# Patient Record
Sex: FEMALE | Race: WHITE | NOT HISPANIC OR LATINO | Employment: FULL TIME | URBAN - NONMETROPOLITAN AREA
[De-identification: names, ages, dates, MRNs, and addresses within clinical notes are randomized per-mention and may not be internally consistent; named-entity substitution may affect disease eponyms.]

---

## 2019-01-02 ENCOUNTER — HOSPITAL ENCOUNTER (EMERGENCY)
Facility: HOSPITAL | Age: 49
Discharge: HOME | End: 2019-01-02
Attending: EMERGENCY MEDICINE
Payer: COMMERCIAL

## 2019-01-02 VITALS
HEIGHT: 61 IN | DIASTOLIC BLOOD PRESSURE: 88 MMHG | RESPIRATION RATE: 20 BRPM | WEIGHT: 160.94 LBS | OXYGEN SATURATION: 95 % | TEMPERATURE: 102.2 F | SYSTOLIC BLOOD PRESSURE: 157 MMHG | HEART RATE: 115 BPM | BODY MASS INDEX: 30.39 KG/M2

## 2019-01-02 DIAGNOSIS — B34.9 VIRAL ILLNESS: Primary | ICD-10-CM

## 2019-01-02 LAB
FLUAV AG SPEC QL: DETECTED
FLUBV AG SPEC QL: ABNORMAL
RSV B RNA SPEC QL NAA+PROBE: ABNORMAL

## 2019-01-02 PROCEDURE — 87631 RESP VIRUS 3-5 TARGETS: CPT | Performed by: PHYSICIAN ASSISTANT

## 2019-01-02 PROCEDURE — 99283 EMERGENCY DEPT VISIT LOW MDM: CPT

## 2019-01-02 RX ORDER — OSELTAMIVIR PHOSPHATE 75 MG/1
75 CAPSULE ORAL 2 TIMES DAILY
Qty: 10 CAPSULE | Refills: 0 | Status: SHIPPED | OUTPATIENT
Start: 2019-01-02 | End: 2019-01-07

## 2019-01-02 RX ORDER — ONDANSETRON 4 MG/1
4 TABLET, ORALLY DISINTEGRATING ORAL ONCE
Status: COMPLETED | OUTPATIENT
Start: 2019-01-02 | End: 2019-01-02

## 2019-01-02 RX ORDER — IBUPROFEN 600 MG/1
600 TABLET ORAL ONCE
Status: COMPLETED | OUTPATIENT
Start: 2019-01-02 | End: 2019-01-02

## 2019-01-02 RX ORDER — ONDANSETRON 4 MG/1
4 TABLET, ORALLY DISINTEGRATING ORAL EVERY 6 HOURS PRN
Qty: 15 TABLET | Refills: 0 | Status: SHIPPED | OUTPATIENT
Start: 2019-01-02 | End: 2019-05-10 | Stop reason: ALTCHOICE

## 2019-01-02 RX ADMIN — ONDANSETRON 4 MG: 4 TABLET, ORALLY DISINTEGRATING ORAL at 10:31

## 2019-01-02 RX ADMIN — IBUPROFEN 600 MG: 600 TABLET, FILM COATED ORAL at 10:31

## 2019-01-02 NOTE — DISCHARGE INSTRUCTIONS
Influenza   WHAT YOU NEED TO KNOW:   What is influenza? Influenza (the flu) is an infection caused by the influenza virus  The flu is easily spread when an infected person coughs, sneezes, or has close contact with others  You may be able to spread the flu to others for 1 week or longer after signs or symptoms appear  What increases my risk for the flu? · Living with or caring for someone who has the flu    · Living in a nursing home or long-term care facility    · Living in close quarters with others    · A medical condition such as diabetes, cancer, heart disease, or lung disease    · Pregnancy    · Age older than 50 years    · A weak immune system caused by HIV, AIDS, an organ transplant, or another condition    · Traveling to places where other people have the flu  What are the signs and symptoms of the flu? · Fever and chills    · Headaches, body aches, and muscle or joint pain    · Cough, runny nose, and sore throat    · Loss of appetite, nausea, vomiting, or diarrhea    · Tiredness    · Trouble breathing  How is the flu diagnosed? Your healthcare provider will examine you and ask if you have other health conditions  Tell him if you have been around sick people or traveled recently  Tell your healthcare provider if you are pregnant  A sample of fluid may be collected from your nose or throat to be tested for the flu virus  How is the flu treated? Most people get better within a week  You may need any of the following:  · Acetaminophen  decreases pain and fever  It is available without a doctor's order  Ask how much to take and how often to take it  Follow directions  Acetaminophen can cause liver damage if not taken correctly  · NSAIDs , such as ibuprofen, help decrease swelling, pain, and fever  This medicine is available with or without a doctor's order  NSAIDs can cause stomach bleeding or kidney problems in certain people   If you take blood thinner medicine, always ask your healthcare provider if NSAIDs are safe for you  Always read the medicine label and follow directions  · Antivirals  help fight a viral infection  How can I manage my symptoms? · Rest  as much as you can to help you recover  · Drink liquids as directed  to help prevent dehydration  Ask how much liquid to drink each day and which liquids are best for you  How can I help prevent the spread of the flu? · Wash your hands often  Use soap and water  Wash your hands after you use the bathroom, change a child's diapers, or sneeze  Wash your hands before you prepare or eat food  Use gel hand cleanser when soap and water are not available  Do not touch your eyes, nose, or mouth unless you have washed your hands first            · Cover your mouth when you sneeze or cough  Cough into a tissue or the bend of your arm  · Clean shared items with a germ-killing   Clean table surfaces, doorknobs, and light switches  Do not share towels, silverware, and dishes with people who are sick  Wash bed sheets, towels, silverware, and dishes with soap and water  · Wear a mask  over your mouth and nose if you are sick or are near anyone who is sick  · Stay away from others  if you are sick  · Influenza vaccine  helps prevent influenza (flu)  Everyone older than 6 months should get a yearly influenza vaccine  Get the vaccine as soon as it is available, usually in September or October each year  Call 911 for any of the following:   · You have trouble breathing, and your lips look purple or blue  · You have a seizure  · You have new pain or pressure in your chest   When should I seek immediate care? · You are dizzy, or you are urinating less or not at all  · You have a headache with a stiff neck, and you feel tired or confused  · Your symptoms, such as shortness of breath, vomiting, or diarrhea, get worse  · Your symptoms, such as fever and coughing, seem to get better, but then get worse    When should I contact my healthcare provider? · You have new muscle pain or weakness  · You have questions or concerns about your condition or care  CARE AGREEMENT:   You have the right to help plan your care  Learn about your health condition and how it may be treated  Discuss treatment options with your caregivers to decide what care you want to receive  You always have the right to refuse treatment  The above information is an  only  It is not intended as medical advice for individual conditions or treatments  Talk to your doctor, nurse or pharmacist before following any medical regimen to see if it is safe and effective for you  © 2017 [employer]  Information is for End User's use only and may not be sold, redistributed or otherwise used for commercial purposes  All illustrations and images included in CareNotes® are the copyrighted property of [employer]  or Lupillo Martinez  Influenza   WHAT YOU NEED TO KNOW:   Influenza (the flu) is an infection caused by the influenza virus  The flu is easily spread when an infected person coughs, sneezes, or has close contact with others  You may be able to spread the flu to others for 1 week or longer after signs or symptoms appear  DISCHARGE INSTRUCTIONS:   Call 911 for any of the following:   · You have trouble breathing, and your lips look purple or blue  · You have a seizure  Seek care immediately if:   · You are dizzy, or you are urinating less or not at all  · You have a headache with a stiff neck, and you feel tired or confused  · You have new pain or pressure in your chest     · Your symptoms, such as shortness of breath, vomiting, or diarrhea, get worse  · Your symptoms, such as fever and coughing, seem to get better, but then get worse  Contact your healthcare provider if:   · You have new muscle pain or weakness  · You have questions or concerns about your condition or care  Medicines:   You may need any of the following:  · Acetaminophen  decreases pain and fever  It is available without a doctor's order  Ask how much to take and how often to take it  Follow directions  Acetaminophen can cause liver damage if not taken correctly  · NSAIDs , such as ibuprofen, help decrease swelling, pain, and fever  This medicine is available with or without a doctor's order  NSAIDs can cause stomach bleeding or kidney problems in certain people  If you take blood thinner medicine, always ask your healthcare provider if NSAIDs are safe for you  Always read the medicine label and follow directions  · Antivirals  help fight a viral infection  · Take your medicine as directed  Contact your healthcare provider if you think your medicine is not helping or if you have side effects  Tell him or her if you are allergic to any medicine  Keep a list of the medicines, vitamins, and herbs you take  Include the amounts, and when and why you take them  Bring the list or the pill bottles to follow-up visits  Carry your medicine list with you in case of an emergency  Rest  as much as you can to help you recover  Drink liquids as directed  to help prevent dehydration  Ask how much liquid to drink each day and which liquids are best for you  Prevent the spread of influenza:   · Wash your hands often  Use soap and water  Wash your hands after you use the bathroom, change a child's diapers, or sneeze  Wash your hands before you prepare or eat food  Use gel hand cleanser when soap and water are not available  Do not touch your eyes, nose, or mouth unless you have washed your hands first            · Cover your mouth when you sneeze or cough  Cough into a tissue or the bend of your arm  · Clean shared items with a germ-killing   Clean table surfaces, doorknobs, and light switches  Do not share towels, silverware, and dishes with people who are sick   Wash bed sheets, towels, silverware, and dishes with soap and water      · Wear a mask  over your mouth and nose if you are sick or are near anyone who is sick  · Stay away from others  if you are sick  · Influenza vaccine  helps prevent influenza (flu)  Everyone older than 6 months should get a yearly influenza vaccine  Get the vaccine as soon as it is available, usually in September or October each year  Follow up with your healthcare provider as directed:  Write down your questions so you remember to ask them during your visits  © 2017 [facility] Information is for End User's use only and may not be sold, redistributed or otherwise used for commercial purposes  All illustrations and images included in CareNotes® are the copyrighted property of [employer]  or Lupillo Henriquez  The above information is an  only  It is not intended as medical advice for individual conditions or treatments  Talk to your doctor, nurse or pharmacist before following any medical regimen to see if it is safe and effective for you

## 2019-01-02 NOTE — ED PROVIDER NOTES
History  Chief Complaint   Patient presents with    URI     NASAL AND CHEST CONGESTION STARTED 2 5 WEEKS  FEVER STARTED YESTERDAY   WITH BODY ACHES  Patient presents to the emergency department today for evaluation of upper respiratory symptoms  Of she presents via private vehicle with her  ambulatory  Her primary concern is intense body aches as well as generalize headache and fevers  She also complains of nasal congestion and cough  She actually initially commence with nasal congestion and cough 2 weeks ago however was improving  The fevers body aches just started last night  She called primary care who started azithromycin  She also took 5 mg of prednisone this morning  She did have a family member who had similar symptoms a few days ago  There is no specific chest pain or shortness of breath  She does have a history of asthma however she denies wheezing however has been utilizing her inhalers  None       Past Medical History:   Diagnosis Date    Asthma        Past Surgical History:   Procedure Laterality Date    TONSILLECTOMY         History reviewed  No pertinent family history  I have reviewed and agree with the history as documented  Social History   Substance Use Topics    Smoking status: Never Smoker    Smokeless tobacco: Never Used    Alcohol use No        Review of Systems   Constitutional: Positive for activity change, appetite change, chills, fatigue and fever  Negative for diaphoresis and unexpected weight change  HENT: Positive for congestion, postnasal drip, rhinorrhea and sore throat  Negative for dental problem, drooling, ear discharge, ear pain, facial swelling, hearing loss, mouth sores, nosebleeds, sinus pain, sinus pressure, sneezing, tinnitus, trouble swallowing and voice change  Eyes: Negative  Respiratory: Positive for cough  Negative for apnea, choking, chest tightness, shortness of breath, wheezing and stridor  Cardiovascular: Negative  Gastrointestinal: Negative  Endocrine: Negative  Genitourinary: Negative  Musculoskeletal: Positive for myalgias  Skin: Negative  Allergic/Immunologic: Negative  Neurological: Positive for headaches  Negative for dizziness, tremors, seizures, syncope, facial asymmetry, speech difficulty, weakness, light-headedness and numbness  Hematological: Negative  Psychiatric/Behavioral: Negative  All other systems reviewed and are negative  Physical Exam  Physical Exam   Constitutional: She is oriented to person, place, and time  She appears well-developed and well-nourished  HENT:   Head: Normocephalic  Right Ear: External ear normal    Left Ear: External ear normal    Nose: Nose normal    Mouth/Throat: Oropharynx is clear and moist  No oropharyngeal exudate  Clear nasal discharge   Eyes: Pupils are equal, round, and reactive to light  Conjunctivae and EOM are normal    Neck: Normal range of motion  No JVD present  No tracheal deviation present  No thyromegaly present  Cardiovascular: Regular rhythm, normal heart sounds and intact distal pulses  Exam reveals no gallop and no friction rub  No murmur heard  Regular tachycardic rhythm at 115 beats per minute   Pulmonary/Chest: Effort normal and breath sounds normal  No stridor  No respiratory distress  She has no wheezes  She has no rales  She exhibits no tenderness  Abdominal: Soft  She exhibits no distension and no mass  There is no tenderness  There is no rebound and no guarding  No hernia  Musculoskeletal: Normal range of motion  Lymphadenopathy:     She has no cervical adenopathy  Neurological: She is alert and oriented to person, place, and time  Skin: Skin is warm  Capillary refill takes less than 2 seconds  She is not diaphoretic  Psychiatric: She has a normal mood and affect  Vitals reviewed        Vital Signs  ED Triage Vitals [01/02/19 1001]   Temperature Pulse Respirations Blood Pressure SpO2   (!) 102 2 °F (39 °C) (!) 115 20 157/88 95 %      Temp Source Heart Rate Source Patient Position - Orthostatic VS BP Location FiO2 (%)   Temporal Monitor Sitting Left arm --      Pain Score       Worst Possible Pain           Vitals:    01/02/19 1001   BP: 157/88   Pulse: (!) 115   Patient Position - Orthostatic VS: Sitting       Visual Acuity      ED Medications  Medications   ibuprofen (MOTRIN) tablet 600 mg (not administered)   ondansetron (ZOFRAN-ODT) dispersible tablet 4 mg (not administered)       Diagnostic Studies  Results Reviewed     Procedure Component Value Units Date/Time    Influenza A/B and RSV by PCR [[id number]]     Lab Status:  No result Specimen:  Nasopharyngeal from Nasopharyngeal Swab                  No orders to display              Procedures  Procedures       Phone Contacts  ED Phone Contact    ED Course  ED Course as of Jan 02 1025   Wed Jan 02, 2019   1004 SpO2: 95 %   1004 Respirations: 20   1004 Pulse: (!) 115   1004 Temperature: (!) 102 2 °F (39 °C)   1004 Blood Pressure: 157/88                               MDM  CritCare Time    Disposition  Final diagnoses:   Viral illness     Time reflects when diagnosis was documented in both MDM as applicable and the Disposition within this note     Time User Action Codes Description Comment    1/2/2019 10:20 AM Myesha OHARA Add [B34 9] Viral illness       ED Disposition     ED Disposition Condition Comment    Discharge  Luwanna Najjar Rutch discharge to home/self care      Condition at discharge: Good        Follow-up Information     Follow up With Specialties Details Why Contact Info Additional Information    Estee Saldivar DO Family Medicine Schedule an appointment as soon as possible for a visit  [address] [place] [address]  [address]  [address]  [phone]       [facility] Emergency Department Emergency Medicine Go to If symptoms worsen [address] [place] ED, [address], [address]          Patient's Medications   Discharge Prescriptions    ONDANSETRON (ZOFRAN-ODT) 4 MG DISINTEGRATING TABLET    Take 1 tablet (4 mg total) by mouth every 6 (six) hours as needed for nausea or vomiting       Start Date: 1/2/2019  End Date: --       Order Dose: 4 mg       Quantity: 15 tablet    Refills: 0    OSELTAMIVIR (TAMIFLU) 75 MG CAPSULE    Take 1 capsule (75 mg total) by mouth 2 (two) times a day for 5 days       Start Date: 1/2/2019  End Date: 1/7/2019       Order Dose: 75 mg       Quantity: 10 capsule    Refills: 0     No discharge procedures on file      ED Provider  Electronically Signed by           Jake Bhagat PA-C  01/02/19 0715

## 2019-01-03 ENCOUNTER — TELEPHONE (OUTPATIENT)
Dept: EMERGENCY DEPT | Facility: HOSPITAL | Age: 49
End: 2019-01-03

## 2019-01-03 NOTE — PROGRESS NOTES
01/03/19 9:18 AM LMOM for pt to continue tamiflu, tested positive for influenza A   Call back if any questions

## 2019-02-07 ENCOUNTER — TRANSCRIBE ORDERS (OUTPATIENT)
Dept: ADMINISTRATIVE | Facility: HOSPITAL | Age: 49
End: 2019-02-07

## 2019-02-07 ENCOUNTER — HOSPITAL ENCOUNTER (OUTPATIENT)
Dept: RADIOLOGY | Facility: HOSPITAL | Age: 49
Discharge: HOME | End: 2019-02-07
Payer: COMMERCIAL

## 2019-02-07 DIAGNOSIS — J45.909 ASTHMATIC BRONCHITIS WITHOUT COMPLICATION, UNSPECIFIED ASTHMA SEVERITY, UNSPECIFIED WHETHER PERSISTENT: Primary | ICD-10-CM

## 2019-02-07 DIAGNOSIS — J45.909 ASTHMATIC BRONCHITIS WITHOUT COMPLICATION, UNSPECIFIED ASTHMA SEVERITY, UNSPECIFIED WHETHER PERSISTENT: ICD-10-CM

## 2019-02-07 PROCEDURE — 71046 X-RAY EXAM CHEST 2 VIEWS: CPT

## 2019-05-08 ENCOUNTER — TRANSCRIBE ORDERS (OUTPATIENT)
Dept: ADMINISTRATIVE | Facility: HOSPITAL | Age: 49
End: 2019-05-08

## 2019-05-08 DIAGNOSIS — N83.8 OVARIAN MASS: Primary | ICD-10-CM

## 2019-05-10 ENCOUNTER — APPOINTMENT (OUTPATIENT)
Dept: LAB | Facility: HOSPITAL | Age: 49
End: 2019-05-10
Attending: OBSTETRICS & GYNECOLOGY
Payer: COMMERCIAL

## 2019-05-10 ENCOUNTER — HOSPITAL ENCOUNTER (OUTPATIENT)
Dept: ULTRASOUND IMAGING | Facility: HOSPITAL | Age: 49
Discharge: HOME | End: 2019-05-10
Payer: COMMERCIAL

## 2019-05-10 ENCOUNTER — HOSPITAL ENCOUNTER (OUTPATIENT)
Dept: RADIOLOGY | Facility: HOSPITAL | Age: 49
Discharge: HOME | End: 2019-05-10
Attending: OBSTETRICS & GYNECOLOGY
Payer: COMMERCIAL

## 2019-05-10 ENCOUNTER — OFFICE VISIT (OUTPATIENT)
Dept: GYNECOLOGIC ONCOLOGY | Facility: CLINIC | Age: 49
End: 2019-05-10
Payer: COMMERCIAL

## 2019-05-10 VITALS
RESPIRATION RATE: 16 BRPM | BODY MASS INDEX: 28.32 KG/M2 | HEIGHT: 61 IN | DIASTOLIC BLOOD PRESSURE: 80 MMHG | HEART RATE: 84 BPM | WEIGHT: 150 LBS | SYSTOLIC BLOOD PRESSURE: 152 MMHG

## 2019-05-10 DIAGNOSIS — N83.8 OVARIAN MASS: ICD-10-CM

## 2019-05-10 DIAGNOSIS — N83.8 OVARIAN MASS: Primary | ICD-10-CM

## 2019-05-10 LAB
ALBUMIN SERPL BCP-MCNC: 4.2 G/DL (ref 3.5–5)
ALP SERPL-CCNC: 64 U/L (ref 46–116)
ALT SERPL W P-5'-P-CCNC: 23 U/L (ref 12–78)
ANION GAP SERPL CALCULATED.3IONS-SCNC: 7 MMOL/L (ref 4–13)
AST SERPL W P-5'-P-CCNC: 15 U/L (ref 5–45)
ATRIAL RATE: 88 BPM
BASOPHILS # BLD AUTO: 0.08 THOUSANDS/ΜL (ref 0–0.1)
BASOPHILS NFR BLD AUTO: 1 % (ref 0–1)
BILIRUB SERPL-MCNC: 0.3 MG/DL (ref 0.2–1)
BUN SERPL-MCNC: 16 MG/DL (ref 5–25)
CALCIUM SERPL-MCNC: 9.4 MG/DL (ref 8.3–10.1)
CANCER AG125 SERPL-ACNC: 45.4 U/ML (ref 0–30)
CHLORIDE SERPL-SCNC: 105 MMOL/L (ref 100–108)
CO2 SERPL-SCNC: 26 MMOL/L (ref 21–32)
CREAT SERPL-MCNC: 0.84 MG/DL (ref 0.6–1.3)
EOSINOPHIL # BLD AUTO: 0.12 THOUSAND/ΜL (ref 0–0.61)
EOSINOPHIL NFR BLD AUTO: 1 % (ref 0–6)
ERYTHROCYTE [DISTWIDTH] IN BLOOD BY AUTOMATED COUNT: 11.9 % (ref 11.6–15.1)
EST. AVERAGE GLUCOSE BLD GHB EST-MCNC: 137 MG/DL
GFR SERPL CREATININE-BSD FRML MDRD: 81 ML/MIN/1.73SQ M
GLUCOSE P FAST SERPL-MCNC: 97 MG/DL (ref 65–99)
HBA1C MFR BLD: 6.4 % (ref 4.2–6.3)
HCT VFR BLD AUTO: 41.7 % (ref 34.8–46.1)
HGB BLD-MCNC: 14 G/DL (ref 11.5–15.4)
IMM GRANULOCYTES # BLD AUTO: 0.01 THOUSAND/UL (ref 0–0.2)
IMM GRANULOCYTES NFR BLD AUTO: 0 % (ref 0–2)
INR PPP: 1.02 (ref 0.86–1.17)
LYMPHOCYTES # BLD AUTO: 1.72 THOUSANDS/ΜL (ref 0.6–4.47)
LYMPHOCYTES NFR BLD AUTO: 19 % (ref 14–44)
MCH RBC QN AUTO: 30.1 PG (ref 26.8–34.3)
MCHC RBC AUTO-ENTMCNC: 33.6 G/DL (ref 31.4–37.4)
MCV RBC AUTO: 90 FL (ref 82–98)
MONOCYTES # BLD AUTO: 0.58 THOUSAND/ΜL (ref 0.17–1.22)
MONOCYTES NFR BLD AUTO: 6 % (ref 4–12)
NEUTROPHILS # BLD AUTO: 6.73 THOUSANDS/ΜL (ref 1.85–7.62)
NEUTS SEG NFR BLD AUTO: 73 % (ref 43–75)
NRBC BLD AUTO-RTO: 0 /100 WBCS
P AXIS: 35 DEGREES
PLATELET # BLD AUTO: 334 THOUSANDS/UL (ref 149–390)
PMV BLD AUTO: 10.7 FL (ref 8.9–12.7)
POTASSIUM SERPL-SCNC: 3.8 MMOL/L (ref 3.5–5.3)
PR INTERVAL: 112 MS
PROT SERPL-MCNC: 8 G/DL (ref 6.4–8.2)
PROTHROMBIN TIME: 13.5 SECONDS (ref 11.8–14.2)
QRS AXIS: 47 DEGREES
QRSD INTERVAL: 74 MS
QT INTERVAL: 370 MS
QTC INTERVAL: 447 MS
RBC # BLD AUTO: 4.65 MILLION/UL (ref 3.81–5.12)
SODIUM SERPL-SCNC: 138 MMOL/L (ref 136–145)
T WAVE AXIS: -25 DEGREES
VENTRICULAR RATE: 88 BPM
WBC # BLD AUTO: 9.24 THOUSAND/UL (ref 4.31–10.16)

## 2019-05-10 PROCEDURE — 36415 COLL VENOUS BLD VENIPUNCTURE: CPT

## 2019-05-10 PROCEDURE — 76830 TRANSVAGINAL US NON-OB: CPT

## 2019-05-10 PROCEDURE — 76700 US EXAM ABDOM COMPLETE: CPT

## 2019-05-10 PROCEDURE — 71046 X-RAY EXAM CHEST 2 VIEWS: CPT

## 2019-05-10 PROCEDURE — 99245 OFF/OP CONSLTJ NEW/EST HI 55: CPT | Performed by: OBSTETRICS & GYNECOLOGY

## 2019-05-10 PROCEDURE — 93005 ELECTROCARDIOGRAM TRACING: CPT

## 2019-05-10 PROCEDURE — 80053 COMPREHEN METABOLIC PANEL: CPT

## 2019-05-10 PROCEDURE — 93010 ELECTROCARDIOGRAM REPORT: CPT | Performed by: INTERNAL MEDICINE

## 2019-05-10 PROCEDURE — 85025 COMPLETE CBC W/AUTO DIFF WBC: CPT

## 2019-05-10 PROCEDURE — 76856 US EXAM PELVIC COMPLETE: CPT

## 2019-05-10 PROCEDURE — 86304 IMMUNOASSAY TUMOR CA 125: CPT

## 2019-05-10 PROCEDURE — 85610 PROTHROMBIN TIME: CPT

## 2019-05-10 PROCEDURE — 83036 HEMOGLOBIN GLYCOSYLATED A1C: CPT

## 2019-05-10 RX ORDER — ALBUTEROL SULFATE 2.5 MG/3ML
SOLUTION RESPIRATORY (INHALATION)
Refills: 0 | COMMUNITY
Start: 2019-02-07 | End: 2020-08-17 | Stop reason: ALTCHOICE

## 2019-05-10 RX ORDER — HEPARIN SODIUM 5000 [USP'U]/ML
5000 INJECTION, SOLUTION INTRAVENOUS; SUBCUTANEOUS
Status: CANCELLED | OUTPATIENT
Start: 2019-05-11 | End: 2019-05-12

## 2019-05-10 RX ORDER — CLINDAMYCIN PHOSPHATE 900 MG/50ML
900 INJECTION INTRAVENOUS ONCE
Status: CANCELLED | OUTPATIENT
Start: 2019-05-16 | End: 2019-05-10

## 2019-05-10 RX ORDER — ACETAMINOPHEN 325 MG/1
975 TABLET ORAL ONCE
Status: CANCELLED | OUTPATIENT
Start: 2019-05-10 | End: 2019-05-10

## 2019-05-10 RX ORDER — SODIUM CHLORIDE, SODIUM LACTATE, POTASSIUM CHLORIDE, CALCIUM CHLORIDE 600; 310; 30; 20 MG/100ML; MG/100ML; MG/100ML; MG/100ML
125 INJECTION, SOLUTION INTRAVENOUS CONTINUOUS
Status: CANCELLED | OUTPATIENT
Start: 2019-05-10

## 2019-05-14 ENCOUNTER — HOSPITAL ENCOUNTER (EMERGENCY)
Facility: HOSPITAL | Age: 49
Discharge: HOME | End: 2019-05-15
Attending: EMERGENCY MEDICINE | Admitting: EMERGENCY MEDICINE
Payer: COMMERCIAL

## 2019-05-14 ENCOUNTER — HOSPITAL ENCOUNTER (OUTPATIENT)
Dept: CT IMAGING | Facility: HOSPITAL | Age: 49
Discharge: HOME | End: 2019-05-14
Payer: COMMERCIAL

## 2019-05-14 DIAGNOSIS — N83.8 OVARIAN MASS: ICD-10-CM

## 2019-05-14 DIAGNOSIS — R10.9 ABDOMINAL PAIN: Primary | ICD-10-CM

## 2019-05-14 PROCEDURE — 99285 EMERGENCY DEPT VISIT HI MDM: CPT | Performed by: EMERGENCY MEDICINE

## 2019-05-14 PROCEDURE — 74177 CT ABD & PELVIS W/CONTRAST: CPT

## 2019-05-14 PROCEDURE — 99285 EMERGENCY DEPT VISIT HI MDM: CPT

## 2019-05-14 PROCEDURE — 81025 URINE PREGNANCY TEST: CPT | Performed by: EMERGENCY MEDICINE

## 2019-05-14 RX ORDER — MORPHINE SULFATE 4 MG/ML
4 INJECTION, SOLUTION INTRAMUSCULAR; INTRAVENOUS ONCE
Status: COMPLETED | OUTPATIENT
Start: 2019-05-15 | End: 2019-05-15

## 2019-05-14 RX ADMIN — IOHEXOL 100 ML: 350 INJECTION, SOLUTION INTRAVENOUS at 07:51

## 2019-05-15 ENCOUNTER — APPOINTMENT (EMERGENCY)
Dept: ULTRASOUND IMAGING | Facility: HOSPITAL | Age: 49
End: 2019-05-15
Payer: COMMERCIAL

## 2019-05-15 VITALS
SYSTOLIC BLOOD PRESSURE: 169 MMHG | HEIGHT: 61 IN | WEIGHT: 150.57 LBS | BODY MASS INDEX: 28.43 KG/M2 | TEMPERATURE: 98.6 F | HEART RATE: 78 BPM | DIASTOLIC BLOOD PRESSURE: 107 MMHG | RESPIRATION RATE: 16 BRPM | OXYGEN SATURATION: 94 %

## 2019-05-15 LAB
ALBUMIN SERPL BCP-MCNC: 3.6 G/DL (ref 3.5–5)
ALP SERPL-CCNC: 58 U/L (ref 46–116)
ALT SERPL W P-5'-P-CCNC: 26 U/L (ref 12–78)
ANION GAP SERPL CALCULATED.3IONS-SCNC: 6 MMOL/L (ref 4–13)
AST SERPL W P-5'-P-CCNC: 14 U/L (ref 5–45)
ATRIAL RATE: 73 BPM
BASOPHILS # BLD AUTO: 0.06 THOUSANDS/ΜL (ref 0–0.1)
BASOPHILS NFR BLD AUTO: 1 % (ref 0–1)
BILIRUB SERPL-MCNC: 0.2 MG/DL (ref 0.2–1)
BILIRUB UR QL STRIP: NEGATIVE
BUN SERPL-MCNC: 13 MG/DL (ref 5–25)
CALCIUM SERPL-MCNC: 9.3 MG/DL (ref 8.3–10.1)
CHLORIDE SERPL-SCNC: 105 MMOL/L (ref 100–108)
CLARITY UR: CLEAR
CO2 SERPL-SCNC: 28 MMOL/L (ref 21–32)
COLOR UR: NORMAL
CREAT SERPL-MCNC: 0.83 MG/DL (ref 0.6–1.3)
EOSINOPHIL # BLD AUTO: 0.66 THOUSAND/ΜL (ref 0–0.61)
EOSINOPHIL NFR BLD AUTO: 8 % (ref 0–6)
ERYTHROCYTE [DISTWIDTH] IN BLOOD BY AUTOMATED COUNT: 11.8 % (ref 11.6–15.1)
EXT PREG TEST URINE: NORMAL
GFR SERPL CREATININE-BSD FRML MDRD: 82 ML/MIN/1.73SQ M
GLUCOSE SERPL-MCNC: 111 MG/DL (ref 65–140)
GLUCOSE UR STRIP-MCNC: NEGATIVE MG/DL
HCT VFR BLD AUTO: 40.2 % (ref 34.8–46.1)
HGB BLD-MCNC: 13.8 G/DL (ref 11.5–15.4)
HGB UR QL STRIP.AUTO: NEGATIVE
IMM GRANULOCYTES # BLD AUTO: 0.02 THOUSAND/UL (ref 0–0.2)
IMM GRANULOCYTES NFR BLD AUTO: 0 % (ref 0–2)
KETONES UR STRIP-MCNC: NEGATIVE MG/DL
LEUKOCYTE ESTERASE UR QL STRIP: NEGATIVE
LIPASE SERPL-CCNC: 251 U/L (ref 73–393)
LYMPHOCYTES # BLD AUTO: 2.5 THOUSANDS/ΜL (ref 0.6–4.47)
LYMPHOCYTES NFR BLD AUTO: 30 % (ref 14–44)
MCH RBC QN AUTO: 31.2 PG (ref 26.8–34.3)
MCHC RBC AUTO-ENTMCNC: 34.3 G/DL (ref 31.4–37.4)
MCV RBC AUTO: 91 FL (ref 82–98)
MONOCYTES # BLD AUTO: 0.72 THOUSAND/ΜL (ref 0.17–1.22)
MONOCYTES NFR BLD AUTO: 9 % (ref 4–12)
NEUTROPHILS # BLD AUTO: 4.3 THOUSANDS/ΜL (ref 1.85–7.62)
NEUTS SEG NFR BLD AUTO: 52 % (ref 43–75)
NITRITE UR QL STRIP: NEGATIVE
NRBC BLD AUTO-RTO: 0 /100 WBCS
P AXIS: 32 DEGREES
PH UR STRIP.AUTO: 6 [PH]
PLATELET # BLD AUTO: 277 THOUSANDS/UL (ref 149–390)
PMV BLD AUTO: 10.2 FL (ref 8.9–12.7)
POTASSIUM SERPL-SCNC: 3.8 MMOL/L (ref 3.5–5.3)
PR INTERVAL: 128 MS
PROT SERPL-MCNC: 7.1 G/DL (ref 6.4–8.2)
PROT UR STRIP-MCNC: NEGATIVE MG/DL
QRS AXIS: 63 DEGREES
QRSD INTERVAL: 84 MS
QT INTERVAL: 396 MS
QTC INTERVAL: 436 MS
RBC # BLD AUTO: 4.43 MILLION/UL (ref 3.81–5.12)
SODIUM SERPL-SCNC: 139 MMOL/L (ref 136–145)
SP GR UR STRIP.AUTO: <=1.005 (ref 1–1.03)
T WAVE AXIS: 29 DEGREES
TROPONIN I SERPL-MCNC: <0.02 NG/ML
UROBILINOGEN UR QL STRIP.AUTO: 0.2 E.U./DL
VENTRICULAR RATE: 73 BPM
WBC # BLD AUTO: 8.26 THOUSAND/UL (ref 4.31–10.16)

## 2019-05-15 PROCEDURE — 80053 COMPREHEN METABOLIC PANEL: CPT | Performed by: EMERGENCY MEDICINE

## 2019-05-15 PROCEDURE — 83690 ASSAY OF LIPASE: CPT | Performed by: EMERGENCY MEDICINE

## 2019-05-15 PROCEDURE — 76856 US EXAM PELVIC COMPLETE: CPT

## 2019-05-15 PROCEDURE — 93010 ELECTROCARDIOGRAM REPORT: CPT | Performed by: INTERNAL MEDICINE

## 2019-05-15 PROCEDURE — 76830 TRANSVAGINAL US NON-OB: CPT

## 2019-05-15 PROCEDURE — 81003 URINALYSIS AUTO W/O SCOPE: CPT | Performed by: EMERGENCY MEDICINE

## 2019-05-15 PROCEDURE — 93005 ELECTROCARDIOGRAM TRACING: CPT

## 2019-05-15 PROCEDURE — 96374 THER/PROPH/DIAG INJ IV PUSH: CPT

## 2019-05-15 PROCEDURE — 36415 COLL VENOUS BLD VENIPUNCTURE: CPT | Performed by: EMERGENCY MEDICINE

## 2019-05-15 PROCEDURE — 84484 ASSAY OF TROPONIN QUANT: CPT | Performed by: EMERGENCY MEDICINE

## 2019-05-15 PROCEDURE — 96375 TX/PRO/DX INJ NEW DRUG ADDON: CPT

## 2019-05-15 PROCEDURE — 85025 COMPLETE CBC W/AUTO DIFF WBC: CPT | Performed by: EMERGENCY MEDICINE

## 2019-05-15 RX ORDER — MELATONIN
1000 DAILY
COMMUNITY
End: 2019-07-29 | Stop reason: ALTCHOICE

## 2019-05-15 RX ORDER — HYDROMORPHONE HCL/PF 1 MG/ML
1 SYRINGE (ML) INJECTION ONCE
Status: COMPLETED | OUTPATIENT
Start: 2019-05-15 | End: 2019-05-15

## 2019-05-15 RX ORDER — CHLORAL HYDRATE 500 MG
1000 CAPSULE ORAL DAILY
COMMUNITY
End: 2019-07-29 | Stop reason: ALTCHOICE

## 2019-05-15 RX ORDER — NICOTINE POLACRILEX 2 MG
1 GUM BUCCAL DAILY
COMMUNITY
End: 2019-07-29 | Stop reason: ALTCHOICE

## 2019-05-15 RX ORDER — OXYCODONE HYDROCHLORIDE AND ACETAMINOPHEN 5; 325 MG/1; MG/1
1-2 TABLET ORAL EVERY 6 HOURS PRN
Qty: 20 TABLET | Refills: 0 | Status: SHIPPED | OUTPATIENT
Start: 2019-05-15 | End: 2019-06-28

## 2019-05-15 RX ORDER — ONDANSETRON 2 MG/ML
INJECTION INTRAMUSCULAR; INTRAVENOUS
Status: COMPLETED
Start: 2019-05-15 | End: 2019-05-15

## 2019-05-15 RX ORDER — ONDANSETRON 2 MG/ML
4 INJECTION INTRAMUSCULAR; INTRAVENOUS ONCE
Status: COMPLETED | OUTPATIENT
Start: 2019-05-15 | End: 2019-05-15

## 2019-05-15 RX ADMIN — ONDANSETRON 4 MG: 2 INJECTION INTRAMUSCULAR; INTRAVENOUS at 02:42

## 2019-05-15 RX ADMIN — MORPHINE SULFATE 4 MG: 4 INJECTION INTRAVENOUS at 00:26

## 2019-05-15 RX ADMIN — BISACODYL 10 MG: 5 TABLET, COATED ORAL at 02:10

## 2019-05-15 RX ADMIN — HYDROMORPHONE HYDROCHLORIDE 1 MG: 1 INJECTION, SOLUTION INTRAMUSCULAR; INTRAVENOUS; SUBCUTANEOUS at 02:08

## 2019-05-16 ENCOUNTER — ANESTHESIA EVENT (OUTPATIENT)
Dept: PERIOP | Facility: HOSPITAL | Age: 49
DRG: 737 | End: 2019-05-16
Payer: COMMERCIAL

## 2019-05-16 ENCOUNTER — HOSPITAL ENCOUNTER (INPATIENT)
Facility: HOSPITAL | Age: 49
LOS: 4 days | Discharge: HOME | DRG: 737 | End: 2019-05-20
Attending: OBSTETRICS & GYNECOLOGY | Admitting: OBSTETRICS & GYNECOLOGY
Payer: COMMERCIAL

## 2019-05-16 ENCOUNTER — ANESTHESIA (OUTPATIENT)
Dept: PERIOP | Facility: HOSPITAL | Age: 49
DRG: 737 | End: 2019-05-16
Payer: COMMERCIAL

## 2019-05-16 DIAGNOSIS — N83.8 OVARIAN MASS: ICD-10-CM

## 2019-05-16 DIAGNOSIS — D68.69 HYPERCOAGULABLE STATE, SECONDARY (HCC): ICD-10-CM

## 2019-05-16 DIAGNOSIS — G89.18 POST-OPERATIVE PAIN: Primary | ICD-10-CM

## 2019-05-16 LAB
ABO GROUP BLD: NORMAL
BLD GP AB SCN SERPL QL: NEGATIVE
EXT PREGNANCY TEST URINE: NEGATIVE
GLUCOSE SERPL-MCNC: 145 MG/DL (ref 65–140)
RH BLD: POSITIVE
SPECIMEN EXPIRATION DATE: NORMAL

## 2019-05-16 PROCEDURE — 88307 TISSUE EXAM BY PATHOLOGIST: CPT | Performed by: PATHOLOGY

## 2019-05-16 PROCEDURE — 86900 BLOOD TYPING SEROLOGIC ABO: CPT | Performed by: OBSTETRICS & GYNECOLOGY

## 2019-05-16 PROCEDURE — 07BC0ZX EXCISION OF PELVIS LYMPHATIC, OPEN APPROACH, DIAGNOSTIC: ICD-10-PCS | Performed by: OBSTETRICS & GYNECOLOGY

## 2019-05-16 PROCEDURE — 86850 RBC ANTIBODY SCREEN: CPT | Performed by: OBSTETRICS & GYNECOLOGY

## 2019-05-16 PROCEDURE — 88305 TISSUE EXAM BY PATHOLOGIST: CPT | Performed by: PATHOLOGY

## 2019-05-16 PROCEDURE — 88341 IMHCHEM/IMCYTCHM EA ADD ANTB: CPT | Performed by: PATHOLOGY

## 2019-05-16 PROCEDURE — 58954 TAH RAD DEBULK/LYMPH REMOVE: CPT | Performed by: OBSTETRICS & GYNECOLOGY

## 2019-05-16 PROCEDURE — 0UT70ZZ RESECTION OF BILATERAL FALLOPIAN TUBES, OPEN APPROACH: ICD-10-PCS | Performed by: OBSTETRICS & GYNECOLOGY

## 2019-05-16 PROCEDURE — 82948 REAGENT STRIP/BLOOD GLUCOSE: CPT

## 2019-05-16 PROCEDURE — 88160 CYTOPATH SMEAR OTHER SOURCE: CPT | Performed by: PATHOLOGY

## 2019-05-16 PROCEDURE — 81025 URINE PREGNANCY TEST: CPT | Performed by: OBSTETRICS & GYNECOLOGY

## 2019-05-16 PROCEDURE — 86901 BLOOD TYPING SEROLOGIC RH(D): CPT | Performed by: OBSTETRICS & GYNECOLOGY

## 2019-05-16 PROCEDURE — 0UT20ZZ RESECTION OF BILATERAL OVARIES, OPEN APPROACH: ICD-10-PCS | Performed by: OBSTETRICS & GYNECOLOGY

## 2019-05-16 PROCEDURE — 88342 IMHCHEM/IMCYTCHM 1ST ANTB: CPT | Performed by: PATHOLOGY

## 2019-05-16 PROCEDURE — 88331 PATH CONSLTJ SURG 1 BLK 1SPC: CPT | Performed by: PATHOLOGY

## 2019-05-16 PROCEDURE — 0DBU0ZZ EXCISION OF OMENTUM, OPEN APPROACH: ICD-10-PCS | Performed by: OBSTETRICS & GYNECOLOGY

## 2019-05-16 PROCEDURE — 0UT90ZZ RESECTION OF UTERUS, OPEN APPROACH: ICD-10-PCS | Performed by: OBSTETRICS & GYNECOLOGY

## 2019-05-16 RX ORDER — OXYCODONE HYDROCHLORIDE 10 MG/1
10 TABLET ORAL EVERY 4 HOURS PRN
Status: CANCELLED | OUTPATIENT
Start: 2019-05-16

## 2019-05-16 RX ORDER — ROPIVACAINE HYDROCHLORIDE 2 MG/ML
INJECTION, SOLUTION EPIDURAL; INFILTRATION; PERINEURAL AS NEEDED
Status: DISCONTINUED | OUTPATIENT
Start: 2019-05-16 | End: 2019-05-16 | Stop reason: SURG

## 2019-05-16 RX ORDER — ROCURONIUM BROMIDE 10 MG/ML
INJECTION, SOLUTION INTRAVENOUS AS NEEDED
Status: DISCONTINUED | OUTPATIENT
Start: 2019-05-16 | End: 2019-05-16 | Stop reason: SURG

## 2019-05-16 RX ORDER — CLINDAMYCIN PHOSPHATE 900 MG/50ML
INJECTION INTRAVENOUS AS NEEDED
Status: DISCONTINUED | OUTPATIENT
Start: 2019-05-16 | End: 2019-05-16 | Stop reason: SURG

## 2019-05-16 RX ORDER — DEXTROSE, SODIUM CHLORIDE, AND POTASSIUM CHLORIDE 5; .45; .15 G/100ML; G/100ML; G/100ML
75 INJECTION INTRAVENOUS CONTINUOUS
Status: DISCONTINUED | OUTPATIENT
Start: 2019-05-16 | End: 2019-05-18

## 2019-05-16 RX ORDER — NEOSTIGMINE METHYLSULFATE 1 MG/ML
INJECTION INTRAVENOUS AS NEEDED
Status: DISCONTINUED | OUTPATIENT
Start: 2019-05-16 | End: 2019-05-16 | Stop reason: SURG

## 2019-05-16 RX ORDER — ALBUTEROL SULFATE 90 UG/1
2 AEROSOL, METERED RESPIRATORY (INHALATION) EVERY 4 HOURS PRN
Status: DISCONTINUED | OUTPATIENT
Start: 2019-05-16 | End: 2019-05-20 | Stop reason: HOSPADM

## 2019-05-16 RX ORDER — SODIUM CHLORIDE, SODIUM LACTATE, POTASSIUM CHLORIDE, CALCIUM CHLORIDE 600; 310; 30; 20 MG/100ML; MG/100ML; MG/100ML; MG/100ML
75 INJECTION, SOLUTION INTRAVENOUS CONTINUOUS
Status: DISCONTINUED | OUTPATIENT
Start: 2019-05-16 | End: 2019-05-18

## 2019-05-16 RX ORDER — ALBUTEROL SULFATE 2.5 MG/3ML
2.5 SOLUTION RESPIRATORY (INHALATION) EVERY 6 HOURS PRN
Status: DISCONTINUED | OUTPATIENT
Start: 2019-05-16 | End: 2019-05-16

## 2019-05-16 RX ORDER — ALBUMIN, HUMAN INJ 5% 5 %
12.5 SOLUTION INTRAVENOUS ONCE
Status: COMPLETED | OUTPATIENT
Start: 2019-05-16 | End: 2019-05-16

## 2019-05-16 RX ORDER — CLINDAMYCIN PHOSPHATE 900 MG/50ML
900 INJECTION INTRAVENOUS ONCE
Status: DISCONTINUED | OUTPATIENT
Start: 2019-05-16 | End: 2019-05-16 | Stop reason: HOSPADM

## 2019-05-16 RX ORDER — LIDOCAINE HYDROCHLORIDE 10 MG/ML
0.5 INJECTION, SOLUTION EPIDURAL; INFILTRATION; INTRACAUDAL; PERINEURAL ONCE AS NEEDED
Status: DISCONTINUED | OUTPATIENT
Start: 2019-05-16 | End: 2019-05-16 | Stop reason: HOSPADM

## 2019-05-16 RX ORDER — LIDOCAINE HYDROCHLORIDE AND EPINEPHRINE 15; 5 MG/ML; UG/ML
INJECTION, SOLUTION EPIDURAL
Status: COMPLETED | OUTPATIENT
Start: 2019-05-16 | End: 2019-05-16

## 2019-05-16 RX ORDER — LIDOCAINE HYDROCHLORIDE 10 MG/ML
INJECTION, SOLUTION INFILTRATION; PERINEURAL AS NEEDED
Status: DISCONTINUED | OUTPATIENT
Start: 2019-05-16 | End: 2019-05-16 | Stop reason: SURG

## 2019-05-16 RX ORDER — FENTANYL CITRATE 50 UG/ML
INJECTION, SOLUTION INTRAMUSCULAR; INTRAVENOUS AS NEEDED
Status: DISCONTINUED | OUTPATIENT
Start: 2019-05-16 | End: 2019-05-16 | Stop reason: SURG

## 2019-05-16 RX ORDER — IBUPROFEN 400 MG/1
600 TABLET ORAL EVERY 6 HOURS PRN
Status: CANCELLED | OUTPATIENT
Start: 2019-05-16

## 2019-05-16 RX ORDER — ONDANSETRON 4 MG/1
8 TABLET, ORALLY DISINTEGRATING ORAL EVERY 6 HOURS PRN
COMMUNITY
End: 2019-05-31 | Stop reason: SDUPTHER

## 2019-05-16 RX ORDER — ACETAMINOPHEN 325 MG/1
975 TABLET ORAL ONCE
Status: DISCONTINUED | OUTPATIENT
Start: 2019-05-16 | End: 2019-05-16 | Stop reason: HOSPADM

## 2019-05-16 RX ORDER — ONDANSETRON 2 MG/ML
4 INJECTION INTRAMUSCULAR; INTRAVENOUS EVERY 6 HOURS PRN
Status: DISCONTINUED | OUTPATIENT
Start: 2019-05-16 | End: 2019-05-20 | Stop reason: HOSPADM

## 2019-05-16 RX ORDER — PROPOFOL 10 MG/ML
INJECTION, EMULSION INTRAVENOUS AS NEEDED
Status: DISCONTINUED | OUTPATIENT
Start: 2019-05-16 | End: 2019-05-16 | Stop reason: SURG

## 2019-05-16 RX ORDER — EPHEDRINE SULFATE 50 MG/ML
INJECTION INTRAVENOUS AS NEEDED
Status: DISCONTINUED | OUTPATIENT
Start: 2019-05-16 | End: 2019-05-16 | Stop reason: SURG

## 2019-05-16 RX ORDER — METOCLOPRAMIDE HYDROCHLORIDE 5 MG/ML
INJECTION INTRAMUSCULAR; INTRAVENOUS AS NEEDED
Status: DISCONTINUED | OUTPATIENT
Start: 2019-05-16 | End: 2019-05-16 | Stop reason: SURG

## 2019-05-16 RX ORDER — MIDAZOLAM HYDROCHLORIDE 1 MG/ML
INJECTION INTRAMUSCULAR; INTRAVENOUS AS NEEDED
Status: DISCONTINUED | OUTPATIENT
Start: 2019-05-16 | End: 2019-05-16 | Stop reason: SURG

## 2019-05-16 RX ORDER — SODIUM CHLORIDE, SODIUM LACTATE, POTASSIUM CHLORIDE, CALCIUM CHLORIDE 600; 310; 30; 20 MG/100ML; MG/100ML; MG/100ML; MG/100ML
125 INJECTION, SOLUTION INTRAVENOUS CONTINUOUS
Status: DISCONTINUED | OUTPATIENT
Start: 2019-05-16 | End: 2019-05-16

## 2019-05-16 RX ORDER — MAGNESIUM HYDROXIDE 1200 MG/15ML
LIQUID ORAL AS NEEDED
Status: DISCONTINUED | OUTPATIENT
Start: 2019-05-16 | End: 2019-05-16 | Stop reason: HOSPADM

## 2019-05-16 RX ORDER — SODIUM CHLORIDE, SODIUM LACTATE, POTASSIUM CHLORIDE, CALCIUM CHLORIDE 600; 310; 30; 20 MG/100ML; MG/100ML; MG/100ML; MG/100ML
INJECTION, SOLUTION INTRAVENOUS CONTINUOUS PRN
Status: DISCONTINUED | OUTPATIENT
Start: 2019-05-16 | End: 2019-05-16

## 2019-05-16 RX ORDER — DEXAMETHASONE SODIUM PHOSPHATE 10 MG/ML
INJECTION, SOLUTION INTRAMUSCULAR; INTRAVENOUS AS NEEDED
Status: DISCONTINUED | OUTPATIENT
Start: 2019-05-16 | End: 2019-05-16 | Stop reason: SURG

## 2019-05-16 RX ORDER — ONDANSETRON 2 MG/ML
INJECTION INTRAMUSCULAR; INTRAVENOUS AS NEEDED
Status: DISCONTINUED | OUTPATIENT
Start: 2019-05-16 | End: 2019-05-16 | Stop reason: SURG

## 2019-05-16 RX ORDER — ACETAMINOPHEN 325 MG/1
650 TABLET ORAL EVERY 6 HOURS SCHEDULED
Status: CANCELLED | OUTPATIENT
Start: 2019-05-16

## 2019-05-16 RX ORDER — ONDANSETRON 2 MG/ML
4 INJECTION INTRAMUSCULAR; INTRAVENOUS ONCE AS NEEDED
Status: DISCONTINUED | OUTPATIENT
Start: 2019-05-16 | End: 2019-05-16 | Stop reason: HOSPADM

## 2019-05-16 RX ORDER — FENTANYL CITRATE/PF 50 MCG/ML
25 SYRINGE (ML) INJECTION
Status: DISCONTINUED | OUTPATIENT
Start: 2019-05-16 | End: 2019-05-16 | Stop reason: HOSPADM

## 2019-05-16 RX ORDER — SODIUM CHLORIDE 9 MG/ML
INJECTION, SOLUTION INTRAVENOUS CONTINUOUS PRN
Status: DISCONTINUED | OUTPATIENT
Start: 2019-05-16 | End: 2019-05-16 | Stop reason: SURG

## 2019-05-16 RX ORDER — HEPARIN SODIUM 5000 [USP'U]/ML
5000 INJECTION, SOLUTION INTRAVENOUS; SUBCUTANEOUS
Status: DISCONTINUED | OUTPATIENT
Start: 2019-05-16 | End: 2019-05-16 | Stop reason: HOSPADM

## 2019-05-16 RX ORDER — GLYCOPYRROLATE 0.2 MG/ML
INJECTION INTRAMUSCULAR; INTRAVENOUS AS NEEDED
Status: DISCONTINUED | OUTPATIENT
Start: 2019-05-16 | End: 2019-05-16 | Stop reason: SURG

## 2019-05-16 RX ORDER — OXYCODONE HYDROCHLORIDE 5 MG/1
5 TABLET ORAL EVERY 4 HOURS PRN
Status: CANCELLED | OUTPATIENT
Start: 2019-05-16

## 2019-05-16 RX ORDER — HEPARIN SODIUM 5000 [USP'U]/ML
5000 INJECTION, SOLUTION INTRAVENOUS; SUBCUTANEOUS EVERY 12 HOURS SCHEDULED
Status: COMPLETED | OUTPATIENT
Start: 2019-05-17 | End: 2019-05-17

## 2019-05-16 RX ADMIN — SODIUM CHLORIDE, SODIUM LACTATE, POTASSIUM CHLORIDE, AND CALCIUM CHLORIDE: .6; .31; .03; .02 INJECTION, SOLUTION INTRAVENOUS at 14:39

## 2019-05-16 RX ADMIN — PHENYLEPHRINE HYDROCHLORIDE 100 MCG: 10 INJECTION INTRAVENOUS at 14:06

## 2019-05-16 RX ADMIN — EPHEDRINE SULFATE 5 MG: 50 INJECTION, SOLUTION INTRAVENOUS at 13:31

## 2019-05-16 RX ADMIN — PHENYLEPHRINE HYDROCHLORIDE 100 MCG: 10 INJECTION INTRAVENOUS at 13:28

## 2019-05-16 RX ADMIN — PHENYLEPHRINE HYDROCHLORIDE 100 MCG: 10 INJECTION INTRAVENOUS at 17:08

## 2019-05-16 RX ADMIN — PHENYLEPHRINE HYDROCHLORIDE 100 MCG: 10 INJECTION INTRAVENOUS at 14:46

## 2019-05-16 RX ADMIN — GENTAMICIN SULFATE 70 MG: 40 INJECTION, SOLUTION INTRAMUSCULAR; INTRAVENOUS at 13:34

## 2019-05-16 RX ADMIN — PHENYLEPHRINE HYDROCHLORIDE 100 MCG: 10 INJECTION INTRAVENOUS at 14:55

## 2019-05-16 RX ADMIN — ROPIVACAINE HYDROCHLORIDE: 5 INJECTION, SOLUTION EPIDURAL; INFILTRATION; PERINEURAL at 17:30

## 2019-05-16 RX ADMIN — PHENYLEPHRINE HYDROCHLORIDE 100 MCG: 10 INJECTION INTRAVENOUS at 16:29

## 2019-05-16 RX ADMIN — NEOSTIGMINE METHYLSULFATE 3 MG: 1 INJECTION, SOLUTION INTRAVENOUS at 17:02

## 2019-05-16 RX ADMIN — ROCURONIUM BROMIDE 10 MG: 10 INJECTION, SOLUTION INTRAVENOUS at 15:10

## 2019-05-16 RX ADMIN — EPHEDRINE SULFATE 5 MG: 50 INJECTION, SOLUTION INTRAVENOUS at 17:30

## 2019-05-16 RX ADMIN — DEXAMETHASONE SODIUM PHOSPHATE 10 MG: 10 INJECTION, SOLUTION INTRAMUSCULAR; INTRAVENOUS at 13:31

## 2019-05-16 RX ADMIN — LIDOCAINE HYDROCHLORIDE AND EPINEPHRINE 5 ML: 15; 5 INJECTION, SOLUTION EPIDURAL at 12:56

## 2019-05-16 RX ADMIN — PROPOFOL 150 MG: 10 INJECTION, EMULSION INTRAVENOUS at 13:15

## 2019-05-16 RX ADMIN — PHENYLEPHRINE HYDROCHLORIDE 100 MCG: 10 INJECTION INTRAVENOUS at 13:17

## 2019-05-16 RX ADMIN — PHENYLEPHRINE HYDROCHLORIDE 100 MCG: 10 INJECTION INTRAVENOUS at 15:16

## 2019-05-16 RX ADMIN — MIDAZOLAM 2 MG: 1 INJECTION INTRAMUSCULAR; INTRAVENOUS at 13:07

## 2019-05-16 RX ADMIN — EPHEDRINE SULFATE 5 MG: 50 INJECTION, SOLUTION INTRAVENOUS at 16:43

## 2019-05-16 RX ADMIN — PHENYLEPHRINE HYDROCHLORIDE 100 MCG: 10 INJECTION INTRAVENOUS at 16:57

## 2019-05-16 RX ADMIN — PHENYLEPHRINE HYDROCHLORIDE 100 MCG: 10 INJECTION INTRAVENOUS at 15:04

## 2019-05-16 RX ADMIN — DEXTROSE, SODIUM CHLORIDE, AND POTASSIUM CHLORIDE 125 ML/HR: 5; .45; .15 INJECTION INTRAVENOUS at 18:25

## 2019-05-16 RX ADMIN — ONDANSETRON 4 MG: 2 INJECTION INTRAMUSCULAR; INTRAVENOUS at 13:37

## 2019-05-16 RX ADMIN — GLYCOPYRROLATE 0.6 MG: 0.2 INJECTION, SOLUTION INTRAMUSCULAR; INTRAVENOUS at 17:02

## 2019-05-16 RX ADMIN — SODIUM CHLORIDE, SODIUM LACTATE, POTASSIUM CHLORIDE, AND CALCIUM CHLORIDE: .6; .31; .03; .02 INJECTION, SOLUTION INTRAVENOUS at 16:52

## 2019-05-16 RX ADMIN — ROCURONIUM BROMIDE 10 MG: 10 INJECTION, SOLUTION INTRAVENOUS at 15:47

## 2019-05-16 RX ADMIN — PHENYLEPHRINE HYDROCHLORIDE 15 MCG/MIN: 10 INJECTION INTRAVENOUS at 15:27

## 2019-05-16 RX ADMIN — ROCURONIUM BROMIDE 40 MG: 10 INJECTION, SOLUTION INTRAVENOUS at 13:16

## 2019-05-16 RX ADMIN — METOCLOPRAMIDE 10 MG: 5 INJECTION, SOLUTION INTRAMUSCULAR; INTRAVENOUS at 13:44

## 2019-05-16 RX ADMIN — PHENYLEPHRINE HYDROCHLORIDE 100 MCG: 10 INJECTION INTRAVENOUS at 13:49

## 2019-05-16 RX ADMIN — LIDOCAINE HYDROCHLORIDE 50 MG: 10 INJECTION, SOLUTION INFILTRATION; PERINEURAL at 13:15

## 2019-05-16 RX ADMIN — FENTANYL CITRATE 200 MCG: 50 INJECTION, SOLUTION INTRAMUSCULAR; INTRAVENOUS at 14:22

## 2019-05-16 RX ADMIN — PHENYLEPHRINE HYDROCHLORIDE 100 MCG: 10 INJECTION INTRAVENOUS at 13:53

## 2019-05-16 RX ADMIN — ROCURONIUM BROMIDE 10 MG: 10 INJECTION, SOLUTION INTRAVENOUS at 13:56

## 2019-05-16 RX ADMIN — CLINDAMYCIN PHOSPHATE 900 MG: 900 INJECTION, SOLUTION INTRAVENOUS at 13:24

## 2019-05-16 RX ADMIN — FENTANYL CITRATE 50 MCG: 50 INJECTION, SOLUTION INTRAMUSCULAR; INTRAVENOUS at 13:15

## 2019-05-16 RX ADMIN — ALBUMIN (HUMAN) 12.5 G: 12.5 SOLUTION INTRAVENOUS at 17:45

## 2019-05-16 RX ADMIN — SODIUM CHLORIDE: 9 INJECTION, SOLUTION INTRAVENOUS at 13:18

## 2019-05-16 RX ADMIN — SODIUM CHLORIDE, SODIUM LACTATE, POTASSIUM CHLORIDE, AND CALCIUM CHLORIDE 125 ML/HR: .6; .31; .03; .02 INJECTION, SOLUTION INTRAVENOUS at 12:00

## 2019-05-16 RX ADMIN — ROCURONIUM BROMIDE 10 MG: 10 INJECTION, SOLUTION INTRAVENOUS at 14:54

## 2019-05-16 RX ADMIN — PHENYLEPHRINE HYDROCHLORIDE 100 MCG: 10 INJECTION INTRAVENOUS at 15:25

## 2019-05-16 RX ADMIN — ROPIVACAINE HYDROCHLORIDE 12 ML: 2 INJECTION, SOLUTION EPIDURAL; INFILTRATION at 16:22

## 2019-05-16 RX ADMIN — SODIUM CHLORIDE, SODIUM LACTATE, POTASSIUM CHLORIDE, AND CALCIUM CHLORIDE: .6; .31; .03; .02 INJECTION, SOLUTION INTRAVENOUS at 13:08

## 2019-05-16 RX ADMIN — ROCURONIUM BROMIDE 10 MG: 10 INJECTION, SOLUTION INTRAVENOUS at 14:28

## 2019-05-16 RX ADMIN — PHENYLEPHRINE HYDROCHLORIDE 100 MCG: 10 INJECTION INTRAVENOUS at 16:05

## 2019-05-16 RX ADMIN — PHENYLEPHRINE HYDROCHLORIDE 100 MCG: 10 INJECTION INTRAVENOUS at 16:26

## 2019-05-17 LAB
ANION GAP SERPL CALCULATED.3IONS-SCNC: 3 MMOL/L (ref 4–13)
BASOPHILS # BLD AUTO: 0.01 THOUSANDS/ΜL (ref 0–0.1)
BASOPHILS NFR BLD AUTO: 0 % (ref 0–1)
BUN SERPL-MCNC: 8 MG/DL (ref 5–25)
CALCIUM SERPL-MCNC: 7.9 MG/DL (ref 8.3–10.1)
CHLORIDE SERPL-SCNC: 111 MMOL/L (ref 100–108)
CO2 SERPL-SCNC: 27 MMOL/L (ref 21–32)
CREAT SERPL-MCNC: 0.75 MG/DL (ref 0.6–1.3)
EOSINOPHIL # BLD AUTO: 0 THOUSAND/ΜL (ref 0–0.61)
EOSINOPHIL NFR BLD AUTO: 0 % (ref 0–6)
ERYTHROCYTE [DISTWIDTH] IN BLOOD BY AUTOMATED COUNT: 11.9 % (ref 11.6–15.1)
GFR SERPL CREATININE-BSD FRML MDRD: 93 ML/MIN/1.73SQ M
GLUCOSE SERPL-MCNC: 207 MG/DL (ref 65–140)
HCT VFR BLD AUTO: 28.2 % (ref 34.8–46.1)
HGB BLD-MCNC: 9.5 G/DL (ref 11.5–15.4)
IMM GRANULOCYTES # BLD AUTO: 0.02 THOUSAND/UL (ref 0–0.2)
IMM GRANULOCYTES NFR BLD AUTO: 0 % (ref 0–2)
LYMPHOCYTES # BLD AUTO: 1.09 THOUSANDS/ΜL (ref 0.6–4.47)
LYMPHOCYTES NFR BLD AUTO: 12 % (ref 14–44)
MCH RBC QN AUTO: 30.7 PG (ref 26.8–34.3)
MCHC RBC AUTO-ENTMCNC: 33.7 G/DL (ref 31.4–37.4)
MCV RBC AUTO: 91 FL (ref 82–98)
MONOCYTES # BLD AUTO: 1.08 THOUSAND/ΜL (ref 0.17–1.22)
MONOCYTES NFR BLD AUTO: 11 % (ref 4–12)
NEUTROPHILS # BLD AUTO: 7.26 THOUSANDS/ΜL (ref 1.85–7.62)
NEUTS SEG NFR BLD AUTO: 77 % (ref 43–75)
NRBC BLD AUTO-RTO: 0 /100 WBCS
PLATELET # BLD AUTO: 206 THOUSANDS/UL (ref 149–390)
PMV BLD AUTO: 10.7 FL (ref 8.9–12.7)
POTASSIUM SERPL-SCNC: 4.1 MMOL/L (ref 3.5–5.3)
RBC # BLD AUTO: 3.09 MILLION/UL (ref 3.81–5.12)
SODIUM SERPL-SCNC: 141 MMOL/L (ref 136–145)
WBC # BLD AUTO: 9.46 THOUSAND/UL (ref 4.31–10.16)

## 2019-05-17 PROCEDURE — 80048 BASIC METABOLIC PNL TOTAL CA: CPT | Performed by: OBSTETRICS & GYNECOLOGY

## 2019-05-17 PROCEDURE — 99024 POSTOP FOLLOW-UP VISIT: CPT | Performed by: OBSTETRICS & GYNECOLOGY

## 2019-05-17 PROCEDURE — 85025 COMPLETE CBC W/AUTO DIFF WBC: CPT | Performed by: OBSTETRICS & GYNECOLOGY

## 2019-05-17 PROCEDURE — 94760 N-INVAS EAR/PLS OXIMETRY 1: CPT

## 2019-05-17 RX ORDER — KETOROLAC TROMETHAMINE 30 MG/ML
30 INJECTION, SOLUTION INTRAMUSCULAR; INTRAVENOUS EVERY 6 HOURS SCHEDULED
Status: COMPLETED | OUTPATIENT
Start: 2019-05-17 | End: 2019-05-19

## 2019-05-17 RX ORDER — ACETAMINOPHEN 325 MG/1
650 TABLET ORAL EVERY 8 HOURS PRN
Status: DISPENSED | OUTPATIENT
Start: 2019-05-17 | End: 2019-05-18

## 2019-05-17 RX ADMIN — ROPIVACAINE HYDROCHLORIDE: 5 INJECTION, SOLUTION EPIDURAL; INFILTRATION; PERINEURAL at 20:00

## 2019-05-17 RX ADMIN — DEXTROSE, SODIUM CHLORIDE, AND POTASSIUM CHLORIDE 75 ML/HR: 5; .45; .15 INJECTION INTRAVENOUS at 11:41

## 2019-05-17 RX ADMIN — KETOROLAC TROMETHAMINE 30 MG: 30 INJECTION, SOLUTION INTRAMUSCULAR at 23:10

## 2019-05-17 RX ADMIN — DEXTROSE, SODIUM CHLORIDE, AND POTASSIUM CHLORIDE 125 ML/HR: 5; .45; .15 INJECTION INTRAVENOUS at 02:23

## 2019-05-17 RX ADMIN — ROPIVACAINE HYDROCHLORIDE: 5 INJECTION, SOLUTION EPIDURAL; INFILTRATION; PERINEURAL at 06:01

## 2019-05-17 RX ADMIN — KETOROLAC TROMETHAMINE 30 MG: 30 INJECTION, SOLUTION INTRAMUSCULAR at 12:24

## 2019-05-17 RX ADMIN — KETOROLAC TROMETHAMINE 30 MG: 30 INJECTION, SOLUTION INTRAMUSCULAR at 17:01

## 2019-05-17 RX ADMIN — DEXTROSE, SODIUM CHLORIDE, AND POTASSIUM CHLORIDE 75 ML/HR: 5; .45; .15 INJECTION INTRAVENOUS at 23:15

## 2019-05-17 RX ADMIN — HEPARIN SODIUM 5000 UNITS: 5000 INJECTION, SOLUTION INTRAVENOUS; SUBCUTANEOUS at 08:20

## 2019-05-17 RX ADMIN — ACETAMINOPHEN 650 MG: 325 TABLET ORAL at 10:59

## 2019-05-17 RX ADMIN — HEPARIN SODIUM 5000 UNITS: 5000 INJECTION, SOLUTION INTRAVENOUS; SUBCUTANEOUS at 21:07

## 2019-05-18 LAB
ANION GAP SERPL CALCULATED.3IONS-SCNC: 5 MMOL/L (ref 4–13)
BASOPHILS # BLD AUTO: 0.05 THOUSANDS/ΜL (ref 0–0.1)
BASOPHILS NFR BLD AUTO: 1 % (ref 0–1)
BUN SERPL-MCNC: 9 MG/DL (ref 5–25)
CALCIUM SERPL-MCNC: 7.4 MG/DL (ref 8.3–10.1)
CHLORIDE SERPL-SCNC: 110 MMOL/L (ref 100–108)
CO2 SERPL-SCNC: 26 MMOL/L (ref 21–32)
CREAT SERPL-MCNC: 0.83 MG/DL (ref 0.6–1.3)
EOSINOPHIL # BLD AUTO: 0.47 THOUSAND/ΜL (ref 0–0.61)
EOSINOPHIL NFR BLD AUTO: 5 % (ref 0–6)
ERYTHROCYTE [DISTWIDTH] IN BLOOD BY AUTOMATED COUNT: 12.1 % (ref 11.6–15.1)
GFR SERPL CREATININE-BSD FRML MDRD: 82 ML/MIN/1.73SQ M
GLUCOSE SERPL-MCNC: 160 MG/DL (ref 65–140)
HCT VFR BLD AUTO: 27.5 % (ref 34.8–46.1)
HGB BLD-MCNC: 8.8 G/DL (ref 11.5–15.4)
IMM GRANULOCYTES # BLD AUTO: 0.03 THOUSAND/UL (ref 0–0.2)
IMM GRANULOCYTES NFR BLD AUTO: 0 % (ref 0–2)
LYMPHOCYTES # BLD AUTO: 2.26 THOUSANDS/ΜL (ref 0.6–4.47)
LYMPHOCYTES NFR BLD AUTO: 26 % (ref 14–44)
MCH RBC QN AUTO: 30.2 PG (ref 26.8–34.3)
MCHC RBC AUTO-ENTMCNC: 32 G/DL (ref 31.4–37.4)
MCV RBC AUTO: 95 FL (ref 82–98)
MONOCYTES # BLD AUTO: 0.78 THOUSAND/ΜL (ref 0.17–1.22)
MONOCYTES NFR BLD AUTO: 9 % (ref 4–12)
NEUTROPHILS # BLD AUTO: 5.14 THOUSANDS/ΜL (ref 1.85–7.62)
NEUTS SEG NFR BLD AUTO: 59 % (ref 43–75)
NRBC BLD AUTO-RTO: 0 /100 WBCS
PLATELET # BLD AUTO: 174 THOUSANDS/UL (ref 149–390)
PMV BLD AUTO: 11 FL (ref 8.9–12.7)
POTASSIUM SERPL-SCNC: 3.9 MMOL/L (ref 3.5–5.3)
RBC # BLD AUTO: 2.91 MILLION/UL (ref 3.81–5.12)
SODIUM SERPL-SCNC: 141 MMOL/L (ref 136–145)
WBC # BLD AUTO: 8.73 THOUSAND/UL (ref 4.31–10.16)

## 2019-05-18 PROCEDURE — 80048 BASIC METABOLIC PNL TOTAL CA: CPT | Performed by: OBSTETRICS & GYNECOLOGY

## 2019-05-18 PROCEDURE — 99024 POSTOP FOLLOW-UP VISIT: CPT | Performed by: OBSTETRICS & GYNECOLOGY

## 2019-05-18 PROCEDURE — 85025 COMPLETE CBC W/AUTO DIFF WBC: CPT | Performed by: OBSTETRICS & GYNECOLOGY

## 2019-05-18 RX ORDER — OXYCODONE HYDROCHLORIDE 10 MG/1
10 TABLET ORAL EVERY 4 HOURS PRN
Status: DISCONTINUED | OUTPATIENT
Start: 2019-05-18 | End: 2019-05-19

## 2019-05-18 RX ORDER — DOCUSATE SODIUM 100 MG/1
100 CAPSULE, LIQUID FILLED ORAL 2 TIMES DAILY
Status: DISCONTINUED | OUTPATIENT
Start: 2019-05-18 | End: 2019-05-20 | Stop reason: HOSPADM

## 2019-05-18 RX ORDER — KETOROLAC TROMETHAMINE 30 MG/ML
30 INJECTION, SOLUTION INTRAMUSCULAR; INTRAVENOUS EVERY 6 HOURS SCHEDULED
Status: DISCONTINUED | OUTPATIENT
Start: 2019-05-19 | End: 2019-05-20

## 2019-05-18 RX ORDER — CYCLOBENZAPRINE HCL 5 MG
5 TABLET ORAL 3 TIMES DAILY PRN
Status: DISCONTINUED | OUTPATIENT
Start: 2019-05-18 | End: 2019-05-20 | Stop reason: HOSPADM

## 2019-05-18 RX ORDER — OXYCODONE HYDROCHLORIDE 5 MG/1
5 TABLET ORAL EVERY 4 HOURS PRN
Status: DISCONTINUED | OUTPATIENT
Start: 2019-05-18 | End: 2019-05-19

## 2019-05-18 RX ORDER — HEPARIN SODIUM 5000 [USP'U]/ML
5000 INJECTION, SOLUTION INTRAVENOUS; SUBCUTANEOUS ONCE
Status: COMPLETED | OUTPATIENT
Start: 2019-05-18 | End: 2019-05-18

## 2019-05-18 RX ORDER — POLYETHYLENE GLYCOL 3350 17 G/17G
17 POWDER, FOR SOLUTION ORAL DAILY
Status: DISCONTINUED | OUTPATIENT
Start: 2019-05-18 | End: 2019-05-20 | Stop reason: HOSPADM

## 2019-05-18 RX ORDER — ACETAMINOPHEN 325 MG/1
650 TABLET ORAL EVERY 6 HOURS SCHEDULED
Status: DISCONTINUED | OUTPATIENT
Start: 2019-05-18 | End: 2019-05-19

## 2019-05-18 RX ORDER — OXYCODONE HYDROCHLORIDE 5 MG/1
5 TABLET ORAL EVERY 4 HOURS PRN
Qty: 15 TABLET | Refills: 0 | Status: SHIPPED | OUTPATIENT
Start: 2019-05-18 | End: 2019-05-28

## 2019-05-18 RX ADMIN — ACETAMINOPHEN 650 MG: 325 TABLET, FILM COATED ORAL at 23:37

## 2019-05-18 RX ADMIN — DOCUSATE SODIUM 100 MG: 100 CAPSULE, LIQUID FILLED ORAL at 11:01

## 2019-05-18 RX ADMIN — HEPARIN SODIUM 5000 UNITS: 5000 INJECTION, SOLUTION INTRAVENOUS; SUBCUTANEOUS at 12:20

## 2019-05-18 RX ADMIN — OXYCODONE HYDROCHLORIDE 10 MG: 10 TABLET ORAL at 21:06

## 2019-05-18 RX ADMIN — ENOXAPARIN SODIUM 40 MG: 40 INJECTION SUBCUTANEOUS at 23:37

## 2019-05-18 RX ADMIN — KETOROLAC TROMETHAMINE 30 MG: 30 INJECTION, SOLUTION INTRAMUSCULAR at 05:10

## 2019-05-18 RX ADMIN — DOCUSATE SODIUM 100 MG: 100 CAPSULE, LIQUID FILLED ORAL at 17:00

## 2019-05-18 RX ADMIN — ROPIVACAINE HYDROCHLORIDE: 5 INJECTION, SOLUTION EPIDURAL; INFILTRATION; PERINEURAL at 11:02

## 2019-05-18 RX ADMIN — KETOROLAC TROMETHAMINE 30 MG: 30 INJECTION, SOLUTION INTRAMUSCULAR at 11:02

## 2019-05-18 RX ADMIN — ACETAMINOPHEN 650 MG: 325 TABLET, FILM COATED ORAL at 10:58

## 2019-05-18 RX ADMIN — POLYETHYLENE GLYCOL 3350 17 G: 17 POWDER, FOR SOLUTION ORAL at 10:59

## 2019-05-18 RX ADMIN — ACETAMINOPHEN 650 MG: 325 TABLET, FILM COATED ORAL at 17:00

## 2019-05-18 RX ADMIN — KETOROLAC TROMETHAMINE 30 MG: 30 INJECTION, SOLUTION INTRAMUSCULAR at 17:00

## 2019-05-18 RX ADMIN — KETOROLAC TROMETHAMINE 30 MG: 30 INJECTION, SOLUTION INTRAMUSCULAR at 23:38

## 2019-05-19 LAB
BASOPHILS # BLD AUTO: 0.04 THOUSANDS/ΜL (ref 0–0.1)
BASOPHILS NFR BLD AUTO: 1 % (ref 0–1)
EOSINOPHIL # BLD AUTO: 0.66 THOUSAND/ΜL (ref 0–0.61)
EOSINOPHIL NFR BLD AUTO: 10 % (ref 0–6)
ERYTHROCYTE [DISTWIDTH] IN BLOOD BY AUTOMATED COUNT: 12 % (ref 11.6–15.1)
HCT VFR BLD AUTO: 31.2 % (ref 34.8–46.1)
HGB BLD-MCNC: 10.4 G/DL (ref 11.5–15.4)
IMM GRANULOCYTES # BLD AUTO: 0.02 THOUSAND/UL (ref 0–0.2)
IMM GRANULOCYTES NFR BLD AUTO: 0 % (ref 0–2)
LYMPHOCYTES # BLD AUTO: 1.65 THOUSANDS/ΜL (ref 0.6–4.47)
LYMPHOCYTES NFR BLD AUTO: 25 % (ref 14–44)
MCH RBC QN AUTO: 30.2 PG (ref 26.8–34.3)
MCHC RBC AUTO-ENTMCNC: 33.3 G/DL (ref 31.4–37.4)
MCV RBC AUTO: 91 FL (ref 82–98)
MONOCYTES # BLD AUTO: 0.52 THOUSAND/ΜL (ref 0.17–1.22)
MONOCYTES NFR BLD AUTO: 8 % (ref 4–12)
NEUTROPHILS # BLD AUTO: 3.79 THOUSANDS/ΜL (ref 1.85–7.62)
NEUTS SEG NFR BLD AUTO: 56 % (ref 43–75)
NRBC BLD AUTO-RTO: 0 /100 WBCS
PLATELET # BLD AUTO: 219 THOUSANDS/UL (ref 149–390)
PMV BLD AUTO: 10.7 FL (ref 8.9–12.7)
RBC # BLD AUTO: 3.44 MILLION/UL (ref 3.81–5.12)
WBC # BLD AUTO: 6.68 THOUSAND/UL (ref 4.31–10.16)

## 2019-05-19 PROCEDURE — 94762 N-INVAS EAR/PLS OXIMTRY CONT: CPT

## 2019-05-19 PROCEDURE — 99024 POSTOP FOLLOW-UP VISIT: CPT | Performed by: OBSTETRICS & GYNECOLOGY

## 2019-05-19 PROCEDURE — 85025 COMPLETE CBC W/AUTO DIFF WBC: CPT | Performed by: OBSTETRICS & GYNECOLOGY

## 2019-05-19 RX ORDER — METHOCARBAMOL 500 MG/1
500 TABLET, FILM COATED ORAL EVERY 6 HOURS SCHEDULED
Status: DISCONTINUED | OUTPATIENT
Start: 2019-05-19 | End: 2019-05-20 | Stop reason: HOSPADM

## 2019-05-19 RX ORDER — ONDANSETRON 2 MG/ML
4 INJECTION INTRAMUSCULAR; INTRAVENOUS EVERY 6 HOURS PRN
Status: DISCONTINUED | OUTPATIENT
Start: 2019-05-19 | End: 2019-05-19

## 2019-05-19 RX ORDER — METOCLOPRAMIDE HYDROCHLORIDE 5 MG/ML
5 INJECTION INTRAMUSCULAR; INTRAVENOUS EVERY 6 HOURS PRN
Status: DISCONTINUED | OUTPATIENT
Start: 2019-05-19 | End: 2019-05-20 | Stop reason: HOSPADM

## 2019-05-19 RX ORDER — ACETAMINOPHEN 325 MG/1
975 TABLET ORAL EVERY 8 HOURS SCHEDULED
Status: DISCONTINUED | OUTPATIENT
Start: 2019-05-19 | End: 2019-05-20 | Stop reason: HOSPADM

## 2019-05-19 RX ORDER — METOCLOPRAMIDE HYDROCHLORIDE 5 MG/ML
5 INJECTION INTRAMUSCULAR; INTRAVENOUS EVERY 6 HOURS PRN
Status: DISCONTINUED | OUTPATIENT
Start: 2019-05-19 | End: 2019-05-19

## 2019-05-19 RX ORDER — DIAZEPAM 5 MG/1
10 TABLET ORAL ONCE
Status: COMPLETED | OUTPATIENT
Start: 2019-05-19 | End: 2019-05-19

## 2019-05-19 RX ADMIN — DOCUSATE SODIUM 100 MG: 100 CAPSULE, LIQUID FILLED ORAL at 08:38

## 2019-05-19 RX ADMIN — OXYCODONE HYDROCHLORIDE 10 MG: 10 TABLET ORAL at 00:56

## 2019-05-19 RX ADMIN — POLYETHYLENE GLYCOL 3350 17 G: 17 POWDER, FOR SOLUTION ORAL at 08:38

## 2019-05-19 RX ADMIN — METHOCARBAMOL 500 MG: 500 TABLET, FILM COATED ORAL at 17:42

## 2019-05-19 RX ADMIN — KETOROLAC TROMETHAMINE 30 MG: 30 INJECTION, SOLUTION INTRAMUSCULAR at 05:21

## 2019-05-19 RX ADMIN — METHOCARBAMOL 500 MG: 500 TABLET, FILM COATED ORAL at 11:40

## 2019-05-19 RX ADMIN — MORPHINE SULFATE 2 MG: 2 INJECTION, SOLUTION INTRAMUSCULAR; INTRAVENOUS at 03:08

## 2019-05-19 RX ADMIN — OXYCODONE HYDROCHLORIDE 5 MG: 5 TABLET ORAL at 07:12

## 2019-05-19 RX ADMIN — KETOROLAC TROMETHAMINE 30 MG: 30 INJECTION, SOLUTION INTRAMUSCULAR at 17:42

## 2019-05-19 RX ADMIN — OXYCODONE HYDROCHLORIDE 10 MG: 10 TABLET ORAL at 05:28

## 2019-05-19 RX ADMIN — MORPHINE SULFATE 2 MG: 2 INJECTION, SOLUTION INTRAMUSCULAR; INTRAVENOUS at 07:12

## 2019-05-19 RX ADMIN — DIAZEPAM 10 MG: 5 TABLET ORAL at 11:40

## 2019-05-19 RX ADMIN — Medication: at 08:38

## 2019-05-19 RX ADMIN — ACETAMINOPHEN 650 MG: 325 TABLET, FILM COATED ORAL at 05:16

## 2019-05-19 RX ADMIN — ACETAMINOPHEN 975 MG: 325 TABLET, FILM COATED ORAL at 21:05

## 2019-05-19 RX ADMIN — CYCLOBENZAPRINE HYDROCHLORIDE 5 MG: 5 TABLET, FILM COATED ORAL at 15:29

## 2019-05-19 RX ADMIN — ACETAMINOPHEN 975 MG: 325 TABLET, FILM COATED ORAL at 13:28

## 2019-05-19 RX ADMIN — CYCLOBENZAPRINE HYDROCHLORIDE 5 MG: 5 TABLET, FILM COATED ORAL at 21:05

## 2019-05-19 RX ADMIN — DOCUSATE SODIUM 100 MG: 100 CAPSULE, LIQUID FILLED ORAL at 17:42

## 2019-05-20 VITALS
DIASTOLIC BLOOD PRESSURE: 77 MMHG | TEMPERATURE: 98.6 F | SYSTOLIC BLOOD PRESSURE: 111 MMHG | HEIGHT: 61 IN | RESPIRATION RATE: 18 BRPM | WEIGHT: 150 LBS | OXYGEN SATURATION: 97 % | HEART RATE: 70 BPM | BODY MASS INDEX: 28.32 KG/M2

## 2019-05-20 PROCEDURE — 99024 POSTOP FOLLOW-UP VISIT: CPT | Performed by: OBSTETRICS & GYNECOLOGY

## 2019-05-20 RX ORDER — IBUPROFEN 600 MG/1
600 TABLET ORAL EVERY 6 HOURS SCHEDULED
Status: DISCONTINUED | OUTPATIENT
Start: 2019-05-20 | End: 2019-05-20 | Stop reason: HOSPADM

## 2019-05-20 RX ORDER — OXYCODONE HYDROCHLORIDE 10 MG/1
10 TABLET ORAL EVERY 4 HOURS PRN
Status: DISCONTINUED | OUTPATIENT
Start: 2019-05-20 | End: 2019-05-20 | Stop reason: HOSPADM

## 2019-05-20 RX ORDER — IBUPROFEN 600 MG/1
600 TABLET ORAL EVERY 6 HOURS SCHEDULED
Qty: 30 TABLET | Refills: 0 | Status: SHIPPED | OUTPATIENT
Start: 2019-05-20 | End: 2019-07-29 | Stop reason: ALTCHOICE

## 2019-05-20 RX ORDER — CYCLOBENZAPRINE HCL 5 MG
5 TABLET ORAL 3 TIMES DAILY PRN
Qty: 20 TABLET | Refills: 0 | Status: SHIPPED | OUTPATIENT
Start: 2019-05-20 | End: 2019-05-20

## 2019-05-20 RX ORDER — FERROUS SULFATE 325(65) MG
325 TABLET ORAL EVERY OTHER DAY
Status: DISCONTINUED | OUTPATIENT
Start: 2019-05-20 | End: 2019-05-20 | Stop reason: HOSPADM

## 2019-05-20 RX ORDER — DOCUSATE SODIUM 100 MG/1
100 CAPSULE, LIQUID FILLED ORAL 2 TIMES DAILY
Qty: 10 CAPSULE | Refills: 0 | Status: SHIPPED | OUTPATIENT
Start: 2019-05-20 | End: 2019-05-20

## 2019-05-20 RX ORDER — DOCUSATE SODIUM 100 MG/1
100 CAPSULE, LIQUID FILLED ORAL 2 TIMES DAILY
Qty: 20 CAPSULE | Refills: 0 | Status: SHIPPED | OUTPATIENT
Start: 2019-05-20 | End: 2019-12-24 | Stop reason: ALTCHOICE

## 2019-05-20 RX ORDER — CYCLOBENZAPRINE HCL 5 MG
5 TABLET ORAL 3 TIMES DAILY PRN
Qty: 20 TABLET | Refills: 0 | Status: SHIPPED | OUTPATIENT
Start: 2019-05-20 | End: 2019-07-29 | Stop reason: ALTCHOICE

## 2019-05-20 RX ORDER — METHOCARBAMOL 500 MG/1
500 TABLET, FILM COATED ORAL EVERY 6 HOURS SCHEDULED
Qty: 20 TABLET | Refills: 0 | Status: SHIPPED | OUTPATIENT
Start: 2019-05-20 | End: 2019-07-29 | Stop reason: ALTCHOICE

## 2019-05-20 RX ORDER — OXYCODONE HYDROCHLORIDE 5 MG/1
5 TABLET ORAL EVERY 4 HOURS PRN
Status: DISCONTINUED | OUTPATIENT
Start: 2019-05-20 | End: 2019-05-20 | Stop reason: HOSPADM

## 2019-05-20 RX ORDER — METHOCARBAMOL 500 MG/1
500 TABLET, FILM COATED ORAL EVERY 6 HOURS SCHEDULED
Qty: 20 TABLET | Refills: 0 | Status: SHIPPED | OUTPATIENT
Start: 2019-05-20 | End: 2019-05-20

## 2019-05-20 RX ORDER — IBUPROFEN 600 MG/1
600 TABLET ORAL EVERY 6 HOURS SCHEDULED
Qty: 30 TABLET | Refills: 0 | Status: SHIPPED | OUTPATIENT
Start: 2019-05-20 | End: 2019-05-20

## 2019-05-20 RX ADMIN — METHOCARBAMOL 500 MG: 500 TABLET, FILM COATED ORAL at 06:18

## 2019-05-20 RX ADMIN — IBUPROFEN 600 MG: 600 TABLET ORAL at 08:11

## 2019-05-20 RX ADMIN — ACETAMINOPHEN 975 MG: 325 TABLET, FILM COATED ORAL at 13:54

## 2019-05-20 RX ADMIN — METHOCARBAMOL 500 MG: 500 TABLET, FILM COATED ORAL at 11:20

## 2019-05-20 RX ADMIN — ENOXAPARIN SODIUM 40 MG: 40 INJECTION SUBCUTANEOUS at 00:54

## 2019-05-20 RX ADMIN — CYCLOBENZAPRINE HYDROCHLORIDE 5 MG: 5 TABLET, FILM COATED ORAL at 08:58

## 2019-05-20 RX ADMIN — FERROUS SULFATE TAB 325 MG (65 MG ELEMENTAL FE) 325 MG: 325 (65 FE) TAB at 13:54

## 2019-05-20 RX ADMIN — CYCLOBENZAPRINE HYDROCHLORIDE 5 MG: 5 TABLET, FILM COATED ORAL at 13:54

## 2019-05-20 RX ADMIN — METHOCARBAMOL 500 MG: 500 TABLET, FILM COATED ORAL at 00:54

## 2019-05-20 RX ADMIN — IBUPROFEN 600 MG: 600 TABLET ORAL at 12:42

## 2019-05-20 RX ADMIN — OXYCODONE HYDROCHLORIDE 10 MG: 10 TABLET ORAL at 08:58

## 2019-05-20 RX ADMIN — KETOROLAC TROMETHAMINE 30 MG: 30 INJECTION, SOLUTION INTRAMUSCULAR at 00:54

## 2019-05-20 RX ADMIN — DOCUSATE SODIUM 100 MG: 100 CAPSULE, LIQUID FILLED ORAL at 08:12

## 2019-05-20 RX ADMIN — POLYETHYLENE GLYCOL 3350 17 G: 17 POWDER, FOR SOLUTION ORAL at 08:12

## 2019-05-20 RX ADMIN — ACETAMINOPHEN 975 MG: 325 TABLET, FILM COATED ORAL at 06:18

## 2019-05-20 RX ADMIN — KETOROLAC TROMETHAMINE 30 MG: 30 INJECTION, SOLUTION INTRAMUSCULAR at 06:19

## 2019-05-21 ENCOUNTER — TELEPHONE (OUTPATIENT)
Dept: GYNECOLOGIC ONCOLOGY | Facility: CLINIC | Age: 49
End: 2019-05-21

## 2019-05-28 PROBLEM — C56.2 MALIGNANT NEOPLASM OF LEFT OVARY (HCC): Status: ACTIVE | Noted: 2019-05-28

## 2019-05-29 ENCOUNTER — OFFICE VISIT (OUTPATIENT)
Dept: GYNECOLOGIC ONCOLOGY | Facility: CLINIC | Age: 49
End: 2019-05-29
Payer: COMMERCIAL

## 2019-05-29 VITALS
TEMPERATURE: 98.6 F | SYSTOLIC BLOOD PRESSURE: 150 MMHG | BODY MASS INDEX: 26.15 KG/M2 | DIASTOLIC BLOOD PRESSURE: 92 MMHG | RESPIRATION RATE: 18 BRPM | HEIGHT: 61 IN | HEART RATE: 80 BPM | WEIGHT: 138.5 LBS

## 2019-05-29 DIAGNOSIS — C56.2 MALIGNANT NEOPLASM OF LEFT OVARY (HCC): Primary | ICD-10-CM

## 2019-05-29 PROCEDURE — 99215 OFFICE O/P EST HI 40 MIN: CPT | Performed by: OBSTETRICS & GYNECOLOGY

## 2019-05-29 RX ORDER — CIPROFLOXACIN 500 MG/1
500 TABLET, FILM COATED ORAL EVERY 12 HOURS SCHEDULED
Qty: 14 TABLET | Refills: 0 | Status: SHIPPED | OUTPATIENT
Start: 2019-05-29 | End: 2019-06-05

## 2019-05-31 DIAGNOSIS — C56.2 MALIGNANT NEOPLASM OF LEFT OVARY (HCC): Primary | ICD-10-CM

## 2019-05-31 PROBLEM — Z45.2 ENCOUNTER FOR CARE RELATED TO VASCULAR ACCESS PORT: Status: ACTIVE | Noted: 2019-05-31

## 2019-05-31 RX ORDER — LORAZEPAM 1 MG/1
1 TABLET ORAL EVERY 6 HOURS PRN
Qty: 20 TABLET | Refills: 1 | Status: SHIPPED | OUTPATIENT
Start: 2019-05-31 | End: 2019-07-12

## 2019-05-31 RX ORDER — ONDANSETRON HYDROCHLORIDE 8 MG/1
8 TABLET, FILM COATED ORAL EVERY 8 HOURS PRN
Qty: 20 TABLET | Refills: 1 | Status: SHIPPED | OUTPATIENT
Start: 2019-05-31 | End: 2019-07-12

## 2019-06-04 ENCOUNTER — TELEPHONE (OUTPATIENT)
Dept: INTERVENTIONAL RADIOLOGY/VASCULAR | Facility: HOSPITAL | Age: 49
End: 2019-06-04

## 2019-06-10 ENCOUNTER — HOSPITAL ENCOUNTER (OUTPATIENT)
Dept: RADIOLOGY | Facility: HOSPITAL | Age: 49
Discharge: HOME | End: 2019-06-10

## 2019-06-28 ENCOUNTER — OFFICE VISIT (OUTPATIENT)
Dept: GYNECOLOGIC ONCOLOGY | Facility: CLINIC | Age: 49
End: 2019-06-28
Payer: COMMERCIAL

## 2019-06-28 VITALS
BODY MASS INDEX: 25.49 KG/M2 | TEMPERATURE: 98.1 F | WEIGHT: 135 LBS | RESPIRATION RATE: 16 BRPM | HEIGHT: 61 IN | DIASTOLIC BLOOD PRESSURE: 84 MMHG | SYSTOLIC BLOOD PRESSURE: 124 MMHG | HEART RATE: 67 BPM

## 2019-06-28 DIAGNOSIS — C56.2 MALIGNANT NEOPLASM OF LEFT OVARY (HCC): Primary | ICD-10-CM

## 2019-06-28 DIAGNOSIS — C56.9 MALIGNANT NEOPLASM OF OVARY, UNSPECIFIED LATERALITY (HCC): Primary | ICD-10-CM

## 2019-06-28 DIAGNOSIS — C56.2 MALIGNANT NEOPLASM OF LEFT OVARY (HCC): ICD-10-CM

## 2019-06-28 PROCEDURE — 99215 OFFICE O/P EST HI 40 MIN: CPT | Performed by: OBSTETRICS & GYNECOLOGY

## 2019-07-12 ENCOUNTER — HOSPITAL ENCOUNTER (OUTPATIENT)
Dept: RADIOLOGY | Facility: HOSPITAL | Age: 49
Discharge: HOME | End: 2019-07-12

## 2019-07-12 DIAGNOSIS — C56.2 MALIGNANT NEOPLASM OF LEFT OVARY (HCC): Primary | ICD-10-CM

## 2019-07-12 NOTE — PROGRESS NOTES
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

## 2019-07-15 ENCOUNTER — HOSPITAL ENCOUNTER (OUTPATIENT)
Dept: RADIOLOGY | Facility: HOSPITAL | Age: 49
Discharge: HOME | End: 2019-07-15

## 2019-07-16 ENCOUNTER — HOSPITAL ENCOUNTER (OUTPATIENT)
Dept: INFUSION CENTER | Facility: HOSPITAL | Age: 49
Discharge: HOME | End: 2019-07-16

## 2019-07-18 ENCOUNTER — HOSPITAL ENCOUNTER (OUTPATIENT)
Dept: INFUSION CENTER | Facility: HOSPITAL | Age: 49
Discharge: HOME | End: 2019-07-18

## 2019-07-23 LAB — HBA1C MFR BLD HPLC: 5.9 %

## 2019-07-29 ENCOUNTER — OFFICE VISIT (OUTPATIENT)
Dept: FAMILY MEDICINE CLINIC | Facility: CLINIC | Age: 49
End: 2019-07-29
Payer: COMMERCIAL

## 2019-07-29 VITALS
DIASTOLIC BLOOD PRESSURE: 80 MMHG | TEMPERATURE: 98.3 F | HEIGHT: 61 IN | SYSTOLIC BLOOD PRESSURE: 120 MMHG | WEIGHT: 133.9 LBS | HEART RATE: 66 BPM | BODY MASS INDEX: 25.28 KG/M2 | OXYGEN SATURATION: 98 %

## 2019-07-29 DIAGNOSIS — E55.9 VITAMIN D DEFICIENCY: ICD-10-CM

## 2019-07-29 DIAGNOSIS — C56.2 MALIGNANT NEOPLASM OF LEFT OVARY (HCC): Primary | ICD-10-CM

## 2019-07-29 DIAGNOSIS — E78.5 HYPERLIPIDEMIA, UNSPECIFIED HYPERLIPIDEMIA TYPE: ICD-10-CM

## 2019-07-29 PROCEDURE — 1036F TOBACCO NON-USER: CPT | Performed by: FAMILY MEDICINE

## 2019-07-29 PROCEDURE — 3008F BODY MASS INDEX DOCD: CPT | Performed by: FAMILY MEDICINE

## 2019-07-29 PROCEDURE — 99213 OFFICE O/P EST LOW 20 MIN: CPT | Performed by: FAMILY MEDICINE

## 2019-07-29 NOTE — ASSESSMENT & PLAN NOTE
Patient actually brought me a copy of the book for me to read myself  I told her although the evidence looks good to her, she cannot read a medical study and there are no medical studies documenting that this is the weight cancer should be treated  Nevertheless, the drugs and she is asking for not out of the question, given her comorbidities  Her labs reveal that she does have impaired fasting glucose  Last labs that I got from 2 years ago showed she has hyperlipidemia as well  I ordered a repeat lipid panel  If elevated I would be willing to start the patient on a statin  She asked me to order a Candida albicans blood test because of something she read  She thinks it is going to be covered because I ordered it  I suspected still not going to be covered as there is no clear-cut indication  I encouraged the patient to continue routine follow-up for tumor surveillance with Dr Chapis Loya  I will make further recommendations to the patient when I see this blood work

## 2019-07-29 NOTE — PROGRESS NOTES
Assessment/Plan:    Malignant neoplasm of left ovary [facility])  Patient actually brought me a copy of the book for me to read myself  I told her although the evidence looks good to her, she cannot read a medical study and there are no medical studies documenting that this is the weight cancer should be treated  Nevertheless, the drugs and she is asking for not out of the question, given her comorbidities  Her labs reveal that she does have impaired fasting glucose  Last labs that I got from 2 years ago showed she has hyperlipidemia as well  I ordered a repeat lipid panel  If elevated I would be willing to start the patient on a statin  She asked me to order a Candida albicans blood test because of something she read  She thinks it is going to be covered because I ordered it  I suspected still not going to be covered as there is no clear-cut indication  I encouraged the patient to continue routine follow-up for tumor surveillance with Dr Cody Dobbs  I will make further recommendations to the patient when I see this blood work  Diagnoses and all orders for this visit:    Malignant neoplasm of left ovary (HCC)  -     Candida albicans IgE; Future    Vitamin D deficiency  -     Vitamin D 1,25 dihydroxy; Future    Hyperlipidemia, unspecified hyperlipidemia type  -     Lipid panel; Future          Subjective:      Patient ID: Giselle Heard is a 44 y o  female  This patient is a 41-year-old white female who presents to the office today for her follow-up visit  The patient is status post total abdominal hysterectomy with bilateral salpingo oophorectomy and extensive tumor debulking  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  The patient is currently seeing Dr Cody Dobbs, who also did her surgery  He advised the patient that clear cell really does not respond well to chemotherapy but it is currently recommended anyway  Patient went to [facility] for a 2nd opinion    She did not like the doctor there  However, they basically told her the same thing  Patient began reading books and is convince she can treat her cancer with changes in diet  She also believes that some medications may help her, based upon the books that she read  However, there is no FDA indication for treatment with these medications  She believes a statin will help to starve the cancer stem cells and that metformin will also start of them of glucose, causing them to die  She when out had some blood work done on her own  She was actually due for her yearly blood work around the time that this tumor was detected  She never had it done  She brought with her the blood work that she paid to have done on her own  The following portions of the patient's history were reviewed and updated as appropriate: allergies, current medications, past family history, past medical history, past social history, past surgical history and problem list      Review of Systems   Cardiovascular: Negative for chest pain, palpitations and leg swelling  Gastrointestinal: Negative for abdominal pain, constipation, diarrhea, nausea and vomiting  Psychiatric/Behavioral: Negative for dysphoric mood  Objective:      /80   Pulse 66   Temp 98 3 °F (36 8 °C) (Tympanic)   Ht 5' 1" (1 549 m)   Wt 60 7 kg (133 lb 14 4 oz)   LMP 01/02/2019 (LMP Unknown)   SpO2 98%   BMI 25 30 kg/m²          Physical Exam   Constitutional:   Patient is a 26-year-old white female who appears her stated age  She is pleasant and cooperative  She seems to be in good spirits  She is in no distress  HENT:   Head: Normocephalic and atraumatic  Right Ear: External ear normal    Left Ear: External ear normal    Mouth/Throat: Oropharynx is clear and moist  No oropharyngeal exudate  Eyes: Pupils are equal, round, and reactive to light  Conjunctivae are normal  No scleral icterus  Neck: Neck supple  No tracheal deviation present  No thyromegaly present  Cardiovascular: Normal rate, regular rhythm and normal heart sounds  Exam reveals no gallop and no friction rub  No murmur heard  Pulmonary/Chest: Effort normal and breath sounds normal  No stridor  No respiratory distress  She has no wheezes  She has no rales  Abdominal: Soft  Bowel sounds are normal  She exhibits no distension and no mass  There is no tenderness  There is no guarding  There is a large midline scar present  There were no masses present  There was no organomegaly noted  Vitals reviewed      Extremities:  Without cyanosis, clubbing, or edema

## 2019-08-01 ENCOUNTER — APPOINTMENT (OUTPATIENT)
Dept: LAB | Facility: HOSPITAL | Age: 49
End: 2019-08-01
Attending: FAMILY MEDICINE
Payer: COMMERCIAL

## 2019-08-01 DIAGNOSIS — E55.9 VITAMIN D DEFICIENCY: ICD-10-CM

## 2019-08-01 DIAGNOSIS — E78.5 HYPERLIPIDEMIA, UNSPECIFIED HYPERLIPIDEMIA TYPE: ICD-10-CM

## 2019-08-01 DIAGNOSIS — C56.2 MALIGNANT NEOPLASM OF LEFT OVARY (HCC): ICD-10-CM

## 2019-08-01 LAB
CHOLEST SERPL-MCNC: 216 MG/DL (ref 50–200)
HDLC SERPL-MCNC: 63 MG/DL (ref 40–60)
LDLC SERPL CALC-MCNC: 137 MG/DL (ref 0–100)
NONHDLC SERPL-MCNC: 153 MG/DL
TRIGL SERPL-MCNC: 78 MG/DL

## 2019-08-01 PROCEDURE — 36415 COLL VENOUS BLD VENIPUNCTURE: CPT

## 2019-08-01 PROCEDURE — 86003 ALLG SPEC IGE CRUDE XTRC EA: CPT

## 2019-08-01 PROCEDURE — 82652 VIT D 1 25-DIHYDROXY: CPT

## 2019-08-01 PROCEDURE — 80061 LIPID PANEL: CPT

## 2019-08-05 ENCOUNTER — TELEPHONE (OUTPATIENT)
Dept: FAMILY MEDICINE CLINIC | Facility: CLINIC | Age: 49
End: 2019-08-05

## 2019-08-05 LAB
1,25(OH)2D3 SERPL-MCNC: 68.2 PG/ML (ref 19.9–79.3)
C ALBICANS IGE QN: <0.1 KU/L

## 2019-08-05 NOTE — TELEPHONE ENCOUNTER
I spoke to the patient and gave her the blood test results  Unfortunately, the vitamin-D level is still pending  She is currently watching her diet and exercise in an attempt to get her cholesterol and blood sugars down  She will consider medication as well

## 2019-09-16 ENCOUNTER — APPOINTMENT (OUTPATIENT)
Dept: LAB | Facility: HOSPITAL | Age: 49
End: 2019-09-16
Payer: COMMERCIAL

## 2019-09-16 DIAGNOSIS — C56.2 MALIGNANT NEOPLASM OF LEFT OVARY (HCC): ICD-10-CM

## 2019-09-16 LAB
ALBUMIN SERPL BCP-MCNC: 4.1 G/DL (ref 3.5–5)
ALP SERPL-CCNC: 67 U/L (ref 46–116)
ALT SERPL W P-5'-P-CCNC: 43 U/L (ref 12–78)
ANION GAP SERPL CALCULATED.3IONS-SCNC: 10 MMOL/L (ref 4–13)
AST SERPL W P-5'-P-CCNC: 20 U/L (ref 5–45)
BASOPHILS # BLD AUTO: 0.06 THOUSANDS/ΜL (ref 0–0.1)
BASOPHILS NFR BLD AUTO: 1 % (ref 0–1)
BILIRUB SERPL-MCNC: 0.4 MG/DL (ref 0.2–1)
BUN SERPL-MCNC: 13 MG/DL (ref 5–25)
CALCIUM SERPL-MCNC: 9.5 MG/DL (ref 8.3–10.1)
CANCER AG125 SERPL-ACNC: 4.9 U/ML (ref 0–30)
CHLORIDE SERPL-SCNC: 104 MMOL/L (ref 100–108)
CO2 SERPL-SCNC: 28 MMOL/L (ref 21–32)
CREAT SERPL-MCNC: 0.82 MG/DL (ref 0.6–1.3)
EOSINOPHIL # BLD AUTO: 0.43 THOUSAND/ΜL (ref 0–0.61)
EOSINOPHIL NFR BLD AUTO: 8 % (ref 0–6)
ERYTHROCYTE [DISTWIDTH] IN BLOOD BY AUTOMATED COUNT: 12.6 % (ref 11.6–15.1)
GFR SERPL CREATININE-BSD FRML MDRD: 83 ML/MIN/1.73SQ M
GLUCOSE P FAST SERPL-MCNC: 105 MG/DL (ref 65–99)
HCT VFR BLD AUTO: 43 % (ref 34.8–46.1)
HGB BLD-MCNC: 13.9 G/DL (ref 11.5–15.4)
IMM GRANULOCYTES # BLD AUTO: 0.01 THOUSAND/UL (ref 0–0.2)
IMM GRANULOCYTES NFR BLD AUTO: 0 % (ref 0–2)
LYMPHOCYTES # BLD AUTO: 1.67 THOUSANDS/ΜL (ref 0.6–4.47)
LYMPHOCYTES NFR BLD AUTO: 30 % (ref 14–44)
MCH RBC QN AUTO: 29.9 PG (ref 26.8–34.3)
MCHC RBC AUTO-ENTMCNC: 32.3 G/DL (ref 31.4–37.4)
MCV RBC AUTO: 93 FL (ref 82–98)
MONOCYTES # BLD AUTO: 0.43 THOUSAND/ΜL (ref 0.17–1.22)
MONOCYTES NFR BLD AUTO: 8 % (ref 4–12)
NEUTROPHILS # BLD AUTO: 2.91 THOUSANDS/ΜL (ref 1.85–7.62)
NEUTS SEG NFR BLD AUTO: 53 % (ref 43–75)
NRBC BLD AUTO-RTO: 0 /100 WBCS
PLATELET # BLD AUTO: 258 THOUSANDS/UL (ref 149–390)
PMV BLD AUTO: 10.9 FL (ref 8.9–12.7)
POTASSIUM SERPL-SCNC: 3.9 MMOL/L (ref 3.5–5.3)
PROT SERPL-MCNC: 7.7 G/DL (ref 6.4–8.2)
RBC # BLD AUTO: 4.65 MILLION/UL (ref 3.81–5.12)
SODIUM SERPL-SCNC: 142 MMOL/L (ref 136–145)
WBC # BLD AUTO: 5.51 THOUSAND/UL (ref 4.31–10.16)

## 2019-09-16 PROCEDURE — 36415 COLL VENOUS BLD VENIPUNCTURE: CPT | Performed by: PHYSICIAN ASSISTANT

## 2019-09-16 PROCEDURE — 85025 COMPLETE CBC W/AUTO DIFF WBC: CPT | Performed by: PHYSICIAN ASSISTANT

## 2019-09-16 PROCEDURE — 80053 COMPREHEN METABOLIC PANEL: CPT | Performed by: PHYSICIAN ASSISTANT

## 2019-09-16 PROCEDURE — 86304 IMMUNOASSAY TUMOR CA 125: CPT

## 2019-09-18 DIAGNOSIS — C56.2 MALIGNANT NEOPLASM OF LEFT OVARY (HCC): Primary | ICD-10-CM

## 2019-09-25 ENCOUNTER — OFFICE VISIT (OUTPATIENT)
Dept: GYNECOLOGIC ONCOLOGY | Facility: CLINIC | Age: 49
End: 2019-09-25
Payer: COMMERCIAL

## 2019-09-25 VITALS
HEIGHT: 61 IN | WEIGHT: 128.5 LBS | BODY MASS INDEX: 24.26 KG/M2 | DIASTOLIC BLOOD PRESSURE: 84 MMHG | TEMPERATURE: 97.7 F | HEART RATE: 69 BPM | SYSTOLIC BLOOD PRESSURE: 130 MMHG | RESPIRATION RATE: 18 BRPM

## 2019-09-25 DIAGNOSIS — C56.9 MALIGNANT NEOPLASM OF OVARY, UNSPECIFIED LATERALITY (HCC): Primary | ICD-10-CM

## 2019-09-25 DIAGNOSIS — C56.2 MALIGNANT NEOPLASM OF LEFT OVARY (HCC): ICD-10-CM

## 2019-09-25 PROCEDURE — 99214 OFFICE O/P EST MOD 30 MIN: CPT | Performed by: OBSTETRICS & GYNECOLOGY

## 2019-09-25 NOTE — ASSESSMENT & PLAN NOTE
The patient is stable without evidence of recurrence of disease  She remains in a clinical remission  She will follow up in 3 months for re-evaluation  A  will be drawn at that time and was ordered  The patient develops any signs of pelvic fullness, vaginal bleeding, bowel or bladder complaint that her persistent for 2-4 weeks she will call us earlier than this

## 2019-09-25 NOTE — LETTER
September 25, 2019     Carmela Pompa [address]   [address]  [place] [address]    Patient: Emmie Del Rosario   YOB: 1970   Date of Visit: 9/25/2019       Dear Dr Herrera [facility]:    Thank you for referring Mukund Mcfarland to me for evaluation  Below are my notes for this consultation  If you have questions, please do not hesitate to call me  I look forward to following your patient along with you  Sincerely,        Charity Zhou MD        CC: No Recipients  Charity Zhou MD  9/25/2019  3:55 PM  Sign at close encounter  Assessment/Plan:    Problem List Items Addressed This Visit        Endocrine    Malignant neoplasm of left ovary [facility])     The patient is stable without evidence of recurrence of disease  She remains in a clinical remission  She will follow up in 3 months for re-evaluation  A  will be drawn at that time and was ordered  The patient develops any signs of pelvic fullness, vaginal bleeding, bowel or bladder complaint that her persistent for 2-4 weeks she will call us earlier than this             Other Visit Diagnoses     Malignant neoplasm of ovary, unspecified laterality ([facility] [address] )    -  Primary    Relevant Orders                CHIEF COMPLAINT:  Ovarian cancer surveillance      Problem:  Cancer Staging  Malignant neoplasm of left ovary (HCC)  Staging form: Ovary, Fallopian Tube, Primary Peritoneal, AJCC 8th Edition  - Clinical stage from 5/28/2019: FIGO Stage I (cT1, cN0, cM0) - Signed by Charity Zhou MD on 5/28/2019  - Pathologic stage from 5/28/2019: FIGO Stage IC1, calculated as Stage IC (pT1c1, pN0, cM0) - Signed by Charity Zhou MD on 5/28/2019        Previous therapy:     Malignant neoplasm of left ovary ([facility] [address] )    5/17/2019 Initial Diagnosis     Malignant neoplasm of left ovary ([facility] [address] )      5/17/2019 Surgery     Patient underwent total abdominal hysterectomy bilateral salpingo oophorectomy radical omentectomy bilateral pelvic lymphadenectomy and staging procedure for 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  5/28/2019 -  Cancer Staged     Staging form: Ovary, Fallopian Tube, Primary Peritoneal, AJCC 8th Edition  - Pathologic stage from 5/28/2019: FIGO Stage IC1, calculated as Stage IC (pT1c1, pN0, cM0) - Signed by Teresa Alvarado MD on 5/28/2019  Histologic grade (G): G3  Histologic grading system: 4 grade system  Residual tumor volume after primary cytoreductive surgery: No gross tumor  Stage used in treatment planning: Yes  National guidelines used in treatment planning: Yes        5/28/2019 -  Cancer Staged     Staging form: Ovary, Fallopian Tube, Primary Peritoneal, AJCC 8th Edition  - Clinical stage from 5/28/2019: FIGO Stage I (cT1, cN0, cM0) - Signed by Teresa Alvarado MD on 5/28/2019  Stage used in treatment planning: Yes  National guidelines used in treatment planning: Yes         Chemotherapy     Carbo platinum area under the curve of 6 paclitaxel 175 milligrams/meter squared for 3-6 cycles  Patient refused treatment           Patient ID: Christina Montgomery is a 44 y o  female  Patient is a very pleasant 64-year-old white female with a history of ovarian cyst   She underwent robotically assisted total laparoscopic hysterectomy bilateral salpingo-oophorectomy and staging procedure for stage IC1 clear cell carcinoma of the ovary  She was recommended carboplatin paclitaxel for 3-6 cycles but refused this  She presents today in surveillance  Overall the patient is doing well she has no new complaint  Today, the patient is doing well  She denies significant abdominal pain, pelvic pain, nausea, vomiting, constipation, diarrhea, fevers, chills, or vaginal bleeding  The following portions of the patient's history were reviewed and updated as appropriate: allergies, current medications, past medical history, past social history, past surgical history and problem list     Review of Systems   Constitutional: Negative      HENT: Negative  Eyes: Negative  Respiratory: Negative  Cardiovascular: Negative  Gastrointestinal: Negative  Endocrine: Negative  Genitourinary: Negative  Musculoskeletal: Negative  Skin: Negative  Neurological: Negative  Hematological: Negative  Psychiatric/Behavioral: Negative  Current Outpatient Medications   Medication Sig Dispense Refill    albuterol (2 5 mg/3 mL) 0 083 % nebulizer solution INHALE CONTENTS OF 1 VIAL IN NEBULIZER EVERY 4 HOURS AS NEEDED  0    docusate sodium (COLACE) 100 mg capsule Take 1 capsule (100 mg total) by mouth 2 (two) times a day 20 capsule 0    patient supplied medication        No current facility-administered medications for this visit  Objective:    Blood pressure 130/84, pulse 69, temperature 97 7 °F (36 5 °C), resp  rate 18, height 5' 1" (1 549 m), weight 58 3 kg (128 lb 8 oz), last menstrual period 01/02/2019  Body mass index is 24 28 kg/m²  Body surface area is 1 57 meters squared  Physical Exam   Constitutional: She is oriented to person, place, and time  She appears well-developed and well-nourished  HENT:   Head: Normocephalic and atraumatic  Eyes: EOM are normal    Neck: Normal range of motion  Neck supple  No thyromegaly present  Cardiovascular: Normal rate, regular rhythm and normal heart sounds  Pulmonary/Chest: Effort normal and breath sounds normal    Abdominal: Soft  Bowel sounds are normal    Well healed laparoscopic incisions  Genitourinary:   Genitourinary Comments: -Normal external female genitalia, normal Bartholin's and [place]'s glands                  -Normal midline urethral meatus   No lesions notes                  -Bladder without fullness mass or tenderness                  -Vagina without lesion or discharge No significant cystocele or rectocele noted                  -Cervix surgically absent                  -Uterus surgically absent                  -Adnexae surgically absent                  - Anus without fissure of lesion     Musculoskeletal: Normal range of motion  Lymphadenopathy:     She has no cervical adenopathy  Neurological: She is alert and oriented to person, place, and time  Skin: Skin is warm and dry  Psychiatric: She has a normal mood and affect   Her behavior is normal        Lab Results   Component Value Date     4 9 09/16/2019     Lab Results   Component Value Date     10/23/2014    K 3 9 09/16/2019     09/16/2019    CO2 28 09/16/2019    ANIONGAP 9 10/23/2014    BUN 13 09/16/2019    CREATININE 0 82 09/16/2019    GLUCOSE 132 10/23/2014    GLUF 105 (H) 09/16/2019    CALCIUM 9 5 09/16/2019    AST 20 09/16/2019    ALT 43 09/16/2019    ALKPHOS 67 09/16/2019    PROT 8 3 (H) 10/23/2014    BILITOT 0 2 10/23/2014    EGFR 83 09/16/2019     Lab Results   Component Value Date    WBC 5 51 09/16/2019    HGB 13 9 09/16/2019    HCT 43 0 09/16/2019    MCV 93 09/16/2019     09/16/2019     Lab Results   Component Value Date    NEUTROABS 2 91 09/16/2019

## 2019-09-25 NOTE — PROGRESS NOTES
Assessment/Plan:    Problem List Items Addressed This Visit        Endocrine    Malignant neoplasm of left ovary ([facility] [address] )     The patient is stable without evidence of recurrence of disease  She remains in a clinical remission  She will follow up in 3 months for re-evaluation  A  will be drawn at that time and was ordered  The patient develops any signs of pelvic fullness, vaginal bleeding, bowel or bladder complaint that her persistent for 2-4 weeks she will call us earlier than this  Other Visit Diagnoses     Malignant neoplasm of ovary, unspecified laterality ([address] )    -  Primary    Relevant Orders                CHIEF COMPLAINT:  Ovarian cancer surveillance      Problem:  Cancer Staging  Malignant neoplasm of left ovary (HCC)  Staging form: Ovary, Fallopian Tube, Primary Peritoneal, AJCC 8th Edition  - Clinical stage from 5/28/2019: FIGO Stage I (cT1, cN0, cM0) - Signed by Gris Hartley MD on 5/28/2019  - Pathologic stage from 5/28/2019: FIGO Stage IC1, calculated as Stage IC (pT1c1, pN0, cM0) - Signed by Gris Hartley MD on 5/28/2019        Previous therapy:     Malignant neoplasm of left ovary ([facility] [address] )    5/17/2019 Initial Diagnosis     Malignant neoplasm of left ovary ([facility] [address] )      5/17/2019 Surgery     Patient underwent total abdominal hysterectomy bilateral salpingo oophorectomy radical omentectomy bilateral pelvic lymphadenectomy and staging procedure for 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        5/28/2019 -  Cancer Staged     Staging form: Ovary, Fallopian Tube, Primary Peritoneal, AJCC 8th Edition  - Pathologic stage from 5/28/2019: FIGO Stage IC1, calculated as Stage IC (pT1c1, pN0, cM0) - Signed by Gris Hartley MD on 5/28/2019  Histologic grade (G): G3  Histologic grading system: 4 grade system  Residual tumor volume after primary cytoreductive surgery: No gross tumor  Stage used in treatment planning: Yes  National guidelines used in treatment planning: Yes        5/28/2019 -  Cancer Staged     Staging form: Ovary, Fallopian Tube, Primary Peritoneal, AJCC 8th Edition  - Clinical stage from 5/28/2019: FIGO Stage I (cT1, cN0, cM0) - Signed by Lucho Casanova MD on 5/28/2019  Stage used in treatment planning: Yes  National guidelines used in treatment planning: Yes         Chemotherapy     Carbo platinum area under the curve of 6 paclitaxel 175 milligrams/meter squared for 3-6 cycles  Patient refused treatment           Patient ID: Leila De La Garza is a 44 y o  female  Patient is a very pleasant 57-year-old white female with a history of ovarian cyst   She underwent robotically assisted total laparoscopic hysterectomy bilateral salpingo-oophorectomy and staging procedure for stage IC1 clear cell carcinoma of the ovary  She was recommended carboplatin paclitaxel for 3-6 cycles but refused this  She presents today in surveillance  Overall the patient is doing well she has no new complaint  Today, the patient is doing well  She denies significant abdominal pain, pelvic pain, nausea, vomiting, constipation, diarrhea, fevers, chills, or vaginal bleeding  The following portions of the patient's history were reviewed and updated as appropriate: allergies, current medications, past medical history, past social history, past surgical history and problem list     Review of Systems   Constitutional: Negative  HENT: Negative  Eyes: Negative  Respiratory: Negative  Cardiovascular: Negative  Gastrointestinal: Negative  Endocrine: Negative  Genitourinary: Negative  Musculoskeletal: Negative  Skin: Negative  Neurological: Negative  Hematological: Negative  Psychiatric/Behavioral: Negative          Current Outpatient Medications   Medication Sig Dispense Refill    albuterol (2 5 mg/3 mL) 0 083 % nebulizer solution INHALE CONTENTS OF 1 VIAL IN NEBULIZER EVERY 4 HOURS AS NEEDED  0    docusate sodium (COLACE) 100 mg capsule Take 1 capsule (100 mg total) by mouth 2 (two) times a day 20 capsule 0    patient supplied medication        No current facility-administered medications for this visit  Objective:    Blood pressure 130/84, pulse 69, temperature 97 7 °F (36 5 °C), resp  rate 18, height 5' 1" (1 549 m), weight 58 3 kg (128 lb 8 oz), last menstrual period 01/02/2019  Body mass index is 24 28 kg/m²  Body surface area is 1 57 meters squared  Physical Exam   Constitutional: She is oriented to person, place, and time  She appears well-developed and well-nourished  HENT:   Head: Normocephalic and atraumatic  Eyes: EOM are normal    Neck: Normal range of motion  Neck supple  No thyromegaly present  Cardiovascular: Normal rate, regular rhythm and normal heart sounds  Pulmonary/Chest: Effort normal and breath sounds normal    Abdominal: Soft  Bowel sounds are normal    Well healed laparoscopic incisions  Genitourinary:   Genitourinary Comments: -Normal external female genitalia, normal Bartholin's and [place]'s glands                  -Normal midline urethral meatus  No lesions notes                  -Bladder without fullness mass or tenderness                  -Vagina without lesion or discharge No significant cystocele or rectocele noted                  -Cervix surgically absent                  -Uterus surgically absent                  -Adnexae surgically absent                  - Anus without fissure of lesion     Musculoskeletal: Normal range of motion  Lymphadenopathy:     She has no cervical adenopathy  Neurological: She is alert and oriented to person, place, and time  Skin: Skin is warm and dry  Psychiatric: She has a normal mood and affect   Her behavior is normal        Lab Results   Component Value Date     4 9 09/16/2019     Lab Results   Component Value Date     10/23/2014    K 3 9 09/16/2019     09/16/2019    CO2 28 09/16/2019    ANIONGAP 9 10/23/2014    BUN 13 09/16/2019 CREATININE 0 82 09/16/2019    GLUCOSE 132 10/23/2014    GLUF 105 (H) 09/16/2019    CALCIUM 9 5 09/16/2019    AST 20 09/16/2019    ALT 43 09/16/2019    ALKPHOS 67 09/16/2019    PROT 8 3 (H) 10/23/2014    BILITOT 0 2 10/23/2014    EGFR 83 09/16/2019     Lab Results   Component Value Date    WBC 5 51 09/16/2019    HGB 13 9 09/16/2019    HCT 43 0 09/16/2019    MCV 93 09/16/2019     09/16/2019     Lab Results   Component Value Date    NEUTROABS 2 91 09/16/2019

## 2019-10-29 ENCOUNTER — OFFICE VISIT (OUTPATIENT)
Dept: FAMILY MEDICINE CLINIC | Facility: CLINIC | Age: 49
End: 2019-10-29
Payer: COMMERCIAL

## 2019-10-29 VITALS
SYSTOLIC BLOOD PRESSURE: 122 MMHG | OXYGEN SATURATION: 99 % | DIASTOLIC BLOOD PRESSURE: 78 MMHG | HEART RATE: 66 BPM | WEIGHT: 126.9 LBS | BODY MASS INDEX: 23.35 KG/M2 | HEIGHT: 62 IN

## 2019-10-29 DIAGNOSIS — R73.01 IMPAIRED FASTING GLUCOSE: Primary | ICD-10-CM

## 2019-10-29 DIAGNOSIS — Z12.39 SCREENING FOR BREAST CANCER: ICD-10-CM

## 2019-10-29 DIAGNOSIS — C56.2 MALIGNANT NEOPLASM OF LEFT OVARY (HCC): ICD-10-CM

## 2019-10-29 DIAGNOSIS — E78.5 DYSLIPIDEMIA: ICD-10-CM

## 2019-10-29 PROCEDURE — 3008F BODY MASS INDEX DOCD: CPT | Performed by: FAMILY MEDICINE

## 2019-10-29 PROCEDURE — 99213 OFFICE O/P EST LOW 20 MIN: CPT | Performed by: FAMILY MEDICINE

## 2019-10-29 NOTE — ASSESSMENT & PLAN NOTE
Patient has impaired fasting glucose  She is doing well with diet and exercise  I am going to have her get a repeat BMP and hemoglobin A1c 1 week prior to her next visit  This visit was scheduled for 3 months  I a reviewed a CMP she had done recently for her gynecologic oncologist   Her fasting blood sugar was slightly elevated at 105

## 2019-10-29 NOTE — PATIENT INSTRUCTIONS
Heart Healthy Diet   WHAT YOU NEED TO KNOW:   A heart healthy diet is an eating plan low in total fat, unhealthy fats, and sodium (salt)  A heart healthy diet helps decrease your risk for heart disease and stroke  Limit the amount of fat you eat to 25% to 35% of your total daily calories  Limit sodium to less than 2,300 mg each day  DISCHARGE INSTRUCTIONS:   Healthy fats:  Healthy fats can help improve cholesterol levels  The risk for heart disease is decreased when cholesterol levels are normal  Choose healthy fats, such as the following:  · Unsaturated fat  is found in foods such as soybean, canola, olive, corn, and safflower oils  It is also found in soft tub margarine that is made with liquid vegetable oil  · Omega-3 fat  is found in certain fish, such as salmon, tuna, and trout, and in walnuts and flaxseed  Unhealthy fats:  Unhealthy fats can cause unhealthy cholesterol levels in your blood and increase your risk of heart disease  Limit unhealthy fats, such as the following:  · Cholesterol  is found in animal foods, such as eggs and lobster, and in dairy products made from whole milk  Limit cholesterol to less than 300 milligrams (mg) each day  You may need to limit cholesterol to 200 mg each day if you have heart disease  · Saturated fat  is found in meats, such as mancini and hamburger  It is also found in chicken or turkey skin, whole milk, and butter  Limit saturated fat to less than 7% of your total daily calories  Limit saturated fat to less than 6% if you have heart disease or are at increased risk for it  · Trans fat  is found in packaged foods, such as potato chips and cookies  It is also in hard margarine, some fried foods, and shortening  Avoid trans fats as much as possible    Heart healthy foods and drinks to include:  Ask your dietitian or healthcare provider how many servings to have from each of the following food groups:  · Grains:      ¨ Whole-wheat breads, cereals, and pastas, and brown rice    ¨ Low-fat, low-sodium crackers and chips    · Vegetables:      ¨ Broccoli, green beans, green peas, and spinach    ¨ Collards, kale, and lima beans    ¨ Carrots, sweet potatoes, tomatoes, and peppers    ¨ Canned vegetables with no salt added    · Fruits:      ¨ Bananas, peaches, pears, and pineapple    ¨ Grapes, raisins, and dates    ¨ Oranges, tangerines, grapefruit, orange juice, and grapefruit juice    ¨ Apricots, mangoes, melons, and papaya    ¨ Raspberries and strawberries    ¨ Canned fruit with no added sugar    · Low-fat dairy products:      ¨ Nonfat (skim) milk, 1% milk, and low-fat almond, cashew, or soy milks fortified with calcium    ¨ Low-fat cheese, regular or frozen yogurt, and cottage cheese    · Meats and proteins , such as lean cuts of beef and pork (loin, leg, round), skinless chicken and turkey, legumes, soy products, egg whites, and nuts  Foods and drinks to limit or avoid:  Ask your dietitian or healthcare provider about these and other foods that are high in unhealthy fat, sodium, and sugar:  · Snack or packaged foods , such as frozen dinners, cookies, macaroni and cheese, and cereals with more than 300 mg of sodium per serving    · Canned or dry mixes  for cakes, soups, sauces, or gravies    · Vegetables with added sodium , such as instant potatoes, vegetables with added sauces, or regular canned vegetables    · Other foods high in sodium , such as ketchup, barbecue sauce, salad dressing, pickles, olives, soy sauce, and miso    · High-fat dairy foods  such as whole or 2% milk, cream cheese, or sour cream, and cheeses     · High-fat protein foods  such as high-fat cuts of beef (T-bone steaks, ribs), chicken or turkey with skin, and organ meats, such as liver    · Cured or smoked meats , such as hot dogs, mancini, and sausage    · Unhealthy fats and oils , such as butter, stick margarine, shortening, and cooking oils such as coconut or palm oil    · Food and drinks high in sugar , such as soft drinks (soda), sports drinks, sweetened tea, candy, cake, cookies, pies, and doughnuts  Other diet guidelines to follow:   · Eat more foods containing omega-3 fats  Eat fish high in omega-3 fats at least 2 times a week  · Limit alcohol  Too much alcohol can damage your heart and raise your blood pressure  Women should limit alcohol to 1 drink a day  Men should limit alcohol to 2 drinks a day  A drink of alcohol is 12 ounces of beer, 5 ounces of wine, or 1½ ounces of liquor  · Choose low-sodium foods  High-sodium foods can lead to high blood pressure  Add little or no salt to food you prepare  Use herbs and spices in place of salt  · Eat more fiber  to help lower cholesterol levels  Eat at least 5 servings of fruits and vegetables each day  Eat 3 ounces of whole-grain foods each day  Legumes (beans) are also a good source of fiber  Lifestyle guidelines:   · Do not smoke  Nicotine and other chemicals in cigarettes and cigars can cause lung and heart damage  Ask your healthcare provider for information if you currently smoke and need help to quit  E-cigarettes or smokeless tobacco still contain nicotine  Talk to your healthcare provider before you use these products  · Exercise regularly  to help you maintain a healthy weight and improve your blood pressure and cholesterol levels  Ask your healthcare provider about the best exercise plan for you  Do not start an exercise program without asking your healthcare provider  Follow up with your healthcare provider as directed:  Write down your questions so you remember to ask them during your visits  © 2017 [employer] Information is for End User's use only and may not be sold, redistributed or otherwise used for commercial purposes  All illustrations and images included in CareNotes® are the copyrighted property of A [employer]  or [employer]  The above information is an  only   It is not intended as medical advice for individual conditions or treatments  Talk to your doctor, nurse or pharmacist before following any medical regimen to see if it is safe and effective for you

## 2019-10-29 NOTE — ASSESSMENT & PLAN NOTE
I reviewed a  she had done recently which was normal   Patient is doing terrific    She will continue follow-up with her gynecologic oncologist

## 2019-10-29 NOTE — ASSESSMENT & PLAN NOTE
Patient will have a repeat fasting lipid panel done prior to her next visit  We will continue to try to treat her with diet and exercise

## 2019-10-29 NOTE — PROGRESS NOTES
Assessment/Plan:    Impaired fasting glucose  Patient has impaired fasting glucose  She is doing well with diet and exercise  I am going to have her get a repeat BMP and hemoglobin A1c 1 week prior to her next visit  This visit was scheduled for 3 months  I a reviewed a CMP she had done recently for her gynecologic oncologist   Her fasting blood sugar was slightly elevated at 105  Dyslipidemia  Patient will have a repeat fasting lipid panel done prior to her next visit  We will continue to try to treat her with diet and exercise  Malignant neoplasm of left ovary (HCC)  I reviewed a  she had done recently which was normal   Patient is doing terrific  She will continue follow-up with her gynecologic oncologist        Diagnoses and all orders for this visit:    Impaired fasting glucose  -     Hemoglobin A1C; Future  -     Basic metabolic panel; Future    Screening for breast cancer  -     Mammo screening bilateral w 3d & cad; Future    Dyslipidemia  -     Lipid panel; Future    Malignant neoplasm of left ovary (HCC)          Subjective:      Patient ID: Helio Stevens is a 44 y o  female  This patient is a 23-year-old white female who presents to the office today for a checkup  Patient is doing well  She is basically following a vegetarian diet  She does eat fish as well  She is exercising regularly  She feels well  She wants to try to lose another 10-15 lb  She saw her gynecologic oncologist recently and was found to have no recurrence of her cancer  She is in good spirits  Her asthma has been well controlled  The following portions of the patient's history were reviewed and updated as appropriate: allergies, current medications, past family history, past medical history, past social history, past surgical history and problem list     Review of Systems   Constitutional: Negative for activity change, appetite change, fatigue and unexpected weight change     Respiratory: Negative for cough, shortness of breath and wheezing  Gastrointestinal: Negative for abdominal distention, abdominal pain, blood in stool, constipation, diarrhea and nausea  Objective:      /78 (BP Location: Left arm, Patient Position: Sitting, Cuff Size: Adult)   Pulse 66   Ht 5' 1 5" (1 562 m)   Wt 57 6 kg (126 lb 14 4 oz)   LMP 01/02/2019 (LMP Unknown)   SpO2 99%   BMI 23 59 kg/m²          Physical Exam   Constitutional:   This patient is a pleasant 66-year-old white female who appears her stated age  She is cooperative  She is in no distress   HENT:   Head: Normocephalic and atraumatic  Right Ear: External ear normal    Left Ear: External ear normal    Mouth/Throat: Oropharynx is clear and moist  No oropharyngeal exudate  Tympanic membranes are clear   Eyes: Pupils are equal, round, and reactive to light  Conjunctivae are normal  No scleral icterus  Neck: Neck supple  No tracheal deviation present  No thyromegaly present  Cardiovascular: Normal rate, regular rhythm and normal heart sounds  Exam reveals no gallop and no friction rub  No murmur heard  Pulmonary/Chest: Effort normal and breath sounds normal  No stridor  No respiratory distress  She has no wheezes  She has no rales  Abdominal: Soft  Bowel sounds are normal  She exhibits no distension and no mass  There is no tenderness  There is no rebound and no guarding  There is a large midline scar present  Lymphadenopathy:     She has no cervical adenopathy  Psychiatric: She has a normal mood and affect  Her behavior is normal  Judgment and thought content normal    Vitals reviewed      extremities:  Without cyanosis, clubbing, or edema

## 2019-12-18 ENCOUNTER — HOSPITAL ENCOUNTER (OUTPATIENT)
Dept: MAMMOGRAPHY | Facility: HOSPITAL | Age: 49
Discharge: HOME | End: 2019-12-18
Attending: FAMILY MEDICINE
Payer: COMMERCIAL

## 2019-12-18 VITALS — HEIGHT: 62 IN | BODY MASS INDEX: 23.59 KG/M2

## 2019-12-18 DIAGNOSIS — Z12.31 ENCOUNTER FOR SCREENING MAMMOGRAM FOR MALIGNANT NEOPLASM OF BREAST: ICD-10-CM

## 2019-12-18 DIAGNOSIS — Z12.39 SCREENING FOR BREAST CANCER: ICD-10-CM

## 2019-12-18 PROCEDURE — 77063 BREAST TOMOSYNTHESIS BI: CPT

## 2019-12-18 PROCEDURE — 77067 SCR MAMMO BI INCL CAD: CPT

## 2019-12-24 ENCOUNTER — HOSPITAL ENCOUNTER (EMERGENCY)
Facility: HOSPITAL | Age: 49
Discharge: HOME | End: 2019-12-25
Attending: EMERGENCY MEDICINE
Payer: COMMERCIAL

## 2019-12-24 ENCOUNTER — APPOINTMENT (EMERGENCY)
Dept: CT IMAGING | Facility: HOSPITAL | Age: 49
End: 2019-12-24
Payer: COMMERCIAL

## 2019-12-24 VITALS
DIASTOLIC BLOOD PRESSURE: 86 MMHG | WEIGHT: 122.58 LBS | RESPIRATION RATE: 18 BRPM | OXYGEN SATURATION: 95 % | SYSTOLIC BLOOD PRESSURE: 157 MMHG | HEIGHT: 61 IN | TEMPERATURE: 98 F | BODY MASS INDEX: 23.14 KG/M2 | HEART RATE: 69 BPM

## 2019-12-24 DIAGNOSIS — K43.9 VENTRAL HERNIA WITHOUT OBSTRUCTION OR GANGRENE: Primary | ICD-10-CM

## 2019-12-24 LAB
ALBUMIN SERPL BCP-MCNC: 3.9 G/DL (ref 3.5–5)
ALP SERPL-CCNC: 52 U/L (ref 46–116)
ALT SERPL W P-5'-P-CCNC: 37 U/L (ref 12–78)
ANION GAP SERPL CALCULATED.3IONS-SCNC: 8 MMOL/L (ref 4–13)
AST SERPL W P-5'-P-CCNC: 17 U/L (ref 5–45)
BASOPHILS # BLD AUTO: 0.06 THOUSANDS/ΜL (ref 0–0.1)
BASOPHILS NFR BLD AUTO: 1 % (ref 0–1)
BILIRUB SERPL-MCNC: 0.2 MG/DL (ref 0.2–1)
BUN SERPL-MCNC: 15 MG/DL (ref 5–25)
CALCIUM SERPL-MCNC: 9.4 MG/DL (ref 8.3–10.1)
CHLORIDE SERPL-SCNC: 105 MMOL/L (ref 100–108)
CO2 SERPL-SCNC: 29 MMOL/L (ref 21–32)
CREAT SERPL-MCNC: 0.64 MG/DL (ref 0.6–1.3)
EOSINOPHIL # BLD AUTO: 0.37 THOUSAND/ΜL (ref 0–0.61)
EOSINOPHIL NFR BLD AUTO: 5 % (ref 0–6)
ERYTHROCYTE [DISTWIDTH] IN BLOOD BY AUTOMATED COUNT: 11.8 % (ref 11.6–15.1)
GFR SERPL CREATININE-BSD FRML MDRD: 104 ML/MIN/1.73SQ M
GLUCOSE SERPL-MCNC: 93 MG/DL (ref 65–140)
HCT VFR BLD AUTO: 39 % (ref 34.8–46.1)
HGB BLD-MCNC: 13.4 G/DL (ref 11.5–15.4)
IMM GRANULOCYTES # BLD AUTO: 0.02 THOUSAND/UL (ref 0–0.2)
IMM GRANULOCYTES NFR BLD AUTO: 0 % (ref 0–2)
LYMPHOCYTES # BLD AUTO: 2.54 THOUSANDS/ΜL (ref 0.6–4.47)
LYMPHOCYTES NFR BLD AUTO: 33 % (ref 14–44)
MAGNESIUM SERPL-MCNC: 2.2 MG/DL (ref 1.6–2.6)
MCH RBC QN AUTO: 31.5 PG (ref 26.8–34.3)
MCHC RBC AUTO-ENTMCNC: 34.4 G/DL (ref 31.4–37.4)
MCV RBC AUTO: 92 FL (ref 82–98)
MONOCYTES # BLD AUTO: 0.63 THOUSAND/ΜL (ref 0.17–1.22)
MONOCYTES NFR BLD AUTO: 8 % (ref 4–12)
NEUTROPHILS # BLD AUTO: 3.99 THOUSANDS/ΜL (ref 1.85–7.62)
NEUTS SEG NFR BLD AUTO: 53 % (ref 43–75)
NRBC BLD AUTO-RTO: 0 /100 WBCS
PLATELET # BLD AUTO: 231 THOUSANDS/UL (ref 149–390)
PMV BLD AUTO: 10.4 FL (ref 8.9–12.7)
POTASSIUM SERPL-SCNC: 3.7 MMOL/L (ref 3.5–5.3)
PROT SERPL-MCNC: 7.3 G/DL (ref 6.4–8.2)
RBC # BLD AUTO: 4.26 MILLION/UL (ref 3.81–5.12)
SODIUM SERPL-SCNC: 142 MMOL/L (ref 136–145)
WBC # BLD AUTO: 7.61 THOUSAND/UL (ref 4.31–10.16)

## 2019-12-24 PROCEDURE — 36415 COLL VENOUS BLD VENIPUNCTURE: CPT | Performed by: EMERGENCY MEDICINE

## 2019-12-24 PROCEDURE — 74177 CT ABD & PELVIS W/CONTRAST: CPT

## 2019-12-24 PROCEDURE — 85025 COMPLETE CBC W/AUTO DIFF WBC: CPT | Performed by: EMERGENCY MEDICINE

## 2019-12-24 PROCEDURE — 99284 EMERGENCY DEPT VISIT MOD MDM: CPT

## 2019-12-24 PROCEDURE — 99285 EMERGENCY DEPT VISIT HI MDM: CPT | Performed by: EMERGENCY MEDICINE

## 2019-12-24 PROCEDURE — 83735 ASSAY OF MAGNESIUM: CPT | Performed by: EMERGENCY MEDICINE

## 2019-12-24 PROCEDURE — 80053 COMPREHEN METABOLIC PANEL: CPT | Performed by: EMERGENCY MEDICINE

## 2019-12-24 RX ADMIN — IOHEXOL 100 ML: 350 INJECTION, SOLUTION INTRAVENOUS at 22:26

## 2019-12-25 NOTE — ED PROVIDER NOTES
History  Chief Complaint   Patient presents with    Post-op Problem     pt has hysterectomy and cyst removal in May, for the past couple of days patient state it has been "bubbling" at incision site and "feels like it is pulling apart"     Patient: Andres Howard  49 y o /female  YOB: 1970  MRN: [id number]  PCP: Katherin Kellogg DO  Date of evaluation: 12/24/2019    (N B   [address] may have been used in the preparation of this document  Occasional wrong word or "sound-alike" substitutions may have occurred due to the inherent limitations of voice recognition software  Interpretation should be guided by context )    In May she had surgery   (Procedure(s) (LRB):  TOTAL ABDOMINAL HYSTERECTOMY, BILATERAL SALPINGO-OOPHORECTOMY, PELVIC LYMPHNODE DISSECTION, RADICAL OMENTECTOMY, STAGING BIOPSIES (N/A) right ureterolysis)  A couple days ago she noticed a lump to the right side incision, near the level of the umbilicus  She notes it is worse with standing does not go away with lying down  She also feels a pulling sensation in the area which has not been there before  She has no pain  History provided by:  Patient      Prior to Admission Medications   Prescriptions Last Dose Informant Patient Reported? Taking?    albuterol (2 5 mg/3 mL) 0 083 % nebulizer solution  Self Yes No   Sig: INHALE CONTENTS OF 1 VIAL IN NEBULIZER EVERY 4 HOURS AS NEEDED   patient supplied medication  Self Yes No      Facility-Administered Medications: None       Past Medical History:   Diagnosis Date    Asthma     Ovarian cancer ([facility] [address] ) 2019    Ovarian cyst        Past Surgical History:   Procedure Laterality Date    COLONOSCOPY      OVARIAN CYST SURGERY      MT TOTAL ABDOM HYSTERECTOMY N/A 5/16/2019    Procedure: TOTAL ABDOMINAL HYSTERECTOMY, BILATERAL SALPINGO-OOPHORECTOMY, PELVIC LYMPHNODE DISSECTION, RADICAL OMENTECTOMY, STAGING BIOPSIES;  Surgeon: Jeremy Martell MD;  [facility];  Service: Gynecology Oncology    TONSILLECTOMY         Family History   Problem Relation Age of Onset    Diabetes Mother     Pancreatic cancer Mother     Diabetes Father     Cancer Father         bladder    No Known Problems Maternal Grandmother     No Known Problems Maternal Grandfather     No Known Problems Paternal Grandmother     Bone cancer Paternal Grandfather     Prostate cancer Paternal Grandfather     Brain cancer Maternal Aunt     No Known Problems Maternal Aunt      I have reviewed and agree with the history as documented  Social History     Tobacco Use    Smoking status: Never Smoker    Smokeless tobacco: Never Used   Substance Use Topics    Alcohol use: Not Currently     Alcohol/week: 0 0 standard drinks     Frequency: Never     Drinks per session: Patient refused     Binge frequency: Patient refused    Drug use: No        Review of Systems   Cardiovascular: Negative for palpitations  Gastrointestinal: Negative for abdominal pain  Abdominal wall lump, pulling sensation at incision site near umbilicus   Genitourinary: Negative for decreased urine volume and difficulty urinating  Skin: Negative for color change and rash  Physical Exam  Physical Exam   Constitutional: She is oriented to person, place, and time  She appears well-developed and well-nourished  HENT:   Mouth/Throat: Oropharynx is clear and moist and mucous membranes are normal    Voice normal   Eyes: Pupils are equal, round, and reactive to light  EOM are normal    Cardiovascular: Normal rate and regular rhythm  Pulmonary/Chest: Effort normal    Abdominal: Soft  Bowel sounds are normal  There is no tenderness  There is no rigidity, no rebound and no guarding  No erythema or ecchymosis on the abdomen  Neurological: She is alert and oriented to person, place, and time  GCS eye subscore is 4  GCS verbal subscore is 5  GCS motor subscore is 6  Skin: Skin is warm and dry     No erythema or ecchymosis on the abdomen  Psychiatric: She has a normal mood and affect  Her speech is normal and behavior is normal    Nursing note and vitals reviewed        Vital Signs  ED Triage Vitals [12/24/19 2114]   Temperature Pulse Respirations Blood Pressure SpO2   98 °F (36 7 °C) 75 16 (!) 187/96 98 %      Temp Source Heart Rate Source Patient Position - Orthostatic VS BP Location FiO2 (%)   Temporal Monitor Lying Left arm --      Pain Score       No Pain           Vitals:    12/24/19 2114 12/24/19 2300   BP: (!) 187/96 157/86   Pulse: 75 69   Patient Position - Orthostatic VS: Lying          Visual Acuity      ED Medications  Medications   iohexol (OMNIPAQUE) 350 MG/ML injection (MULTI-DOSE) 100 mL (100 mL Intravenous Given 12/24/19 2226)       Diagnostic Studies  Results Reviewed     Procedure Component Value Units Date/Time    Comprehensive metabolic panel [[id number]] Collected:  12/24/19 2154    Lab Status:  Final result Specimen:  Blood from Arm, Right Updated:  12/24/19 2216     Sodium 142 mmol/L      Potassium 3 7 mmol/L      Chloride 105 mmol/L      CO2 29 mmol/L      ANION GAP 8 mmol/L      BUN 15 mg/dL      Creatinine 0 64 mg/dL      Glucose 93 mg/dL      Calcium 9 4 mg/dL      AST 17 U/L      ALT 37 U/L      Alkaline Phosphatase 52 U/L      Total Protein 7 3 g/dL      Albumin 3 9 g/dL      Total Bilirubin 0 20 mg/dL      eGFR 104 ml/min/1 73sq m     Narrative:       [place] guidelines for Chronic Kidney Disease (CKD):     Stage 1 with normal or high GFR (GFR > 90 mL/min/1 73 square meters)    Stage 2 Mild CKD (GFR = 60-89 mL/min/1 73 square meters)    Stage 3A Moderate CKD (GFR = 45-59 mL/min/1 73 square meters)    Stage 3B Moderate CKD (GFR = 30-44 mL/min/1 73 square meters)    Stage 4 Severe CKD (GFR = 15-29 mL/min/1 73 square meters)    Stage 5 End Stage CKD (GFR <15 mL/min/1 73 square meters)  Note: GFR calculation is accurate only with a steady state creatinine    Magnesium [[id number]] (Normal) Collected:  12/24/19 2154    Lab Status:  Final result Specimen:  Blood from Arm, Right Updated:  12/24/19 2210     Magnesium 2 2 mg/dL     CBC and differential [[id number]] Collected:  12/24/19 2154    Lab Status:  Final result Specimen:  Blood from Arm, Right Updated:  12/24/19 2200     WBC 7 61 Thousand/uL      RBC 4 26 Million/uL      Hemoglobin 13 4 g/dL      Hematocrit 39 0 %      MCV 92 fL      MCH 31 5 pg      MCHC 34 4 g/dL      RDW 11 8 %      MPV 10 4 fL      Platelets 032 Thousands/uL      nRBC 0 /100 WBCs      Neutrophils Relative 53 %      Immat GRANS % 0 %      Lymphocytes Relative 33 %      Monocytes Relative 8 %      Eosinophils Relative 5 %      Basophils Relative 1 %      Neutrophils Absolute 3 99 Thousands/µL      Immature Grans Absolute 0 02 Thousand/uL      Lymphocytes Absolute 2 54 Thousands/µL      Monocytes Absolute 0 63 Thousand/µL      Eosinophils Absolute 0 37 Thousand/µL      Basophils Absolute 0 06 Thousands/µL                  CT abdomen pelvis with contrast   Final Result by Raza Garcia DO (12/24 2245)      Multiple fat-containing and nonobstructed bowel containing ventral hernias  No evidence of bowel obstruction  Indeterminate right hepatic lobe hyperdense lesion  Further evaluation with a MRI of the liver with contrast is recommended on a nonemergent basis      The study was marked in EPIC for significant notification  Workstation performed: [id number]                    Procedures  Procedures         ED Course  ED Course as of Dec 27 1509   Tue Dec 24, 2019   2232 WBC: 7 61   2232 Hemoglobin: 13 4   2232 Platelet Count: 554   3594 eGFR: 835   1861 Magnesium: 2 2   Wed Dec 25, 2019   0135 Unable to completely reduce 2 of the hernias  One feels firm  None are really tender  Alin Yeh, per [employer]  1649  connected me to Dr Gaudencio Cortez cell directly  Case reviewed    Pt appears stable for discharge and early outpatient followup  MDM  Number of Diagnoses or Management Options  Ventral hernia without obstruction or gangrene:      Amount and/or Complexity of Data Reviewed  Tests in the radiology section of CPT®: ordered and reviewed  Tests in the medicine section of CPT®: ordered and reviewed  Discuss the patient with other providers: yes    Risk of Complications, Morbidity, and/or Mortality  Presenting problems: high  Diagnostic procedures: high    Patient Progress  Patient progress: stable        Disposition  Final diagnoses:   Ventral hernia without obstruction or gangrene     Time reflects when diagnosis was documented in both MDM as applicable and the Disposition within this note     Time User Action Codes Description Comment    12/25/2019  2:44 AM Evelio Avalos All Add [K43 9] Ventral hernia without obstruction or gangrene       ED Disposition     ED Disposition Condition Date/Time Comment    Discharge Stable Wed Dec 25, 2019  2:44 AM Tim Miller discharge to home/self care  Follow-up Information     Follow up With Specialties Details Why Contact Info Additional MD [facility] Call in 2 days WITHOUT FAIL [address] [facility] [address]  [facility] [address] [facility] General Surgery   [facility] [address] [facility]  [address] [facility] Surgery [facility] General Surgery Call  Say you are following up from an ER visit   [facility] [phone]  703 [facility], [address] [place] [address], [phone]          Discharge Medication List as of 12/25/2019  2:46 AM      CONTINUE these medications which have NOT CHANGED    Details   albuterol (2 5 mg/3 mL) 0 083 % nebulizer solution INHALE CONTENTS OF 1 VIAL IN NEBULIZER EVERY 4 HOURS AS NEEDED, Historical Med      patient supplied medication Historical Med           No discharge procedures on file      ED Provider  Electronically Signed by           Emily Ingram MD  12/27/19 8209

## 2019-12-26 ENCOUNTER — OFFICE VISIT (OUTPATIENT)
Dept: SURGERY | Facility: CLINIC | Age: 49
End: 2019-12-26
Payer: COMMERCIAL

## 2019-12-26 ENCOUNTER — TELEPHONE (OUTPATIENT)
Dept: SURGERY | Facility: CLINIC | Age: 49
End: 2019-12-26

## 2019-12-26 VITALS
DIASTOLIC BLOOD PRESSURE: 84 MMHG | HEIGHT: 61 IN | WEIGHT: 121 LBS | TEMPERATURE: 98.2 F | RESPIRATION RATE: 18 BRPM | SYSTOLIC BLOOD PRESSURE: 140 MMHG | BODY MASS INDEX: 22.84 KG/M2 | HEART RATE: 72 BPM

## 2019-12-26 DIAGNOSIS — K43.2 INCISIONAL HERNIA, WITHOUT OBSTRUCTION OR GANGRENE: Primary | ICD-10-CM

## 2019-12-26 PROCEDURE — 99204 OFFICE O/P NEW MOD 45 MIN: CPT | Performed by: SURGERY

## 2019-12-26 RX ORDER — SODIUM CHLORIDE, SODIUM LACTATE, POTASSIUM CHLORIDE, CALCIUM CHLORIDE 600; 310; 30; 20 MG/100ML; MG/100ML; MG/100ML; MG/100ML
125 INJECTION, SOLUTION INTRAVENOUS CONTINUOUS
Status: CANCELLED | OUTPATIENT
Start: 2019-12-26

## 2019-12-26 RX ORDER — DIPHENOXYLATE HYDROCHLORIDE AND ATROPINE SULFATE 2.5; .025 MG/1; MG/1
1 TABLET ORAL DAILY
COMMUNITY

## 2019-12-26 RX ORDER — LEVOFLOXACIN 5 MG/ML
500 INJECTION, SOLUTION INTRAVENOUS ONCE
Status: CANCELLED | OUTPATIENT
Start: 2019-12-26 | End: 2019-12-26

## 2019-12-26 RX ORDER — MULTIVIT WITH MINERALS/LUTEIN
250 TABLET ORAL DAILY
COMMUNITY

## 2019-12-26 RX ORDER — ONDANSETRON 2 MG/ML
4 INJECTION INTRAMUSCULAR; INTRAVENOUS ONCE
Status: CANCELLED | OUTPATIENT
Start: 2019-12-26 | End: 2019-12-26

## 2019-12-26 NOTE — PROGRESS NOTES
Assessment/Plan:    Incisional hernia, without obstruction or gangrene  The patient is a pleasant 76-year-old female with a past medical history significant for stage I ovarian carcinoma status post radical hysterectomy in May 2019 presenting to the office with Swiss-cheese abdomen secondary to multiple ventral incisional hernias for which definitive treatment by laparoscopic incisional herniorrhaphy with mesh is now indicated  According to the patient she was in her usual state of health until about 3 months ago when she developed a pulling tearing sensation in her abdominal wall  After symptoms progress she presented to the [facility] emergency room where her evaluation included history and physical examination as well as the CT which confirmed the presence of multiple fascial defects in the midline  She presents to the office today for definitive treatment recommendations  On physical examination she is awake alert and oriented  She is competent reliable historian  Inspection palpation of the abdomen reveals a well-healed midline incision with several palpable fascial defects which are soft and reducible  No evidence for acute incarceration or strangulation necessitating urgent surgical intervention  The technical details of laparoscopic incisional herniorrhaphy with mesh were explained to the patient as were the risks related to anesthesia, bleeding, infection, iatrogenic small bowel injury and the need for additional surgical interventions as well as a 10% lifetime risk of recurrent hernia formation  All questions answered to the satisfaction of the patient and informed consent taken to proceed         Diagnoses and all orders for this visit:    Incisional hernia, without obstruction or gangrene  -     Case request operating room: [facility]; Standing  -     Case request operating room: [facility]    Other orders  -     multivitamin (THERAGRAN) TABS; Take 1 tablet by mouth daily  -     Cod Liver Oil (COD LIVER PO); Take by mouth  -     Probiotic Product (PROBIOTIC PO); Take by mouth  -     ascorbic acid (VITAMIN C) 250 mg tablet; Take 250 mg by mouth daily  -     Vital signs; Standing  -     Diet NPO; Sips with meds; Standing  -     lactated ringers infusion  -     ondansetron (ZOFRAN) injection 4 mg  -     Apply Sequential Compression Device; Standing  -     Place sequential compression device; Standing  -     levofloxacin (LEVAQUIN) IVPB (premix) 500 mg  -     metroNIDAZOLE (FLAGYL) IVPB (premix) 500 mg          Subjective:      Patient ID: An Hwang is a 44 y o  female  12- Patient is here today for multiple incisional hernias (seen in ER 12-)  Patient stated that she has had the incisional hernia for about 3 months, she is s/p abdominal hysterectomy, ovarian mass removal 05-  Patient stated that she is having pressure all along her midline incisional area and gets nauseated when pressure is applied to the stomach area especially when standing  Levorn Nay      The following portions of the patient's history were reviewed and updated as appropriate: allergies, current medications, past family history, past medical history, past social history, past surgical history and problem list     Review of Systems   Constitutional: Negative  HENT: Negative  Eyes: Negative  Respiratory: Negative  Cardiovascular: Negative  Gastrointestinal: Negative  Endocrine: Negative  Genitourinary: Negative  Musculoskeletal: Negative  Skin: Negative  Allergic/Immunologic: Negative  Neurological: Negative  Hematological: Negative  Psychiatric/Behavioral: Negative            Objective:      /84 (BP Location: Left arm, Patient Position: Sitting, Cuff Size: Standard)   Pulse 72   Temp 98 2 °F (36 8 °C) (Tympanic)   Resp 18   Ht 5' 1" (1 549 m)   Wt 54 9 kg (121 lb)   LMP 01/02/2019 (LMP Unknown)   BMI 22 86 kg/m²          Physical Exam   Constitutional: She is oriented to person, place, and time  She appears well-developed and well-nourished  HENT:   Head: Normocephalic and atraumatic  Eyes: Pupils are equal, round, and reactive to light  Conjunctivae are normal    Neck: Normal range of motion  Neck supple  Cardiovascular: Normal rate and regular rhythm  Pulmonary/Chest: Effort normal and breath sounds normal    Abdominal: Soft  Bowel sounds are normal    Well-healed midline incision with multiple fascial defects palpable in the midline  Musculoskeletal: Normal range of motion  Neurological: She is alert and oriented to person, place, and time  Skin: Skin is warm and dry     Psychiatric: Her behavior is normal  Judgment and thought content normal

## 2019-12-26 NOTE — TELEPHONE ENCOUNTER
Left message for patient that I faxed the script for the binder to [facility] and if for some reason it needed to be sent to another business ([address] [place] [address]) that she should call and we would fax it there

## 2019-12-26 NOTE — LETTER
December 27, 2019     Roger Davies[address]   [address]  [facility] [address]    Patient: Corinne Pander   YOB: 1970   Date of Visit: 12/26/2019       Dear Dr Nikita Maria:    Thank you for referring Burt Perkins to me for evaluation  Below are my notes for this consultation  If you have questions, please do not hesitate to call me  I look forward to following your patient along with you  Sincerely,        Gretchen Coulter MD        CC: No Recipients  Gretchen Coulter MD  12/27/2019  9:28 AM  Sign at close encounter  Assessment/Plan:    Incisional hernia, without obstruction or gangrene  The patient is a pleasant 78-year-old female with a past medical history significant for stage I ovarian carcinoma status post radical hysterectomy in May 2019 presenting to the office with Swiss-cheese abdomen secondary to multiple ventral incisional hernias for which definitive treatment by laparoscopic incisional herniorrhaphy with mesh is now indicated  According to the patient she was in her usual state of health until about 3 months ago when she developed a pulling tearing sensation in her abdominal wall  After symptoms progress she presented to the [facility]'s emergency room where her evaluation included history and physical examination as well as the CT which confirmed the presence of multiple fascial defects in the midline  She presents to the office today for definitive treatment recommendations  On physical examination she is awake alert and oriented  She is competent reliable historian  Inspection palpation of the abdomen reveals a well-healed midline incision with several palpable fascial defects which are soft and reducible  No evidence for acute incarceration or strangulation necessitating urgent surgical intervention      The technical details of laparoscopic incisional herniorrhaphy with mesh were explained to the patient as were the risks related to anesthesia, bleeding, infection, iatrogenic small bowel injury and the need for additional surgical interventions as well as a 10% lifetime risk of recurrent hernia formation  All questions answered to the satisfaction of the patient and informed consent taken to proceed  Diagnoses and all orders for this visit:    Incisional hernia, without obstruction or gangrene  -     Case request operating room: [address] [place] [address]; Standing  -     Case request operating room: [address] [place] [address]    Other orders  -     multivitamin (THERAGRAN) TABS; Take 1 tablet by mouth daily  -     Cod Liver Oil (COD LIVER PO); Take by mouth  -     Probiotic Product (PROBIOTIC PO); Take by mouth  -     ascorbic acid (VITAMIN C) 250 mg tablet; Take 250 mg by mouth daily  -     Vital signs; Standing  -     Diet NPO; Sips with meds; Standing  -     lactated ringers infusion  -     ondansetron (ZOFRAN) injection 4 mg  -     Apply Sequential Compression Device; Standing  -     Place sequential compression device; Standing  -     levofloxacin (LEVAQUIN) IVPB (premix) 500 mg  -     metroNIDAZOLE (FLAGYL) IVPB (premix) 500 mg          Subjective:      Patient ID: Davide Goode is a 44 y o  female  12- Patient is here today for multiple incisional hernias (seen in ER 12-)  Patient stated that she has had the incisional hernia for about 3 months, she is s/p abdominal hysterectomy, ovarian mass removal 05-  Patient stated that she is having pressure all along her midline incisional area and gets nauseated when pressure is applied to the stomach area especially when standing  [facility]      The following portions of the patient's history were reviewed and updated as appropriate: allergies, current medications, past family history, past medical history, past social history, past surgical history and problem list     Review of Systems   Constitutional: Negative  HENT: Negative  Eyes: Negative  Respiratory: Negative  Cardiovascular: Negative  Gastrointestinal: Negative  Endocrine: Negative  Genitourinary: Negative  Musculoskeletal: Negative  Skin: Negative  Allergic/Immunologic: Negative  Neurological: Negative  Hematological: Negative  Psychiatric/Behavioral: Negative  Objective:      /84 (BP Location: Left arm, Patient Position: Sitting, Cuff Size: Standard)   Pulse 72   Temp 98 2 °F (36 8 °C) (Tympanic)   Resp 18   Ht 5' 1" (1 549 m)   Wt 54 9 kg (121 lb)   LMP 01/02/2019 (LMP Unknown)   BMI 22 86 kg/m²           Physical Exam   Constitutional: She is oriented to person, place, and time  She appears well-developed and well-nourished  HENT:   Head: Normocephalic and atraumatic  Eyes: Pupils are equal, round, and reactive to light  Conjunctivae are normal    Neck: Normal range of motion  Neck supple  Cardiovascular: Normal rate and regular rhythm  Pulmonary/Chest: Effort normal and breath sounds normal    Abdominal: Soft  Bowel sounds are normal    Well-healed midline incision with multiple fascial defects palpable in the midline  Musculoskeletal: Normal range of motion  Neurological: She is alert and oriented to person, place, and time  Skin: Skin is warm and dry     Psychiatric: Her behavior is normal  Judgment and thought content normal

## 2019-12-27 ENCOUNTER — APPOINTMENT (OUTPATIENT)
Dept: LAB | Facility: HOSPITAL | Age: 49
End: 2019-12-27
Payer: COMMERCIAL

## 2019-12-27 DIAGNOSIS — C56.9 MALIGNANT NEOPLASM OF OVARY, UNSPECIFIED LATERALITY (HCC): ICD-10-CM

## 2019-12-27 LAB — CANCER AG125 SERPL-ACNC: 4.3 U/ML (ref 0–30)

## 2019-12-27 PROCEDURE — 36415 COLL VENOUS BLD VENIPUNCTURE: CPT

## 2019-12-27 PROCEDURE — 86304 IMMUNOASSAY TUMOR CA 125: CPT

## 2019-12-27 NOTE — ASSESSMENT & PLAN NOTE
The patient is a pleasant 20-year-old female with a past medical history significant for stage I ovarian carcinoma status post radical hysterectomy in May 2019 presenting to the office with Swiss-cheese abdomen secondary to multiple ventral incisional hernias for which definitive treatment by laparoscopic incisional herniorrhaphy with mesh is now indicated  According to the patient she was in her usual state of health until about 3 months ago when she developed a pulling tearing sensation in her abdominal wall  After symptoms progress she presented to the [facility]'s emergency room where her evaluation included history and physical examination as well as the CT which confirmed the presence of multiple fascial defects in the midline  She presents to the office today for definitive treatment recommendations  On physical examination she is awake alert and oriented  She is competent reliable historian  Inspection palpation of the abdomen reveals a well-healed midline incision with several palpable fascial defects which are soft and reducible  No evidence for acute incarceration or strangulation necessitating urgent surgical intervention  The technical details of laparoscopic incisional herniorrhaphy with mesh were explained to the patient as were the risks related to anesthesia, bleeding, infection, iatrogenic small bowel injury and the need for additional surgical interventions as well as a 10% lifetime risk of recurrent hernia formation  All questions answered to the satisfaction of the patient and informed consent taken to proceed

## 2019-12-27 NOTE — H&P
Assessment/Plan:    Incisional hernia, without obstruction or gangrene  The patient is a pleasant 13-year-old female with a past medical history significant for stage I ovarian carcinoma status post radical hysterectomy in May 2019 presenting to the office with Swiss-cheese abdomen secondary to multiple ventral incisional hernias for which definitive treatment by laparoscopic incisional herniorrhaphy with mesh is now indicated  According to the patient she was in her usual state of health until about 3 months ago when she developed a pulling tearing sensation in her abdominal wall  After symptoms progress she presented to the [facility] emergency room where her evaluation included history and physical examination as well as the CT which confirmed the presence of multiple fascial defects in the midline  She presents to the office today for definitive treatment recommendations  On physical examination she is awake alert and oriented  She is competent reliable historian  Inspection palpation of the abdomen reveals a well-healed midline incision with several palpable fascial defects which are soft and reducible  No evidence for acute incarceration or strangulation necessitating urgent surgical intervention  The technical details of laparoscopic incisional herniorrhaphy with mesh were explained to the patient as were the risks related to anesthesia, bleeding, infection, iatrogenic small bowel injury and the need for additional surgical interventions as well as a 10% lifetime risk of recurrent hernia formation  All questions answered to the satisfaction of the patient and informed consent taken to proceed         Diagnoses and all orders for this visit:    Incisional hernia, without obstruction or gangrene  -     Case request operating room: [facility]; Standing  -     Case request operating room: [facility]    Other orders  -     multivitamin (THERAGRAN) TABS; Take 1 tablet by mouth daily  -     Cod Liver Oil (COD LIVER PO); Take by mouth  -     Probiotic Product (PROBIOTIC PO); Take by mouth  -     ascorbic acid (VITAMIN C) 250 mg tablet; Take 250 mg by mouth daily  -     Vital signs; Standing  -     Diet NPO; Sips with meds; Standing  -     lactated ringers infusion  -     ondansetron (ZOFRAN) injection 4 mg  -     Apply Sequential Compression Device; Standing  -     Place sequential compression device; Standing  -     levofloxacin (LEVAQUIN) IVPB (premix) 500 mg  -     metroNIDAZOLE (FLAGYL) IVPB (premix) 500 mg          Subjective:      Patient ID: Bairon Gonzalez is a 44 y o  female  12- Patient is here today for multiple incisional hernias (seen in ER 12-)  Patient stated that she has had the incisional hernia for about 3 months, she is s/p abdominal hysterectomy, ovarian mass removal 05-  Patient stated that she is having pressure all along her midline incisional area and gets nauseated when pressure is applied to the stomach area especially when standing  Lova Cram      The following portions of the patient's history were reviewed and updated as appropriate: allergies, current medications, past family history, past medical history, past social history, past surgical history and problem list     Review of Systems   Constitutional: Negative  HENT: Negative  Eyes: Negative  Respiratory: Negative  Cardiovascular: Negative  Gastrointestinal: Negative  Endocrine: Negative  Genitourinary: Negative  Musculoskeletal: Negative  Skin: Negative  Allergic/Immunologic: Negative  Neurological: Negative  Hematological: Negative  Psychiatric/Behavioral: Negative            Objective:      /84 (BP Location: Left arm, Patient Position: Sitting, Cuff Size: Standard)   Pulse 72   Temp 98 2 °F (36 8 °C) (Tympanic)   Resp 18   Ht 5' 1" (1 549 m)   Wt 54 9 kg (121 lb)   LMP 01/02/2019 (LMP Unknown)   BMI 22 86 kg/m²           Physical Exam   Constitutional: She is oriented to person, place, and time  She appears well-developed and well-nourished  HENT:   Head: Normocephalic and atraumatic  Eyes: Pupils are equal, round, and reactive to light  Conjunctivae are normal    Neck: Normal range of motion  Neck supple  Cardiovascular: Normal rate and regular rhythm  Pulmonary/Chest: Effort normal and breath sounds normal    Abdominal: Soft  Bowel sounds are normal    Well-healed midline incision with multiple fascial defects palpable in the midline  Musculoskeletal: Normal range of motion  Neurological: She is alert and oriented to person, place, and time  Skin: Skin is warm and dry     Psychiatric: Her behavior is normal  Judgment and thought content normal

## 2019-12-27 NOTE — H&P (VIEW-ONLY)
Assessment/Plan:    Incisional hernia, without obstruction or gangrene  The patient is a pleasant 75-year-old female with a past medical history significant for stage I ovarian carcinoma status post radical hysterectomy in May 2019 presenting to the office with Swiss-cheese abdomen secondary to multiple ventral incisional hernias for which definitive treatment by laparoscopic incisional herniorrhaphy with mesh is now indicated  According to the patient she was in her usual state of health until about 3 months ago when she developed a pulling tearing sensation in her abdominal wall  After symptoms progress she presented to the [facility] emergency room where her evaluation included history and physical examination as well as the CT which confirmed the presence of multiple fascial defects in the midline  She presents to the office today for definitive treatment recommendations  On physical examination she is awake alert and oriented  She is competent reliable historian  Inspection palpation of the abdomen reveals a well-healed midline incision with several palpable fascial defects which are soft and reducible  No evidence for acute incarceration or strangulation necessitating urgent surgical intervention  The technical details of laparoscopic incisional herniorrhaphy with mesh were explained to the patient as were the risks related to anesthesia, bleeding, infection, iatrogenic small bowel injury and the need for additional surgical interventions as well as a 10% lifetime risk of recurrent hernia formation  All questions answered to the satisfaction of the patient and informed consent taken to proceed         Diagnoses and all orders for this visit:    Incisional hernia, without obstruction or gangrene  -     Case request operating room: [facility]; Standing  -     Case request operating room: [facility]    Other orders  -     multivitamin (THERAGRAN) TABS; Take 1 tablet by mouth daily  -     Cod Liver Oil (COD LIVER PO); Take by mouth  -     Probiotic Product (PROBIOTIC PO); Take by mouth  -     ascorbic acid (VITAMIN C) 250 mg tablet; Take 250 mg by mouth daily  -     Vital signs; Standing  -     Diet NPO; Sips with meds; Standing  -     lactated ringers infusion  -     ondansetron (ZOFRAN) injection 4 mg  -     Apply Sequential Compression Device; Standing  -     Place sequential compression device; Standing  -     levofloxacin (LEVAQUIN) IVPB (premix) 500 mg  -     metroNIDAZOLE (FLAGYL) IVPB (premix) 500 mg          Subjective:      Patient ID: Tim Miller is a 44 y o  female  12- Patient is here today for multiple incisional hernias (seen in ER 12-)  Patient stated that she has had the incisional hernia for about 3 months, she is s/p abdominal hysterectomy, ovarian mass removal 05-  Patient stated that she is having pressure all along her midline incisional area and gets nauseated when pressure is applied to the stomach area especially when standing  [place] Males      The following portions of the patient's history were reviewed and updated as appropriate: allergies, current medications, past family history, past medical history, past social history, past surgical history and problem list     Review of Systems   Constitutional: Negative  HENT: Negative  Eyes: Negative  Respiratory: Negative  Cardiovascular: Negative  Gastrointestinal: Negative  Endocrine: Negative  Genitourinary: Negative  Musculoskeletal: Negative  Skin: Negative  Allergic/Immunologic: Negative  Neurological: Negative  Hematological: Negative  Psychiatric/Behavioral: Negative            Objective:      /84 (BP Location: Left arm, Patient Position: Sitting, Cuff Size: Standard)   Pulse 72   Temp 98 2 °F (36 8 °C) (Tympanic)   Resp 18   Ht 5' 1" (1 549 m)   Wt 54 9 kg (121 lb)   LMP 01/02/2019 (LMP Unknown)   BMI 22 86 kg/m²           Physical Exam   Constitutional: She is oriented to person, place, and time  She appears well-developed and well-nourished  HENT:   Head: Normocephalic and atraumatic  Eyes: Pupils are equal, round, and reactive to light  Conjunctivae are normal    Neck: Normal range of motion  Neck supple  Cardiovascular: Normal rate and regular rhythm  Pulmonary/Chest: Effort normal and breath sounds normal    Abdominal: Soft  Bowel sounds are normal    Well-healed midline incision with multiple fascial defects palpable in the midline  Musculoskeletal: Normal range of motion  Neurological: She is alert and oriented to person, place, and time  Skin: Skin is warm and dry     Psychiatric: Her behavior is normal  Judgment and thought content normal

## 2019-12-31 ENCOUNTER — HOSPITAL ENCOUNTER (OUTPATIENT)
Dept: MRI IMAGING | Facility: HOSPITAL | Age: 49
Discharge: HOME | End: 2019-12-31
Payer: COMMERCIAL

## 2019-12-31 ENCOUNTER — OFFICE VISIT (OUTPATIENT)
Dept: GYNECOLOGIC ONCOLOGY | Facility: CLINIC | Age: 49
End: 2019-12-31
Payer: COMMERCIAL

## 2019-12-31 VITALS
BODY MASS INDEX: 22.84 KG/M2 | SYSTOLIC BLOOD PRESSURE: 102 MMHG | HEIGHT: 61 IN | HEART RATE: 68 BPM | WEIGHT: 121 LBS | TEMPERATURE: 98.1 F | DIASTOLIC BLOOD PRESSURE: 74 MMHG | RESPIRATION RATE: 18 BRPM

## 2019-12-31 DIAGNOSIS — C56.2 MALIGNANT NEOPLASM OF LEFT OVARY (HCC): ICD-10-CM

## 2019-12-31 DIAGNOSIS — C56.9 MALIGNANT NEOPLASM OF OVARY, UNSPECIFIED LATERALITY (HCC): Primary | ICD-10-CM

## 2019-12-31 DIAGNOSIS — K76.9 LIVER LESION: Primary | ICD-10-CM

## 2019-12-31 DIAGNOSIS — K43.2 INCISIONAL HERNIA, WITHOUT OBSTRUCTION OR GANGRENE: ICD-10-CM

## 2019-12-31 DIAGNOSIS — K76.89 HEPATIC CYST: ICD-10-CM

## 2019-12-31 DIAGNOSIS — C56.9 MALIGNANT NEOPLASM OF OVARY, UNSPECIFIED LATERALITY (HCC): ICD-10-CM

## 2019-12-31 PROBLEM — N83.8 OVARIAN MASS: Status: RESOLVED | Noted: 2019-05-10 | Resolved: 2019-12-31

## 2019-12-31 PROCEDURE — 74182 MRI ABDOMEN W/CONTRAST: CPT

## 2019-12-31 PROCEDURE — A9585 GADOBUTROL INJECTION: HCPCS | Performed by: OBSTETRICS & GYNECOLOGY

## 2019-12-31 PROCEDURE — 99214 OFFICE O/P EST MOD 30 MIN: CPT | Performed by: OBSTETRICS & GYNECOLOGY

## 2019-12-31 RX ORDER — LORAZEPAM 0.5 MG/1
TABLET ORAL
Qty: 2 TABLET | Refills: 0 | Status: ON HOLD | OUTPATIENT
Start: 2019-12-31 | End: 2020-01-21

## 2019-12-31 RX ADMIN — GADOBUTROL 5 ML: 604.72 INJECTION INTRAVENOUS at 14:56

## 2019-12-31 NOTE — ASSESSMENT & PLAN NOTE
Patient remains in a clinical remission from her ovarian cancer  Her CT scan is unremarkable with the exception of a simple cyst in the liver    Her  and exam are normal   We remain recommending follow-up in 3 months with repeat

## 2019-12-31 NOTE — ASSESSMENT & PLAN NOTE
This hepatic cyst is likely simple and not malignant  However an MRI in the face of upcoming abdominal surgery is very reasonable  We will move ahead with MRI of the abdomen to rule out malignancy within the liver

## 2019-12-31 NOTE — H&P (VIEW-ONLY)
Assessment/Plan:    Problem List Items Addressed This Visit        Digestive    Hepatic cyst     This hepatic cyst is likely simple and not malignant  However an MRI in the face of upcoming abdominal surgery is very reasonable  We will move ahead with MRI of the abdomen to rule out malignancy within the liver  Relevant Medications    LORazepam (ATIVAN) 0 5 mg tablet    Other Relevant Orders    MRI abdomen w contrast       Endocrine    Malignant neoplasm of left ovary [facility])     Patient remains in a clinical remission from her ovarian cancer  Her CT scan is unremarkable with the exception of a simple cyst in the liver  Her  and exam are normal   We remain recommending follow-up in 3 months with repeat             Other    Incisional hernia, without obstruction or gangrene     The patient's hernia is likely incisional   It is unclear why her suture failed  The patient plans to have mesh placed in the near future hopefully via laparoscopy  I think this is the most reasonable approach to this situation  Other Visit Diagnoses     Malignant neoplasm of ovary, unspecified laterality ([facility] [address] )    -  Primary    Relevant Orders        MRI abdomen w contrast            CHIEF COMPLAINT:  Follow-up ovarian cancer in new abdominal incisional hernia        Problem:  Cancer Staging  Malignant neoplasm of left ovary (HCC)  Staging form: Ovary, Fallopian Tube, Primary Peritoneal, AJCC 8th Edition  - Clinical stage from 5/28/2019: FIGO Stage I (cT1, cN0, cM0) - Signed by Sherrie Desir MD on 5/28/2019  - Pathologic stage from 5/28/2019: FIGO Stage IC1, calculated as Stage IC (pT1c1, pN0, cM0) - Signed by Sherrie Desir MD on 5/28/2019        Previous therapy:     Malignant neoplasm of left ovary ([facility] [address] )    5/17/2019 Initial Diagnosis     Malignant neoplasm of left ovary ([facility] [address] )      5/17/2019 Surgery     Patient underwent total abdominal hysterectomy bilateral salpingo oophorectomy radical omentectomy bilateral pelvic lymphadenectomy and staging procedure for 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  5/28/2019 -  Cancer Staged     Staging form: Ovary, Fallopian Tube, Primary Peritoneal, AJCC 8th Edition  - Pathologic stage from 5/28/2019: FIGO Stage IC1, calculated as Stage IC (pT1c1, pN0, cM0) - Signed by Jennifer Jones MD on 5/28/2019  Histologic grade (G): G3  Histologic grading system: 4 grade system  Residual tumor volume after primary cytoreductive surgery: No gross tumor  Stage used in treatment planning: Yes  National guidelines used in treatment planning: Yes        5/28/2019 -  Cancer Staged     Staging form: Ovary, Fallopian Tube, Primary Peritoneal, AJCC 8th Edition  - Clinical stage from 5/28/2019: FIGO Stage I (cT1, cN0, cM0) - Signed by Jennifer Jones MD on 5/28/2019  Stage used in treatment planning: Yes  National guidelines used in treatment planning: Yes         Chemotherapy     Carbo platinum area under the curve of 6 paclitaxel 175 milligrams/meter squared for 3-6 cycles  Patient refused treatment           Patient ID: Bairon Gonzalez is a 44 y o  female  Patient is a very pleasant 47-year-old female with a history of stage IC ovarian cancer status post surgical treatment  The patient refused chemotherapy  Her surgery was approximately 6 months ago  Around 3 months after her surgery the patient noted some discomfort to the right side of the incision  More recently she developed significant pain and was seen in the emergency department  A CT scan was performed revealing multiple hernias within the abdominal incision  A hepatic cyst was also noted  Other patient is scheduled for laparoscopic hernia repair in the next week or so  She has no other symptoms  Her most recent  is normal at 4         The following portions of the patient's history were reviewed and updated as appropriate: allergies, current medications, past family history, past medical history, past surgical history and problem list     Review of Systems   Constitutional: Negative  HENT: Negative  Eyes: Negative  Respiratory: Negative  Cardiovascular: Negative  Gastrointestinal: Positive for abdominal pain  Endocrine: Negative  Genitourinary: Negative  Musculoskeletal: Negative  Skin: Negative  Neurological: Negative  Hematological: Negative  Psychiatric/Behavioral: Negative  Current Outpatient Medications   Medication Sig Dispense Refill    albuterol (2 5 mg/3 mL) 0 083 % nebulizer solution INHALE CONTENTS OF 1 VIAL IN NEBULIZER EVERY 4 HOURS AS NEEDED  0    ascorbic acid (VITAMIN C) 250 mg tablet Take 250 mg by mouth daily      Cod Liver Oil (COD LIVER PO) Take by mouth      multivitamin (THERAGRAN) TABS Take 1 tablet by mouth daily      patient supplied medication       Probiotic Product (PROBIOTIC PO) Take by mouth      LORazepam (ATIVAN) 0 5 mg tablet One tablet every 8 hours as needed for anxiety and prior to MRI 2 tablet 0     No current facility-administered medications for this visit  Objective:    Blood pressure 102/74, pulse 68, temperature 98 1 °F (36 7 °C), resp  rate 18, height 5' 1" (1 549 m), weight 54 9 kg (121 lb), last menstrual period 01/02/2019  Body mass index is 22 86 kg/m²  Body surface area is 1 53 meters squared  Physical Exam   Constitutional: She is oriented to person, place, and time  She appears well-developed and well-nourished  HENT:   Head: Normocephalic and atraumatic  Eyes: EOM are normal    Neck: Normal range of motion  Neck supple  No thyromegaly present  Cardiovascular: Normal rate, regular rhythm and normal heart sounds  Pulmonary/Chest: Effort normal and breath sounds normal    Abdominal: Soft  Bowel sounds are normal    Well healed midline incision with palpable hernia of approximately 2 cm in the upper abdominal portion     Genitourinary:   Genitourinary Comments: -Normal external female genitalia, normal Bartholin's and Mondovi's glands                  -Normal midline urethral meatus  No lesions notes                  -Bladder without fullness mass or tenderness                  -Vagina without lesion or discharge No significant cystocele or rectocele noted                  -Cervix surgically absent                  -Uterus surgically absent                  -Adnexae surgically absent                  - Anus without fissure of lesion     Musculoskeletal: Normal range of motion  Lymphadenopathy:     She has no cervical adenopathy  Neurological: She is alert and oriented to person, place, and time  Skin: Skin is warm and dry  Psychiatric: She has a normal mood and affect   Her behavior is normal        Lab Results   Component Value Date     4 3 12/27/2019     Lab Results   Component Value Date     10/23/2014    K 3 7 12/24/2019     12/24/2019    CO2 29 12/24/2019    ANIONGAP 9 10/23/2014    BUN 15 12/24/2019    CREATININE 0 64 12/24/2019    GLUCOSE 132 10/23/2014    GLUF 105 (H) 09/16/2019    CALCIUM 9 4 12/24/2019    AST 17 12/24/2019    ALT 37 12/24/2019    ALKPHOS 52 12/24/2019    PROT 8 3 (H) 10/23/2014    BILITOT 0 2 10/23/2014    EGFR 104 12/24/2019     Lab Results   Component Value Date    WBC 7 61 12/24/2019    HGB 13 4 12/24/2019    HCT 39 0 12/24/2019    MCV 92 12/24/2019     12/24/2019     Lab Results   Component Value Date    NEUTROABS 3 99 12/24/2019

## 2019-12-31 NOTE — PROGRESS NOTES
Assessment/Plan:    Problem List Items Addressed This Visit        Digestive    Hepatic cyst     This hepatic cyst is likely simple and not malignant  However an MRI in the face of upcoming abdominal surgery is very reasonable  We will move ahead with MRI of the abdomen to rule out malignancy within the liver  Relevant Medications    LORazepam (ATIVAN) 0 5 mg tablet    Other Relevant Orders    MRI abdomen w contrast       Endocrine    Malignant neoplasm of left ovary [facility])     Patient remains in a clinical remission from her ovarian cancer  Her CT scan is unremarkable with the exception of a simple cyst in the liver  Her  and exam are normal   We remain recommending follow-up in 3 months with repeat             Other    Incisional hernia, without obstruction or gangrene     The patient's hernia is likely incisional   It is unclear why her suture failed  The patient plans to have mesh placed in the near future hopefully via laparoscopy  I think this is the most reasonable approach to this situation  Other Visit Diagnoses     Malignant neoplasm of ovary, unspecified laterality ([facility] [address] )    -  Primary    Relevant Orders        MRI abdomen w contrast            CHIEF COMPLAINT:  Follow-up ovarian cancer in new abdominal incisional hernia        Problem:  Cancer Staging  Malignant neoplasm of left ovary (HCC)  Staging form: Ovary, Fallopian Tube, Primary Peritoneal, AJCC 8th Edition  - Clinical stage from 5/28/2019: FIGO Stage I (cT1, cN0, cM0) - Signed by Jose Roberto Leigh MD on 5/28/2019  - Pathologic stage from 5/28/2019: FIGO Stage IC1, calculated as Stage IC (pT1c1, pN0, cM0) - Signed by Jose Roberto Leigh MD on 5/28/2019        Previous therapy:     Malignant neoplasm of left ovary ([facility] [address] )    5/17/2019 Initial Diagnosis     Malignant neoplasm of left ovary ([facility] [address] )      5/17/2019 Surgery     Patient underwent total abdominal hysterectomy bilateral salpingo oophorectomy radical omentectomy bilateral pelvic lymphadenectomy and staging procedure for 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  5/28/2019 -  Cancer Staged     Staging form: Ovary, Fallopian Tube, Primary Peritoneal, AJCC 8th Edition  - Pathologic stage from 5/28/2019: FIGO Stage IC1, calculated as Stage IC (pT1c1, pN0, cM0) - Signed by Sofia Terrell MD on 5/28/2019  Histologic grade (G): G3  Histologic grading system: 4 grade system  Residual tumor volume after primary cytoreductive surgery: No gross tumor  Stage used in treatment planning: Yes  National guidelines used in treatment planning: Yes        5/28/2019 -  Cancer Staged     Staging form: Ovary, Fallopian Tube, Primary Peritoneal, AJCC 8th Edition  - Clinical stage from 5/28/2019: FIGO Stage I (cT1, cN0, cM0) - Signed by Sofia Terrell MD on 5/28/2019  Stage used in treatment planning: Yes  National guidelines used in treatment planning: Yes         Chemotherapy     Carbo platinum area under the curve of 6 paclitaxel 175 milligrams/meter squared for 3-6 cycles  Patient refused treatment           Patient ID: Shera Frankel is a 44 y o  female  Patient is a very pleasant 58-year-old female with a history of stage IC ovarian cancer status post surgical treatment  The patient refused chemotherapy  Her surgery was approximately 6 months ago  Around 3 months after her surgery the patient noted some discomfort to the right side of the incision  More recently she developed significant pain and was seen in the emergency department  A CT scan was performed revealing multiple hernias within the abdominal incision  A hepatic cyst was also noted  Other patient is scheduled for laparoscopic hernia repair in the next week or so  She has no other symptoms  Her most recent  is normal at 4         The following portions of the patient's history were reviewed and updated as appropriate: allergies, current medications, past family history, past medical history, past surgical history and problem list     Review of Systems   Constitutional: Negative  HENT: Negative  Eyes: Negative  Respiratory: Negative  Cardiovascular: Negative  Gastrointestinal: Positive for abdominal pain  Endocrine: Negative  Genitourinary: Negative  Musculoskeletal: Negative  Skin: Negative  Neurological: Negative  Hematological: Negative  Psychiatric/Behavioral: Negative  Current Outpatient Medications   Medication Sig Dispense Refill    albuterol (2 5 mg/3 mL) 0 083 % nebulizer solution INHALE CONTENTS OF 1 VIAL IN NEBULIZER EVERY 4 HOURS AS NEEDED  0    ascorbic acid (VITAMIN C) 250 mg tablet Take 250 mg by mouth daily      Cod Liver Oil (COD LIVER PO) Take by mouth      multivitamin (THERAGRAN) TABS Take 1 tablet by mouth daily      patient supplied medication       Probiotic Product (PROBIOTIC PO) Take by mouth      LORazepam (ATIVAN) 0 5 mg tablet One tablet every 8 hours as needed for anxiety and prior to MRI 2 tablet 0     No current facility-administered medications for this visit  Objective:    Blood pressure 102/74, pulse 68, temperature 98 1 °F (36 7 °C), resp  rate 18, height 5' 1" (1 549 m), weight 54 9 kg (121 lb), last menstrual period 01/02/2019  Body mass index is 22 86 kg/m²  Body surface area is 1 53 meters squared  Physical Exam   Constitutional: She is oriented to person, place, and time  She appears well-developed and well-nourished  HENT:   Head: Normocephalic and atraumatic  Eyes: EOM are normal    Neck: Normal range of motion  Neck supple  No thyromegaly present  Cardiovascular: Normal rate, regular rhythm and normal heart sounds  Pulmonary/Chest: Effort normal and breath sounds normal    Abdominal: Soft  Bowel sounds are normal    Well healed midline incision with palpable hernia of approximately 2 cm in the upper abdominal portion     Genitourinary:   Genitourinary Comments: -Normal external female genitalia, normal Bartholin's and Datil's glands                  -Normal midline urethral meatus  No lesions notes                  -Bladder without fullness mass or tenderness                  -Vagina without lesion or discharge No significant cystocele or rectocele noted                  -Cervix surgically absent                  -Uterus surgically absent                  -Adnexae surgically absent                  - Anus without fissure of lesion     Musculoskeletal: Normal range of motion  Lymphadenopathy:     She has no cervical adenopathy  Neurological: She is alert and oriented to person, place, and time  Skin: Skin is warm and dry  Psychiatric: She has a normal mood and affect   Her behavior is normal        Lab Results   Component Value Date     4 3 12/27/2019     Lab Results   Component Value Date     10/23/2014    K 3 7 12/24/2019     12/24/2019    CO2 29 12/24/2019    ANIONGAP 9 10/23/2014    BUN 15 12/24/2019    CREATININE 0 64 12/24/2019    GLUCOSE 132 10/23/2014    GLUF 105 (H) 09/16/2019    CALCIUM 9 4 12/24/2019    AST 17 12/24/2019    ALT 37 12/24/2019    ALKPHOS 52 12/24/2019    PROT 8 3 (H) 10/23/2014    BILITOT 0 2 10/23/2014    EGFR 104 12/24/2019     Lab Results   Component Value Date    WBC 7 61 12/24/2019    HGB 13 4 12/24/2019    HCT 39 0 12/24/2019    MCV 92 12/24/2019     12/24/2019     Lab Results   Component Value Date    NEUTROABS 3 99 12/24/2019

## 2019-12-31 NOTE — ASSESSMENT & PLAN NOTE
The patient's hernia is likely incisional   It is unclear why her suture failed  The patient plans to have mesh placed in the near future hopefully via laparoscopy  I think this is the most reasonable approach to this situation

## 2020-01-06 ENCOUNTER — TELEPHONE (OUTPATIENT)
Dept: OTHER | Facility: HOSPITAL | Age: 50
End: 2020-01-06

## 2020-01-06 ENCOUNTER — HOSPITAL ENCOUNTER (OUTPATIENT)
Dept: CT IMAGING | Facility: HOSPITAL | Age: 50
Discharge: HOME | End: 2020-01-06
Payer: COMMERCIAL

## 2020-01-06 VITALS
DIASTOLIC BLOOD PRESSURE: 90 MMHG | HEIGHT: 61 IN | HEART RATE: 56 BPM | SYSTOLIC BLOOD PRESSURE: 158 MMHG | OXYGEN SATURATION: 98 % | RESPIRATION RATE: 18 BRPM | WEIGHT: 119 LBS | TEMPERATURE: 98.1 F | BODY MASS INDEX: 22.47 KG/M2

## 2020-01-06 DIAGNOSIS — K76.9 LIVER LESION: ICD-10-CM

## 2020-01-06 DIAGNOSIS — K76.9 HEPATIC LESION: Primary | ICD-10-CM

## 2020-01-06 LAB
INR PPP: 1.03 (ref 0.84–1.19)
PROTHROMBIN TIME: 13.2 SECONDS (ref 11.6–14.5)

## 2020-01-06 PROCEDURE — 74170 CT ABD WO CNTRST FLWD CNTRST: CPT

## 2020-01-06 PROCEDURE — 85610 PROTHROMBIN TIME: CPT | Performed by: RADIOLOGY

## 2020-01-06 RX ORDER — MIDAZOLAM HYDROCHLORIDE 2 MG/2ML
INJECTION, SOLUTION INTRAMUSCULAR; INTRAVENOUS CODE/TRAUMA/SEDATION MEDICATION
Status: COMPLETED | OUTPATIENT
Start: 2020-01-06 | End: 2020-01-06

## 2020-01-06 RX ORDER — ONDANSETRON 2 MG/ML
4 INJECTION INTRAMUSCULAR; INTRAVENOUS ONCE
Status: DISCONTINUED | OUTPATIENT
Start: 2020-01-06 | End: 2020-01-07 | Stop reason: HOSPADM

## 2020-01-06 RX ORDER — LEVOFLOXACIN 5 MG/ML
500 INJECTION, SOLUTION INTRAVENOUS ONCE
Status: DISCONTINUED | OUTPATIENT
Start: 2020-01-06 | End: 2020-01-07 | Stop reason: HOSPADM

## 2020-01-06 RX ORDER — FENTANYL CITRATE 50 UG/ML
INJECTION, SOLUTION INTRAMUSCULAR; INTRAVENOUS CODE/TRAUMA/SEDATION MEDICATION
Status: COMPLETED | OUTPATIENT
Start: 2020-01-06 | End: 2020-01-06

## 2020-01-06 RX ORDER — SODIUM CHLORIDE, SODIUM LACTATE, POTASSIUM CHLORIDE, CALCIUM CHLORIDE 600; 310; 30; 20 MG/100ML; MG/100ML; MG/100ML; MG/100ML
125 INJECTION, SOLUTION INTRAVENOUS CONTINUOUS
Status: DISCONTINUED | OUTPATIENT
Start: 2020-01-06 | End: 2020-01-07 | Stop reason: HOSPADM

## 2020-01-06 RX ORDER — SODIUM CHLORIDE 9 MG/ML
75 INJECTION, SOLUTION INTRAVENOUS CONTINUOUS
Status: DISCONTINUED | OUTPATIENT
Start: 2020-01-06 | End: 2020-01-07 | Stop reason: HOSPADM

## 2020-01-06 RX ADMIN — MIDAZOLAM 1 MG: 1 INJECTION INTRAMUSCULAR; INTRAVENOUS at 09:47

## 2020-01-06 RX ADMIN — IOHEXOL 100 ML: 350 INJECTION, SOLUTION INTRAVENOUS at 10:08

## 2020-01-06 RX ADMIN — FENTANYL CITRATE 50 MCG: 50 INJECTION, SOLUTION INTRAMUSCULAR; INTRAVENOUS at 09:47

## 2020-01-06 NOTE — BRIEF OP NOTE (RAD/CATH)
Attempted liver lesion biopsy procedure note    PATIENT NAME: Rita Moore  : 1970  MRN: [id number]     Pre-op Diagnosis:   1  Liver lesion      Post-op Diagnosis:   1  Liver lesion        Surgeon:   Capo Desai DO  Assistants:     No qualified resident was available  Estimated Blood Loss:  None  Findings:   · Faint segment 7 lesion identified via ultrasound  Lesion is very deep/posterior making anteromedial approach high risk with crossing of central liver vasculature  Anterolateral approach was too steep with definitive crossing of the pleura and diaphragm  · Patient transferred to CT  Lesion not identifiable via unenhanced CT  IV contrast given, arterial phase scan identified avidly hyperenhancing segment 7 lesion with immediate washout  Lesion could not be definitively identified on venous phase imaging  Given inability to localize lesion on CT, biopsy not performed  Specimens:  None  Complications:  None      Anesthesia: Conscious sedation    Capo Desai DO     Date: 2020  Time: 10:32 AM

## 2020-01-06 NOTE — TELEMEDICINE
The patient was scheduled for a image guided biopsy of the newly diagnosed hepatic lesion  She went for the biopsy  I spoke with Dr Cardenas Linear  The lesion was difficult to get access to due to its position within the liver itself  The biopsy would either have to go through the pleura and lung which the patient did not consent to or would have to go through the iftikhar hepatis which would likely cause significant complications  The biopsy was therefore not performed  Dr Vivi james recommended a consult with the hepatic surgeon for either resection or biopsy intraoperatively  I spoke to Dr Tressia Pallas by text who agreed that it was wiser to proceed with the hepatic evaluation before moving ahead with a hernia repair that may involve mesh  The patient is scheduled for her surgery for her hernia repair tomorrow with Dr Mary Barrios  I spoke with Dr Eleazar Lenz as who agreed that the surgery should be at least postponed at this time given that the patient is otherwise stable  I called the patient and related these discussions  She was comfortable proceeding with a hepatic evaluation by Dr Gypsy Erickson and postponing her hernia repair at this time  We will set up a consult with Dr Cas Rea decide as soon as possible to further evaluate her liver

## 2020-01-06 NOTE — INTERVAL H&P NOTE
H&P reviewed  After examining the patient, I find no changed to the H&P since it had been written  BP (!) 182/89   Pulse 70   Temp 97 5 °F (36 4 °C) (Oral)   Resp 18   Ht 5' 1" (1 549 m)   Wt 54 kg (119 lb)   LMP 01/02/2019 (LMP Unknown)   SpO2 98%   BMI 22 48 kg/m²     Plan for segment 7 liver lesion biopsy as characterized on MRI / CT  Patient re-evaluated   Accept as history and physical     Jessica Borden, DO/January 6, 2020/9:05 AM

## 2020-01-09 PROBLEM — R16.0 LIVER MASS: Status: ACTIVE | Noted: 2020-01-09

## 2020-01-10 ENCOUNTER — CONSULT (OUTPATIENT)
Dept: SURGICAL ONCOLOGY | Facility: CLINIC | Age: 50
End: 2020-01-10
Payer: COMMERCIAL

## 2020-01-10 VITALS
WEIGHT: 118 LBS | DIASTOLIC BLOOD PRESSURE: 72 MMHG | BODY MASS INDEX: 22.28 KG/M2 | TEMPERATURE: 97.7 F | HEIGHT: 61 IN | HEART RATE: 87 BPM | RESPIRATION RATE: 16 BRPM | SYSTOLIC BLOOD PRESSURE: 126 MMHG

## 2020-01-10 DIAGNOSIS — R16.0 LIVER MASS: Primary | ICD-10-CM

## 2020-01-10 PROCEDURE — 99244 OFF/OP CNSLTJ NEW/EST MOD 40: CPT | Performed by: SURGERY

## 2020-01-10 NOTE — PROGRESS NOTES
Surgical Oncology Consult       [address] [facility] SURGICAL ONCOLOGY [place]  [address]  [place] [address]    Andres Howard  1970  [id number]  [facility] [place]  [address] [place] [address]    Diagnoses and all orders for this visit:    Liver mass  -     MRI abdomen w wo contrast; Future  -     ; Future        Chief Complaint   Patient presents with    Consult     Liver lesion - Pt had treatment for ovarian cancer in 2019  She denies long-term HRT, reports only a short period of BCP use in college  She reports her mother had pancreatic cancer at age 62  Return in about 2 weeks (around 1/24/2020) for Office Visit, Imaging - See orders  Malignant neoplasm of left ovary (HCC)    5/17/2019 Initial Diagnosis     Malignant neoplasm of left ovary ([address] )      5/17/2019 Surgery     Patient underwent total abdominal hysterectomy bilateral salpingo oophorectomy radical omentectomy bilateral pelvic lymphadenectomy and staging procedure for 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  5/28/2019 -  Cancer Staged     Staging form: Ovary, Fallopian Tube, Primary Peritoneal, AJCC 8th Edition  - Pathologic stage from 5/28/2019: FIGO Stage IC1, calculated as Stage IC (pT1c1, pN0, cM0) - Signed by Jeremy Martell MD on 5/28/2019  Histologic grade (G): G3  Histologic grading system: 4 grade system  Residual tumor volume after primary cytoreductive surgery: No gross tumor  Stage used in treatment planning: Yes  National guidelines used in treatment planning: Yes        5/28/2019 -  Cancer Staged     Staging form: Ovary, Fallopian Tube, Primary Peritoneal, AJCC 8th Edition  - Clinical stage from 5/28/2019: FIGO Stage I (cT1, cN0, cM0) - Signed by Jeremy Martell MD on 5/28/2019  Stage used in treatment planning: Yes  National guidelines used in treatment planning:  Yes Chemotherapy     Carbo platinum area under the curve of 6 paclitaxel 175 milligrams/meter squared for 3-6 cycles  Patient refused treatment         History of Present Illness:  75-year-old female with a history of stage IC ovarian carcinoma status post surgical resection  She refused chemotherapy  She had follow-up imaging on December 24, 2019 and this revealed an indeterminate right hepatic lobe lesion  Follow-up MRI from December 31, 2019 revealed a 1 6 cm hepatic lobe lesion in segment 7 that was suspicious  CT on January 6, 2020 revealed 2 lesions that measured 1 8 cm in segment 7 and is subcentimeter lesion in segment 6  Biopsy could not be performed  She comes in now to discuss further therapy  Her CA-125 is 4 3  She is feeling well  No unintentional weight loss  Review of Systems  Complete ROS Surg Onc:   Constitutional: The patient denies new or recent history of general fatigue, no recent weight loss, no change in appetite  Eyes: No complaints of visual problems, no scleral icterus  ENT: no complaints of ear pain, no hoarseness, no difficulty swallowing,  no tinnitus and no new masses in head, oral cavity, or neck  Cardiovascular: No complaints of chest pain, no palpitations, no ankle edema  Respiratory: No complaints of shortness of breath, no cough  Gastrointestinal: No complaints of jaundice, no bloody stools, no pale stools  Genitourinary: No complaints of dysuria, no hematuria, no nocturia, no frequent urination, no urethral discharge  Musculoskeletal: No complaints of weakness, paralysis, joint stiffness or arthralgias  Integumentary: No complaints of rash, no new lesions  Neurological: No complaints of convulsions, no seizures, no dizziness  Hematologic/Lymphatic: No complaints of easy bruising  Endocrine:  No hot or cold intolerance  No polydipsia, polyphagia, or polyuria  Allergy/immunology:  No environmental allergies  No food allergies    Not immunocompromised  Skin:  No pallor or rash  No wound            Patient Active Problem List   Diagnosis    Malignant neoplasm of left ovary ([facility] )    Encounter for care related to vascular access port    Dyslipidemia    Impaired fasting glucose    Screening for breast cancer    Incisional hernia, without obstruction or gangrene    Hepatic cyst    Liver mass     Past Medical History:   Diagnosis Date    Asthma     [facility]    Ovarian cyst      Past Surgical History:   Procedure Laterality Date    COLONOSCOPY      OVARIAN CYST SURGERY      KS TOTAL ABDOM HYSTERECTOMY N/A 5/16/2019    Procedure: TOTAL ABDOMINAL HYSTERECTOMY, BILATERAL SALPINGO-OOPHORECTOMY, PELVIC LYMPHNODE DISSECTION, RADICAL OMENTECTOMY, STAGING BIOPSIES;  Surgeon: Linnette Frazier MD;  [facility]: [facility];  Service: Gynecology Oncology    TONSILLECTOMY       Family History   Problem Relation Age of Onset    Diabetes Mother     Pancreatic cancer Mother 62    Diabetes Father     Cancer Father         bladder    No Known Problems Maternal Grandmother     No Known Problems Maternal Grandfather     No Known Problems Paternal Grandmother     Bone cancer Paternal Grandfather     Prostate cancer Paternal Grandfather     Brain cancer Maternal Aunt     No Known Problems Maternal Aunt      Social History     Socioeconomic History    Marital status: /Civil Union     Spouse name: Not on file    Number of children: Not on file    Years of education: Not on file    Highest education level: Not on file   Occupational History    Not on file   Social Needs    Financial resource strain: Not on file    Food insecurity:     Worry: Not on file     Inability: Not on file    Transportation needs:     Medical: Not on file     Non-medical: Not on file   Tobacco Use    Smoking status: Never Smoker    Smokeless tobacco: Never Used   Substance and Sexual Activity    Alcohol use: Not Currently     Alcohol/week: 0 0 standard drinks     Frequency: Never     Drinks per session: Patient refused     Binge frequency: Patient refused    Drug use: No    Sexual activity: Yes     Partners: Male     Comment:    Lifestyle    Physical activity:     Days per week: Not on file     Minutes per session: Not on file    Stress: Not on file   Relationships    Social connections:     Talks on phone: Not on file     Gets together: Not on file     Attends Rastafarian service: Not on file     Active member of club or organization: Not on file     Attends meetings of clubs or organizations: Not on file     Relationship status: Not on file    Intimate partner violence:     Fear of current or ex partner: Not on file     Emotionally abused: Not on file     Physically abused: Not on file     Forced sexual activity: Not on file   Other Topics Concern    Not on file   Social History Narrative    Not on file       Current Outpatient Medications:     albuterol (2 5 mg/3 mL) 0 083 % nebulizer solution, INHALE CONTENTS OF 1 VIAL IN NEBULIZER EVERY 4 HOURS AS NEEDED, Disp: , Rfl: 0    ascorbic acid (VITAMIN C) 250 mg tablet, Take 250 mg by mouth daily, Disp: , Rfl:     Cod Liver Oil (COD LIVER PO), Take by mouth, Disp: , Rfl:     LORazepam (ATIVAN) 0 5 mg tablet, One tablet every 8 hours as needed for anxiety and prior to MRI, Disp: 2 tablet, Rfl: 0    multivitamin (THERAGRAN) TABS, Take 1 tablet by mouth daily, Disp: , Rfl:     OLIVE LEAF PO, Take 300 mg by mouth daily, Disp: , Rfl:     patient supplied medication, , Disp: , Rfl:     Probiotic Product (PROBIOTIC PO), Take by mouth, Disp: , Rfl:     TURMERIC PO, Take by mouth 2 (two) times a day, Disp: , Rfl:   Allergies   Allergen Reactions    Penicillins      Childhood reaction     Vitals:    01/10/20 1518   BP: 126/72   Pulse: 87   Resp: 16   Temp: 97 7 °F (36 5 °C)       Physical Exam   Constitutional: General appearance:  The Patient is well-developed and well-nourished who appears the stated age in no acute distress  Patient is pleasant and talkative  HEENT:  Normocephalic  Sclerae are anicteric  Mucous membranes are moist  Neck is supple without adenopathy  No JVD  Chest: The lungs are clear to auscultation  Cardiac: Heart is regular rate  Abdomen: Abdomen is soft, non-tender, non-distended and without masses  There is an incisional hernia  Extremities: There is no clubbing or cyanosis  There is no edema  Symmetric  Neuro: Grossly nonfocal  Gait is normal      Lymphatic: No evidence of cervical adenopathy bilaterally  No evidence of axillary adenopathy bilaterally  No evidence of inguinal adenopathy bilaterally  Skin: Warm, anicteric  Psych:  Patient is pleasant and talkative  Breasts:      Pathology:  [unfilled]    Labs:      Imaging  Ct Abdomen W Wo Contrast    Result Date: 1/6/2020  Narrative: CT - ABDOMEN WITHOUT AND WITH IV CONTRAST INDICATION: Evaluation of liver lesions  History of ovarian cancer  COMPARISON: MRI 12/31/2019  CT 12/24/2019 and 5/14/2019 TECHNIQUE: CT examination of the abdomen was performed  Images were obtained in the arterial and portal venous phase  Reformatted images were created in axial, sagittal, and coronal planes  Radiation dose length product (DLP) for this visit:  617 mGy-cm   This examination, like all CT scans performed in the [facility], was performed utilizing techniques to minimize radiation dose exposure, including the use of iterative reconstruction and automated exposure control  IV Contrast:  100 mL of iohexol (OMNIPAQUE) Enteric Contrast:  Enteric contrast was not administered  FINDINGS: ABDOMEN LOWER CHEST:  No clinically significant abnormality identified in the visualized lower chest  LIVER/BILIARY TREE: Redemonstration of a 1 8 x 1 5 cm lesion within segment 7 (series 4, image 31) with avid arterial phase hyperenhancement    The lesion becomes iso to slightly hypoattenuating relative to liver parenchyma in the portal venous phase  A similar 0 6 cm lesion is seen within segment 6 (series 4, image 43)  No biliary ductal dilation  Patent hepatic vasculature  GALLBLADDER:  No calcified gallstones  No pericholecystic inflammatory change  SPLEEN:  Unremarkable  PANCREAS:  Unremarkable  ADRENAL GLANDS:  Unremarkable  KIDNEYS/URETERS:  Unremarkable  No hydronephrosis  VISUALIZED STOMACH AND BOWEL:  Unremarkable  VISUALIZED ABDOMINAL CAVITY:  No pathologically enlarged periportal, mesenteric or upper retroperitoneal lymph nodes  No ascites or free intraperitoneal air  No fluid collection  VISUALIZED BODY WALL:  Incisional changes within the anterior abdominal wall  VISUALIZED ABDOMINAL VESSELS: No aneurysm  OSSEOUS STRUCTURES:  No acute fracture or destructive osseous lesion  Impression: Two liver lesions measuring 1 8 cm segment 7 and subcentimeter in segment 6  Based on combined CT and MRI characteristics, these could be FNHs with slightly atypical appearance though metastatic disease is not entirely excluded  Recommend further evaluation with MRI with Eovist  I personally discussed this study with Alessandra Johnson on 1/6/2020 at 1:38 PM  Workstation performed: [id number]     Mri Abdomen W Contrast    Result Date: 12/31/2019  Narrative: MRI - ABDOMEN - WITH AND WITHOUT CONTRAST INDICATION:  Right liver lesion found incidentally on CT  Additional history of ovarian cancer status post ARABELLA/BSO and pelvic lymphadenectomy yielding stage I C1 clear-cell adenocarcinoma of the right ovary, currently in clinical remission with normal   COMPARISON: CT 12/24/2019   TECHNIQUE:  The following pulse sequences were obtained prior to and following the administration of intravenous contrast:     Coronal and axial T2 with TE of 90 and 180 respectively, axial T2 with fat saturation, axial FIESTA fat-sat, axial T1-weighted in-and-out-of phase, axial DWI/ADC, precontrast axial T1 with fat saturation, post-contrast dynamic axial T1 with fat saturation at 20, 70, and 180 seconds, coronal T1  with fat saturation and 7 minute delayed axial T1 with fat saturation  IV Contrast:  5 mL of Gadobutrol injection (SINGLE-DOSE) FINDINGS: LOWER CHEST:   Unremarkable  LIVER: Normal in size and configuration  Left hepatic lobe 1 0 cm cyst (series 5, image 7)  Segment 7 lesion measuring 1 6 x 1 2 cm (series 5, image 12) with the following imaging characteristics: Mild T1 hypointensity and slight T2 hyperintensity relative to liver parenchyma, no restricted diffusion, no intralesional lipid, mild hyperenhancement on arterial and portal venous phase imaging and iso-enhancement on 7 minute delayed phase imaging  On 12/24/2019 CT, the lesion is hyperenhancing in the portal venous phase (series 2, image 15) and hypoenhancing in the delayed phase (series 4, image 11)  The lesion is new since 5/14/2019 CT  The hepatic veins and portal veins are patent  BILE DUCTS: No intrahepatic or extrahepatic bile duct dilation  GALLBLADDER:  Normal  PANCREAS: Normal  No main pancreatic ductal dilation  ADRENAL GLANDS:  Normal  SPLEEN:  Normal  KIDNEYS/PROXIMAL URETERS: No hydroureteronephrosis  No suspicious renal mass  BOWEL:   No dilated loops of bowel  PERITONEUM/RETROPERITONEUM: No ascites  LYMPH NODES: No abdominal lymphadenopathy  VASCULAR STRUCTURES:  No aneurysm  ABDOMINAL WALL: Anterior abdominal wall incisional changes  OSSEOUS STRUCTURES:  No suspicious osseous lesion  Impression: 1 6 cm lesion within hepatic segment 7 with imaging characteristics suspicious for metastatic disease as detailed above  No other findings of metastatic disease in the abdomen or pelvis  I personally discussed this study with Nicolas Hernandez on 12/31/2019 at 4:47 PM  Workstation performed: [id number]     Ct Abdomen Pelvis With Contrast    Result Date: 12/24/2019  Narrative: CT ABDOMEN AND PELVIS WITH IV CONTRAST INDICATION:   Hernia, complicated   COMPARISON:  May 14, 2019 TECHNIQUE:  CT examination of the abdomen and pelvis was performed  Axial, sagittal, and coronal 2D reformatted images were created from the source data and submitted for interpretation  Radiation dose length product (DLP) for this visit:  599 51 mGy-cm   This examination, like all CT scans performed in the [facility], was performed utilizing techniques to minimize radiation dose exposure, including the use of iterative  reconstruction and automated exposure control  IV Contrast:  100 mL of iohexol (OMNIPAQUE) Enteric Contrast:  Enteric contrast was not administered  FINDINGS: ABDOMEN LOWER CHEST:  No clinically significant abnormality identified in the visualized lower chest  LIVER/BILIARY TREE:  There is a hypodense lesion in the right hepatic lobe (series 2 image 15) measuring 1 8 cm  Tiny hypodensity in the dome of the liver likely representing a cyst  GALLBLADDER:  No calcified gallstones  No pericholecystic inflammatory change  SPLEEN:  Unremarkable  PANCREAS:  Unremarkable  ADRENAL GLANDS:  Unremarkable  KIDNEYS/URETERS:  Unremarkable  No hydronephrosis  STOMACH AND BOWEL:  Large amount fecal material within the colon  APPENDIX:  No findings to suggest appendicitis  ABDOMINOPELVIC CAVITY:  No ascites or free intraperitoneal air  No lymphadenopathy  VESSELS:  Unremarkable for patient's age  PELVIS REPRODUCTIVE ORGANS:  Patient is status post hysterectomy  URINARY BLADDER:  Unremarkable  ABDOMINAL WALL/INGUINAL REGIONS:  Multiple fat-containing anterior abdominal wall hernias are seen  Nonobstructed bowel containing ventral hernias are seen  OSSEOUS STRUCTURES:  No acute fracture or destructive osseous lesion  Impression: Multiple fat-containing and nonobstructed bowel containing ventral hernias  No evidence of bowel obstruction  Indeterminate right hepatic lobe hyperdense lesion    Further evaluation with a MRI of the liver with contrast is recommended on a nonemergent basis The study was marked in EPIC for significant notification  Workstation performed: [id number]     Mammo Screening Bilateral W 3d & Cad    Result Date: 12/19/2019  Narrative: DIAGNOSIS: Screening for breast cancer; Encounter for screening mammogram for malignant neoplasm of breast TECHNIQUE: Digital screening mammography was performed  Computer Aided Detection (CAD) analyzed all applicable images  COMPARISONS: Prior breast imaging dated: 01/11/2016, 01/11/2016, 06/26/2015, 06/26/2015, 10/02/2014, and 12/09/2009 RELEVANT HISTORY: Family Breast Cancer History: No known family history of breast cancer  Family Medical History: No known relevant family medical history  Personal History: No known relevant hormone history  No known relevant surgical history  Medical history includes ovarian cancer  The patient is scheduled in a reminder system for screening mammography  8-10% of cancers will be missed on mammography  Management of a palpable abnormality must be based on clinical grounds  Patients will be notified of their results via letter from our facility  Accredited by [employer] of Radiology and FDA  RISK ASSESSMENT: 5 Year Tyrer-Cuzick: 1 4 % 10 Year Tyrer-Cuzick: 2 89 % Lifetime Tyrer-Cuzick: 10 91 % TISSUE DENSITY: The breasts are heterogeneously dense, which may obscure small masses  INDICATION: Arline Rodriguez is a 44 y o  female presenting for screening mammography  FINDINGS: There are no suspicious masses, grouped microcalcifications or areas of architectural distortion  The skin and nipple areolar complex are unremarkable  Impression: No mammographic evidence of malignancy  ASSESSMENT/BI-RADS CATEGORY: Left: 1 - Negative Right: 1 - Negative Overall: 1 - Negative RECOMMENDATION:      - Routine screening mammogram in 1 year for both breasts  Workstation ID: [id number]     I reviewed the above laboratory and imaging data  Discussion/Summary:  47-year-old female with stage IC ovarian carcinoma    Her CA-125 is normal   On my review of the liver imaging, I suspect these are benign lesions  I have discussed this with Radiology and these may be atypical [address]  MRI with Eovist contrast was recommended  I will order this  She also wishes trend her CA-125  I will order this as well  I suspect these are completely benign findings  Hopefully the MRI with Sonja Enmanuel will confirm that these are benign  I will see her back once we have those results  She is agreeable to this  All her questions were answered

## 2020-01-10 NOTE — LETTER
January 10, 2020     Katherin Kellogg DO  [address] [place] [address]   [address]  [facility] [address]    Patient: Andres Howard   YOB: 1970   Date of Visit: 1/10/2020       Dear Dr Mayra Pierce:    Thank you for referring Craig De La Vega to me for evaluation  Below are my notes for this consultation  If you have questions, please do not hesitate to call me  I look forward to following your patient along with you  Sincerely,        Lul Sanchez MD        CC: MD Lul Shen MD  1/10/2020  3:53 PM  Sign at close encounter               Surgical Oncology Consult       [address] [facility]  [address] [facility] [address] [place] [address]  1970  [id number]  [address] [facility] SURGICAL ONCOLOGY [place]  [address] [facility] [address]    Diagnoses and all orders for this visit:    Liver mass  -     MRI abdomen w wo contrast; Future  -     ; Future        Chief Complaint   Patient presents with    Consult     Liver lesion - Pt had treatment for ovarian cancer in 2019  She denies long-term HRT, reports only a short period of BCP use in college  She reports her mother had pancreatic cancer at age 62  Return in about 2 weeks (around 1/24/2020) for Office Visit, Imaging - See orders  Malignant neoplasm of left ovary (HCC)    5/17/2019 Initial Diagnosis     Malignant neoplasm of left ovary ([address] )      5/17/2019 Surgery     Patient underwent total abdominal hysterectomy bilateral salpingo oophorectomy radical omentectomy bilateral pelvic lymphadenectomy and staging procedure for 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        5/28/2019 -  Cancer Staged     Staging form: Ovary, Fallopian Tube, Primary Peritoneal, AJCC 8th Edition  - Pathologic stage from 5/28/2019: FIGO Stage IC1, calculated as Stage IC (pT1c1, pN0, cM0) - Signed by Jeremy Martell MD on 5/28/2019  Histologic grade (G): G3  Histologic grading system: 4 grade system  Residual tumor volume after primary cytoreductive surgery: No gross tumor  Stage used in treatment planning: Yes  National guidelines used in treatment planning: Yes        5/28/2019 -  Cancer Staged     Staging form: Ovary, Fallopian Tube, Primary Peritoneal, AJCC 8th Edition  - Clinical stage from 5/28/2019: FIGO Stage I (cT1, cN0, cM0) - Signed by Brooklyn Patel MD on 5/28/2019  Stage used in treatment planning: Yes  National guidelines used in treatment planning: Yes         Chemotherapy     Carbo platinum area under the curve of 6 paclitaxel 175 milligrams/meter squared for 3-6 cycles  Patient refused treatment         History of Present Illness:  68-year-old female with a history of stage IC ovarian carcinoma status post surgical resection  She refused chemotherapy  She had follow-up imaging on December 24, 2019 and this revealed an indeterminate right hepatic lobe lesion  Follow-up MRI from December 31, 2019 revealed a 1 6 cm hepatic lobe lesion in segment 7 that was suspicious  CT on January 6, 2020 revealed 2 lesions that measured 1 8 cm in segment 7 and is subcentimeter lesion in segment 6  Biopsy could not be performed  She comes in now to discuss further therapy  Her CA-125 is 4 3  She is feeling well  No unintentional weight loss  Review of Systems  Complete ROS Surg Onc:   Constitutional: The patient denies new or recent history of general fatigue, no recent weight loss, no change in appetite  Eyes: No complaints of visual problems, no scleral icterus  ENT: no complaints of ear pain, no hoarseness, no difficulty swallowing,  no tinnitus and no new masses in head, oral cavity, or neck  Cardiovascular: No complaints of chest pain, no palpitations, no ankle edema  Respiratory: No complaints of shortness of breath, no cough  Gastrointestinal: No complaints of jaundice, no bloody stools, no pale stools     Genitourinary: No complaints of dysuria, no hematuria, no nocturia, no frequent urination, no urethral discharge  Musculoskeletal: No complaints of weakness, paralysis, joint stiffness or arthralgias  Integumentary: No complaints of rash, no new lesions  Neurological: No complaints of convulsions, no seizures, no dizziness  Hematologic/Lymphatic: No complaints of easy bruising  Endocrine:  No hot or cold intolerance  No polydipsia, polyphagia, or polyuria  Allergy/immunology:  No environmental allergies  No food allergies  Not immunocompromised  Skin:  No pallor or rash  No wound            Patient Active Problem List   Diagnosis    Malignant neoplasm of left ovary ([facility] )    Encounter for care related to vascular access port    Dyslipidemia    Impaired fasting glucose    Screening for breast cancer    Incisional hernia, without obstruction or gangrene    Hepatic cyst    Liver mass     Past Medical History:   Diagnosis Date    Asthma     Ovarian cancer ([facility] ) 2019    Ovarian cyst      Past Surgical History:   Procedure Laterality Date    COLONOSCOPY      OVARIAN CYST SURGERY      [place] TOTAL ABDOM HYSTERECTOMY N/A 5/16/2019    Procedure: TOTAL ABDOMINAL HYSTERECTOMY, BILATERAL SALPINGO-OOPHORECTOMY, PELVIC LYMPHNODE DISSECTION, RADICAL OMENTECTOMY, STAGING BIOPSIES;  Surgeon: Kely Nguyen MD;  [facility];  Service: Gynecology Oncology    TONSILLECTOMY       Family History   Problem Relation Age of Onset    Diabetes Mother     Pancreatic cancer Mother 62    Diabetes Father     Cancer Father         bladder    No Known Problems Maternal Grandmother     No Known Problems Maternal Grandfather     No Known Problems Paternal Grandmother     Bone cancer Paternal Grandfather     Prostate cancer Paternal Grandfather     Brain cancer Maternal Aunt     No Known Problems Maternal Aunt      Social History     Socioeconomic History    Marital status: /Civil Union     Spouse name: Not on file  Number of children: Not on file    Years of education: Not on file    Highest education level: Not on file   Occupational History    Not on file   Social Needs    Financial resource strain: Not on file    Food insecurity:     Worry: Not on file     Inability: Not on file    Transportation needs:     Medical: Not on file     Non-medical: Not on file   Tobacco Use    Smoking status: Never Smoker    Smokeless tobacco: Never Used   Substance and Sexual Activity    Alcohol use: Not Currently     Alcohol/week: 0 0 standard drinks     Frequency: Never     Drinks per session: Patient refused     Binge frequency: Patient refused    Drug use: No    Sexual activity: Yes     Partners: Male     Comment:    Lifestyle    Physical activity:     Days per week: Not on file     Minutes per session: Not on file    Stress: Not on file   Relationships    Social connections:     Talks on phone: Not on file     Gets together: Not on file     Attends Scientologist service: Not on file     Active member of club or organization: Not on file     Attends meetings of clubs or organizations: Not on file     Relationship status: Not on file    Intimate partner violence:     Fear of current or ex partner: Not on file     Emotionally abused: Not on file     Physically abused: Not on file     Forced sexual activity: Not on file   Other Topics Concern    Not on file   Social History Narrative    Not on file       Current Outpatient Medications:     albuterol (2 5 mg/3 mL) 0 083 % nebulizer solution, INHALE CONTENTS OF 1 VIAL IN NEBULIZER EVERY 4 HOURS AS NEEDED, Disp: , Rfl: 0    ascorbic acid (VITAMIN C) 250 mg tablet, Take 250 mg by mouth daily, Disp: , Rfl:     Cod Liver Oil (COD LIVER PO), Take by mouth, Disp: , Rfl:     LORazepam (ATIVAN) 0 5 mg tablet, One tablet every 8 hours as needed for anxiety and prior to MRI, Disp: 2 tablet, Rfl: 0    multivitamin (THERAGRAN) TABS, Take 1 tablet by mouth daily, Disp: , Rfl:   OLIVE LEAF PO, Take 300 mg by mouth daily, Disp: , Rfl:     patient supplied medication, , Disp: , Rfl:     Probiotic Product (PROBIOTIC PO), Take by mouth, Disp: , Rfl:     TURMERIC PO, Take by mouth 2 (two) times a day, Disp: , Rfl:   Allergies   Allergen Reactions    Penicillins      Childhood reaction     Vitals:    01/10/20 1518   BP: 126/72   Pulse: 87   Resp: 16   Temp: 97 7 °F (36 5 °C)       Physical Exam   Constitutional: General appearance: The Patient is well-developed and well-nourished who appears the stated age in no acute distress  Patient is pleasant and talkative  HEENT:  Normocephalic  Sclerae are anicteric  Mucous membranes are moist  Neck is supple without adenopathy  No JVD  Chest: The lungs are clear to auscultation  Cardiac: Heart is regular rate  Abdomen: Abdomen is soft, non-tender, non-distended and without masses  There is an incisional hernia  Extremities: There is no clubbing or cyanosis  There is no edema  Symmetric  Neuro: Grossly nonfocal  Gait is normal      Lymphatic: No evidence of cervical adenopathy bilaterally  No evidence of axillary adenopathy bilaterally  No evidence of inguinal adenopathy bilaterally  Skin: Warm, anicteric  Psych:  Patient is pleasant and talkative  Breasts:      Pathology:  [unfilled]    Labs:      Imaging  Ct Abdomen W Wo Contrast    Result Date: 1/6/2020  Narrative: CT - ABDOMEN WITHOUT AND WITH IV CONTRAST INDICATION: Evaluation of liver lesions  History of ovarian cancer  COMPARISON: MRI 12/31/2019  CT 12/24/2019 and 5/14/2019 TECHNIQUE: CT examination of the abdomen was performed  Images were obtained in the arterial and portal venous phase  Reformatted images were created in axial, sagittal, and coronal planes  Radiation dose length product (DLP) for this visit:  617 mGy-cm     This examination, like all CT scans performed in the [facility], was performed utilizing techniques to minimize radiation dose exposure, including the use of iterative reconstruction and automated exposure control  IV Contrast:  100 mL of iohexol (OMNIPAQUE) Enteric Contrast:  Enteric contrast was not administered  FINDINGS: ABDOMEN LOWER CHEST:  No clinically significant abnormality identified in the visualized lower chest  LIVER/BILIARY TREE: Redemonstration of a 1 8 x 1 5 cm lesion within segment 7 (series 4, image 31) with avid arterial phase hyperenhancement  The lesion becomes iso to slightly hypoattenuating relative to liver parenchyma in the portal venous phase  A similar 0 6 cm lesion is seen within segment 6 (series 4, image 43)  No biliary ductal dilation  Patent hepatic vasculature  GALLBLADDER:  No calcified gallstones  No pericholecystic inflammatory change  SPLEEN:  Unremarkable  PANCREAS:  Unremarkable  ADRENAL GLANDS:  Unremarkable  KIDNEYS/URETERS:  Unremarkable  No hydronephrosis  VISUALIZED STOMACH AND BOWEL:  Unremarkable  VISUALIZED ABDOMINAL CAVITY:  No pathologically enlarged periportal, mesenteric or upper retroperitoneal lymph nodes  No ascites or free intraperitoneal air  No fluid collection  VISUALIZED BODY WALL:  Incisional changes within the anterior abdominal wall  VISUALIZED ABDOMINAL VESSELS: No aneurysm  OSSEOUS STRUCTURES:  No acute fracture or destructive osseous lesion  Impression: Two liver lesions measuring 1 8 cm segment 7 and subcentimeter in segment 6  Based on combined CT and MRI characteristics, these could be FNHs with slightly atypical appearance though metastatic disease is not entirely excluded  Recommend further evaluation with MRI with Eovist  I personally discussed this study with Andrew Parada on 1/6/2020 at 1:38 PM  Workstation performed: [id number]     Mri Abdomen W Contrast    Result Date: 12/31/2019  Narrative: MRI - ABDOMEN - WITH AND WITHOUT CONTRAST INDICATION:  Right liver lesion found incidentally on CT    Additional history of ovarian cancer status post ARABELLA/BSO and pelvic lymphadenectomy yielding stage I C1 clear-cell adenocarcinoma of the right ovary, currently in clinical remission with normal   COMPARISON: CT 12/24/2019  TECHNIQUE:  The following pulse sequences were obtained prior to and following the administration of intravenous contrast:     Coronal and axial T2 with TE of 90 and 180 respectively, axial T2 with fat saturation, axial FIESTA fat-sat, axial T1-weighted in-and-out-of phase, axial DWI/ADC, precontrast axial T1 with fat saturation, post-contrast dynamic axial T1 with fat saturation at 20, 70, and 180 seconds, coronal T1  with fat saturation and 7 minute delayed axial T1 with fat saturation  IV Contrast:  5 mL of Gadobutrol injection (SINGLE-DOSE) FINDINGS: LOWER CHEST:   Unremarkable  LIVER: Normal in size and configuration  Left hepatic lobe 1 0 cm cyst (series 5, image 7)  Segment 7 lesion measuring 1 6 x 1 2 cm (series 5, image 12) with the following imaging characteristics: Mild T1 hypointensity and slight T2 hyperintensity relative to liver parenchyma, no restricted diffusion, no intralesional lipid, mild hyperenhancement on arterial and portal venous phase imaging and iso-enhancement on 7 minute delayed phase imaging  On 12/24/2019 CT, the lesion is hyperenhancing in the portal venous phase (series 2, image 15) and hypoenhancing in the delayed phase (series 4, image 11)  The lesion is new since 5/14/2019 CT  The hepatic veins and portal veins are patent  BILE DUCTS: No intrahepatic or extrahepatic bile duct dilation  GALLBLADDER:  Normal  PANCREAS: Normal  No main pancreatic ductal dilation  ADRENAL GLANDS:  Normal  SPLEEN:  Normal  KIDNEYS/PROXIMAL URETERS: No hydroureteronephrosis  No suspicious renal mass  BOWEL:   No dilated loops of bowel  PERITONEUM/RETROPERITONEUM: No ascites  LYMPH NODES: No abdominal lymphadenopathy  VASCULAR STRUCTURES:  No aneurysm  ABDOMINAL WALL: Anterior abdominal wall incisional changes   OSSEOUS STRUCTURES:  No suspicious osseous lesion  Impression: 1 6 cm lesion within hepatic segment 7 with imaging characteristics suspicious for metastatic disease as detailed above  No other findings of metastatic disease in the abdomen or pelvis  I personally discussed this study with Riki Holbrook on 12/31/2019 at 4:47 PM  Workstation performed: [id number]     Ct Abdomen Pelvis With Contrast    Result Date: 12/24/2019  Narrative: CT ABDOMEN AND PELVIS WITH IV CONTRAST INDICATION:   Hernia, complicated  COMPARISON:  May 14, 2019 TECHNIQUE:  CT examination of the abdomen and pelvis was performed  Axial, sagittal, and coronal 2D reformatted images were created from the source data and submitted for interpretation  Radiation dose length product (DLP) for this visit:  599 51 mGy-cm   This examination, like all CT scans performed in the [facility], was performed utilizing techniques to minimize radiation dose exposure, including the use of iterative  reconstruction and automated exposure control  IV Contrast:  100 mL of iohexol (OMNIPAQUE) Enteric Contrast:  Enteric contrast was not administered  FINDINGS: ABDOMEN LOWER CHEST:  No clinically significant abnormality identified in the visualized lower chest  LIVER/BILIARY TREE:  There is a hypodense lesion in the right hepatic lobe (series 2 image 15) measuring 1 8 cm  Tiny hypodensity in the dome of the liver likely representing a cyst  GALLBLADDER:  No calcified gallstones  No pericholecystic inflammatory change  SPLEEN:  Unremarkable  PANCREAS:  Unremarkable  ADRENAL GLANDS:  Unremarkable  KIDNEYS/URETERS:  Unremarkable  No hydronephrosis  STOMACH AND BOWEL:  Large amount fecal material within the colon  APPENDIX:  No findings to suggest appendicitis  ABDOMINOPELVIC CAVITY:  No ascites or free intraperitoneal air  No lymphadenopathy  VESSELS:  Unremarkable for patient's age  PELVIS REPRODUCTIVE ORGANS:  Patient is status post hysterectomy   URINARY BLADDER:  Unremarkable  ABDOMINAL WALL/INGUINAL REGIONS:  Multiple fat-containing anterior abdominal wall hernias are seen  Nonobstructed bowel containing ventral hernias are seen  OSSEOUS STRUCTURES:  No acute fracture or destructive osseous lesion  Impression: Multiple fat-containing and nonobstructed bowel containing ventral hernias  No evidence of bowel obstruction  Indeterminate right hepatic lobe hyperdense lesion  Further evaluation with a MRI of the liver with contrast is recommended on a nonemergent basis The study was marked in EPIC for significant notification  Workstation performed: [id number]     Mammo Screening Bilateral W 3d & Cad    Result Date: 12/19/2019  Narrative: DIAGNOSIS: Screening for breast cancer; Encounter for screening mammogram for malignant neoplasm of breast TECHNIQUE: Digital screening mammography was performed  Computer Aided Detection (CAD) analyzed all applicable images  COMPARISONS: Prior breast imaging dated: 01/11/2016, 01/11/2016, 06/26/2015, 06/26/2015, 10/02/2014, and 12/09/2009 RELEVANT HISTORY: Family Breast Cancer History: No known family history of breast cancer  Family Medical History: No known relevant family medical history  Personal History: No known relevant hormone history  No known relevant surgical history  Medical history includes ovarian cancer  The patient is scheduled in a reminder system for screening mammography  8-10% of cancers will be missed on mammography  Management of a palpable abnormality must be based on clinical grounds  Patients will be notified of their results via letter from our facility  Accredited by [employer] of Radiology and FDA  RISK ASSESSMENT: 5 Year Tyrer-Cuzick: 1 4 % 10 Year Tyrer-Cuzick: 2 89 % Lifetime Tyrer-Cuzick: 10 91 % TISSUE DENSITY: The breasts are heterogeneously dense, which may obscure small masses  INDICATION: Keturah Fabian is a 44 y o  female presenting for screening mammography   FINDINGS: There are no suspicious masses, grouped microcalcifications or areas of architectural distortion  The skin and nipple areolar complex are unremarkable  Impression: No mammographic evidence of malignancy  ASSESSMENT/BI-RADS CATEGORY: Left: 1 - Negative Right: 1 - Negative Overall: 1 - Negative RECOMMENDATION:      - Routine screening mammogram in 1 year for both breasts  Workstation ID: [id number]     I reviewed the above laboratory and imaging data  Discussion/Summary:  59-year-old female with stage IC ovarian carcinoma  Her CA-125 is normal   On my review of the liver imaging, I suspect these are benign lesions  I have discussed this with Radiology and these may be atypical [address]  MRI with Eovist contrast was recommended  I will order this  She also wishes trend her CA-125  I will order this as well  I suspect these are completely benign findings  Hopefully the MRI with Margene Number will confirm that these are benign  I will see her back once we have those results  She is agreeable to this  All her questions were answered

## 2020-01-15 ENCOUNTER — TELEPHONE (OUTPATIENT)
Dept: SURGERY | Facility: CLINIC | Age: 50
End: 2020-01-15

## 2020-01-15 ENCOUNTER — TELEPHONE (OUTPATIENT)
Dept: SURGICAL ONCOLOGY | Facility: CLINIC | Age: 50
End: 2020-01-15

## 2020-01-15 DIAGNOSIS — R16.0 LIVER MASS: Primary | ICD-10-CM

## 2020-01-15 NOTE — TELEPHONE ENCOUNTER
Left message for the patient that we are going to add her on for 01- to have her [facility] Hernia Repair with [facility]  I stated that if she had any question to please call the office

## 2020-01-15 NOTE — TELEPHONE ENCOUNTER
I discussed with the patient that her insurance company would not approve her MRI with Eovist despite my calls  I suspect we are dealing with a benign process given her imaging findings as well as normal CEA -125  We will plan on repeating the MRI with Eovist in 3 months  She is agreeable to this plan  All of her questions were answered

## 2020-01-16 RX ORDER — LEVOFLOXACIN 5 MG/ML
500 INJECTION, SOLUTION INTRAVENOUS ONCE
Status: CANCELLED | OUTPATIENT
Start: 2020-01-21 | End: 2020-01-16

## 2020-01-16 RX ORDER — SODIUM CHLORIDE, SODIUM LACTATE, POTASSIUM CHLORIDE, CALCIUM CHLORIDE 600; 310; 30; 20 MG/100ML; MG/100ML; MG/100ML; MG/100ML
125 INJECTION, SOLUTION INTRAVENOUS CONTINUOUS
Status: CANCELLED | OUTPATIENT
Start: 2020-01-16

## 2020-01-16 RX ORDER — ONDANSETRON 2 MG/ML
4 INJECTION INTRAMUSCULAR; INTRAVENOUS ONCE
Status: CANCELLED | OUTPATIENT
Start: 2020-01-16 | End: 2020-01-16

## 2020-01-20 ENCOUNTER — ANESTHESIA EVENT (OUTPATIENT)
Dept: PERIOP | Facility: HOSPITAL | Age: 50
End: 2020-01-20
Payer: COMMERCIAL

## 2020-01-21 ENCOUNTER — ANESTHESIA (OUTPATIENT)
Dept: PERIOP | Facility: HOSPITAL | Age: 50
End: 2020-01-21
Payer: COMMERCIAL

## 2020-01-21 ENCOUNTER — HOSPITAL ENCOUNTER (OUTPATIENT)
Facility: HOSPITAL | Age: 50
Discharge: HOME | End: 2020-01-23
Attending: SURGERY | Admitting: SURGERY
Payer: COMMERCIAL

## 2020-01-21 DIAGNOSIS — K43.2 INCISIONAL HERNIA, WITHOUT OBSTRUCTION OR GANGRENE: Primary | ICD-10-CM

## 2020-01-21 PROCEDURE — 94664 DEMO&/EVAL PT USE INHALER: CPT

## 2020-01-21 PROCEDURE — 49654 PR LAP, INCISIONAL HERNIA REPAIR,REDUCIBLE: CPT | Performed by: SURGERY

## 2020-01-21 PROCEDURE — C1781 MESH (IMPLANTABLE): HCPCS | Performed by: SURGERY

## 2020-01-21 PROCEDURE — 49654 PR LAP, INCISIONAL HERNIA REPAIR,REDUCIBLE: CPT | Performed by: PHYSICIAN ASSISTANT

## 2020-01-21 PROCEDURE — 94760 N-INVAS EAR/PLS OXIMETRY 1: CPT

## 2020-01-21 DEVICE — VENTRALIGHT ST MESH WITH ECHO PS POSITIONING SYSTEM
Type: IMPLANTABLE DEVICE | Site: ABDOMEN | Status: FUNCTIONAL
Brand: VENTRALIGHT ST MESH WITH ECHO PS POSITIONING SYSTEM

## 2020-01-21 DEVICE — FIXATION SECURESTRAP 5 MM ABSORB DISP: Type: IMPLANTABLE DEVICE | Site: ABDOMEN | Status: FUNCTIONAL

## 2020-01-21 RX ORDER — OXYCODONE HYDROCHLORIDE AND ACETAMINOPHEN 5; 325 MG/1; MG/1
1 TABLET ORAL EVERY 4 HOURS PRN
Status: DISCONTINUED | OUTPATIENT
Start: 2020-01-21 | End: 2020-01-21

## 2020-01-21 RX ORDER — SODIUM CHLORIDE, SODIUM LACTATE, POTASSIUM CHLORIDE, CALCIUM CHLORIDE 600; 310; 30; 20 MG/100ML; MG/100ML; MG/100ML; MG/100ML
125 INJECTION, SOLUTION INTRAVENOUS CONTINUOUS
Status: ACTIVE | OUTPATIENT
Start: 2020-01-21 | End: 2020-01-21

## 2020-01-21 RX ORDER — HEPARIN SODIUM 5000 [USP'U]/ML
5000 INJECTION, SOLUTION INTRAVENOUS; SUBCUTANEOUS EVERY 8 HOURS SCHEDULED
Status: DISCONTINUED | OUTPATIENT
Start: 2020-01-21 | End: 2020-01-23 | Stop reason: HOSPADM

## 2020-01-21 RX ORDER — SODIUM CHLORIDE, SODIUM LACTATE, POTASSIUM CHLORIDE, CALCIUM CHLORIDE 600; 310; 30; 20 MG/100ML; MG/100ML; MG/100ML; MG/100ML
100 INJECTION, SOLUTION INTRAVENOUS CONTINUOUS
Status: DISCONTINUED | OUTPATIENT
Start: 2020-01-21 | End: 2020-01-22

## 2020-01-21 RX ORDER — SODIUM CHLORIDE, SODIUM LACTATE, POTASSIUM CHLORIDE, CALCIUM CHLORIDE 600; 310; 30; 20 MG/100ML; MG/100ML; MG/100ML; MG/100ML
75 INJECTION, SOLUTION INTRAVENOUS CONTINUOUS
Status: DISCONTINUED | OUTPATIENT
Start: 2020-01-21 | End: 2020-01-21

## 2020-01-21 RX ORDER — ONDANSETRON 2 MG/ML
4 INJECTION INTRAMUSCULAR; INTRAVENOUS ONCE
Status: COMPLETED | OUTPATIENT
Start: 2020-01-21 | End: 2020-01-21

## 2020-01-21 RX ORDER — HYDROMORPHONE HCL/PF 1 MG/ML
0.5 SYRINGE (ML) INJECTION
Status: DISCONTINUED | OUTPATIENT
Start: 2020-01-21 | End: 2020-01-23 | Stop reason: HOSPADM

## 2020-01-21 RX ORDER — LEVALBUTEROL INHALATION SOLUTION 0.63 MG/3ML
0.63 SOLUTION RESPIRATORY (INHALATION) EVERY 8 HOURS PRN
Status: DISCONTINUED | OUTPATIENT
Start: 2020-01-21 | End: 2020-01-21

## 2020-01-21 RX ORDER — FENTANYL CITRATE/PF 50 MCG/ML
25 SYRINGE (ML) INJECTION
Status: DISCONTINUED | OUTPATIENT
Start: 2020-01-21 | End: 2020-01-21 | Stop reason: HOSPADM

## 2020-01-21 RX ORDER — LEVOFLOXACIN 5 MG/ML
500 INJECTION, SOLUTION INTRAVENOUS ONCE
Status: COMPLETED | OUTPATIENT
Start: 2020-01-21 | End: 2020-01-21

## 2020-01-21 RX ORDER — ONDANSETRON 2 MG/ML
4 INJECTION INTRAMUSCULAR; INTRAVENOUS EVERY 6 HOURS PRN
Status: DISCONTINUED | OUTPATIENT
Start: 2020-01-21 | End: 2020-01-23 | Stop reason: HOSPADM

## 2020-01-21 RX ORDER — KETOROLAC TROMETHAMINE 30 MG/ML
INJECTION, SOLUTION INTRAMUSCULAR; INTRAVENOUS AS NEEDED
Status: DISCONTINUED | OUTPATIENT
Start: 2020-01-21 | End: 2020-01-21 | Stop reason: SURG

## 2020-01-21 RX ORDER — ONDANSETRON 2 MG/ML
4 INJECTION INTRAMUSCULAR; INTRAVENOUS ONCE AS NEEDED
Status: DISCONTINUED | OUTPATIENT
Start: 2020-01-21 | End: 2020-01-21 | Stop reason: HOSPADM

## 2020-01-21 RX ORDER — BACLOFEN 10 MG/1
10 TABLET ORAL ONCE
Status: COMPLETED | OUTPATIENT
Start: 2020-01-21 | End: 2020-01-21

## 2020-01-21 RX ORDER — SODIUM CHLORIDE, SODIUM LACTATE, POTASSIUM CHLORIDE, CALCIUM CHLORIDE 600; 310; 30; 20 MG/100ML; MG/100ML; MG/100ML; MG/100ML
INJECTION, SOLUTION INTRAVENOUS CONTINUOUS PRN
Status: DISCONTINUED | OUTPATIENT
Start: 2020-01-21 | End: 2020-01-21

## 2020-01-21 RX ORDER — HYDROMORPHONE HCL/PF 1 MG/ML
0.5 SYRINGE (ML) INJECTION
Status: DISCONTINUED | OUTPATIENT
Start: 2020-01-21 | End: 2020-01-21

## 2020-01-21 RX ORDER — EPHEDRINE SULFATE 50 MG/ML
INJECTION INTRAVENOUS AS NEEDED
Status: DISCONTINUED | OUTPATIENT
Start: 2020-01-21 | End: 2020-01-21 | Stop reason: SURG

## 2020-01-21 RX ORDER — OXYCODONE HYDROCHLORIDE AND ACETAMINOPHEN 5; 325 MG/1; MG/1
2 TABLET ORAL EVERY 6 HOURS PRN
Status: DISCONTINUED | OUTPATIENT
Start: 2020-01-21 | End: 2020-01-23 | Stop reason: HOSPADM

## 2020-01-21 RX ORDER — KETOROLAC TROMETHAMINE 30 MG/ML
15 INJECTION, SOLUTION INTRAMUSCULAR; INTRAVENOUS EVERY 6 HOURS SCHEDULED
Status: DISPENSED | OUTPATIENT
Start: 2020-01-21 | End: 2020-01-23

## 2020-01-21 RX ORDER — SODIUM CHLORIDE, SODIUM LACTATE, POTASSIUM CHLORIDE, CALCIUM CHLORIDE 600; 310; 30; 20 MG/100ML; MG/100ML; MG/100ML; MG/100ML
125 INJECTION, SOLUTION INTRAVENOUS CONTINUOUS
Status: DISCONTINUED | OUTPATIENT
Start: 2020-01-21 | End: 2020-01-21

## 2020-01-21 RX ORDER — LIDOCAINE HYDROCHLORIDE 10 MG/ML
INJECTION, SOLUTION EPIDURAL; INFILTRATION; INTRACAUDAL; PERINEURAL AS NEEDED
Status: DISCONTINUED | OUTPATIENT
Start: 2020-01-21 | End: 2020-01-21 | Stop reason: SURG

## 2020-01-21 RX ORDER — FENTANYL CITRATE 50 UG/ML
INJECTION, SOLUTION INTRAMUSCULAR; INTRAVENOUS AS NEEDED
Status: DISCONTINUED | OUTPATIENT
Start: 2020-01-21 | End: 2020-01-21 | Stop reason: SURG

## 2020-01-21 RX ORDER — OXYCODONE HYDROCHLORIDE AND ACETAMINOPHEN 5; 325 MG/1; MG/1
1 TABLET ORAL EVERY 6 HOURS PRN
Qty: 15 TABLET | Refills: 0 | Status: SHIPPED | OUTPATIENT
Start: 2020-01-21 | End: 2020-01-23 | Stop reason: SDUPTHER

## 2020-01-21 RX ORDER — BUPIVACAINE HYDROCHLORIDE 5 MG/ML
INJECTION, SOLUTION PERINEURAL AS NEEDED
Status: DISCONTINUED | OUTPATIENT
Start: 2020-01-21 | End: 2020-01-21 | Stop reason: HOSPADM

## 2020-01-21 RX ORDER — ONDANSETRON 2 MG/ML
INJECTION INTRAMUSCULAR; INTRAVENOUS AS NEEDED
Status: DISCONTINUED | OUTPATIENT
Start: 2020-01-21 | End: 2020-01-21 | Stop reason: SURG

## 2020-01-21 RX ORDER — METHOCARBAMOL 500 MG/1
500 TABLET, FILM COATED ORAL EVERY 6 HOURS PRN
Status: DISCONTINUED | OUTPATIENT
Start: 2020-01-21 | End: 2020-01-23 | Stop reason: HOSPADM

## 2020-01-21 RX ORDER — ALBUTEROL SULFATE 90 UG/1
2 AEROSOL, METERED RESPIRATORY (INHALATION) EVERY 4 HOURS PRN
Status: DISCONTINUED | OUTPATIENT
Start: 2020-01-21 | End: 2020-01-23 | Stop reason: HOSPADM

## 2020-01-21 RX ORDER — OXYCODONE HYDROCHLORIDE AND ACETAMINOPHEN 5; 325 MG/1; MG/1
1 TABLET ORAL EVERY 6 HOURS PRN
Status: DISCONTINUED | OUTPATIENT
Start: 2020-01-21 | End: 2020-01-23 | Stop reason: HOSPADM

## 2020-01-21 RX ORDER — ROCURONIUM BROMIDE 10 MG/ML
INJECTION, SOLUTION INTRAVENOUS AS NEEDED
Status: DISCONTINUED | OUTPATIENT
Start: 2020-01-21 | End: 2020-01-21 | Stop reason: SURG

## 2020-01-21 RX ORDER — NEOSTIGMINE METHYLSULFATE 1 MG/ML
INJECTION INTRAVENOUS AS NEEDED
Status: DISCONTINUED | OUTPATIENT
Start: 2020-01-21 | End: 2020-01-21 | Stop reason: SURG

## 2020-01-21 RX ORDER — MIDAZOLAM HYDROCHLORIDE 2 MG/2ML
INJECTION, SOLUTION INTRAMUSCULAR; INTRAVENOUS AS NEEDED
Status: DISCONTINUED | OUTPATIENT
Start: 2020-01-21 | End: 2020-01-21 | Stop reason: SURG

## 2020-01-21 RX ORDER — DEXAMETHASONE SODIUM PHOSPHATE 10 MG/ML
INJECTION, SOLUTION INTRAMUSCULAR; INTRAVENOUS AS NEEDED
Status: DISCONTINUED | OUTPATIENT
Start: 2020-01-21 | End: 2020-01-21 | Stop reason: SURG

## 2020-01-21 RX ORDER — ACETAMINOPHEN 325 MG/1
650 TABLET ORAL EVERY 6 HOURS PRN
Status: DISCONTINUED | OUTPATIENT
Start: 2020-01-21 | End: 2020-01-23 | Stop reason: HOSPADM

## 2020-01-21 RX ORDER — OXYCODONE HYDROCHLORIDE AND ACETAMINOPHEN 5; 325 MG/1; MG/1
2 TABLET ORAL EVERY 4 HOURS PRN
Status: DISCONTINUED | OUTPATIENT
Start: 2020-01-21 | End: 2020-01-21

## 2020-01-21 RX ORDER — MAGNESIUM HYDROXIDE 1200 MG/15ML
LIQUID ORAL AS NEEDED
Status: DISCONTINUED | OUTPATIENT
Start: 2020-01-21 | End: 2020-01-21 | Stop reason: HOSPADM

## 2020-01-21 RX ORDER — GLYCOPYRROLATE 0.2 MG/ML
INJECTION INTRAMUSCULAR; INTRAVENOUS AS NEEDED
Status: DISCONTINUED | OUTPATIENT
Start: 2020-01-21 | End: 2020-01-21 | Stop reason: SURG

## 2020-01-21 RX ORDER — PROPOFOL 10 MG/ML
INJECTION, EMULSION INTRAVENOUS AS NEEDED
Status: DISCONTINUED | OUTPATIENT
Start: 2020-01-21 | End: 2020-01-21 | Stop reason: SURG

## 2020-01-21 RX ADMIN — KETOROLAC TROMETHAMINE 30 MG: 30 INJECTION, SOLUTION INTRAMUSCULAR; INTRAVENOUS at 10:41

## 2020-01-21 RX ADMIN — FENTANYL CITRATE 50 MCG: 50 INJECTION INTRAMUSCULAR; INTRAVENOUS at 09:27

## 2020-01-21 RX ADMIN — KETOROLAC TROMETHAMINE 15 MG: 30 INJECTION, SOLUTION INTRAMUSCULAR; INTRAVENOUS at 23:35

## 2020-01-21 RX ADMIN — ROCURONIUM BROMIDE 10 MG: 10 INJECTION INTRAVENOUS at 10:28

## 2020-01-21 RX ADMIN — NEOSTIGMINE METHYLSULFATE 3 MG: 1 INJECTION, SOLUTION INTRAVENOUS at 10:47

## 2020-01-21 RX ADMIN — DEXAMETHASONE SODIUM PHOSPHATE 10 MG: 10 INJECTION, SOLUTION INTRAMUSCULAR; INTRAVENOUS at 09:46

## 2020-01-21 RX ADMIN — LEVOFLOXACIN: 5 INJECTION, SOLUTION INTRAVENOUS at 09:22

## 2020-01-21 RX ADMIN — HYDROMORPHONE HYDROCHLORIDE 0.5 MG: 1 INJECTION, SOLUTION INTRAMUSCULAR; INTRAVENOUS; SUBCUTANEOUS at 20:46

## 2020-01-21 RX ADMIN — METHOCARBAMOL 500 MG: 500 TABLET, FILM COATED ORAL at 22:06

## 2020-01-21 RX ADMIN — BACLOFEN 10 MG: 10 TABLET ORAL at 11:51

## 2020-01-21 RX ADMIN — METHOCARBAMOL 500 MG: 500 TABLET, FILM COATED ORAL at 15:50

## 2020-01-21 RX ADMIN — FENTANYL CITRATE 25 MCG: 50 INJECTION INTRAMUSCULAR; INTRAVENOUS at 11:09

## 2020-01-21 RX ADMIN — FENTANYL CITRATE 25 MCG: 50 INJECTION INTRAMUSCULAR; INTRAVENOUS at 11:32

## 2020-01-21 RX ADMIN — SODIUM CHLORIDE, POTASSIUM CHLORIDE, SODIUM LACTATE AND CALCIUM CHLORIDE 125 ML/HR: 600; 310; 30; 20 INJECTION, SOLUTION INTRAVENOUS at 08:18

## 2020-01-21 RX ADMIN — ROCURONIUM BROMIDE 40 MG: 10 INJECTION INTRAVENOUS at 09:28

## 2020-01-21 RX ADMIN — EPHEDRINE SULFATE 10 MG: 50 INJECTION, SOLUTION INTRAVENOUS at 09:48

## 2020-01-21 RX ADMIN — HYDROMORPHONE HYDROCHLORIDE 0.5 MG: 1 INJECTION, SOLUTION INTRAMUSCULAR; INTRAVENOUS; SUBCUTANEOUS at 17:15

## 2020-01-21 RX ADMIN — GLYCOPYRROLATE 0.6 MG: 0.2 INJECTION, SOLUTION INTRAMUSCULAR; INTRAVENOUS at 10:47

## 2020-01-21 RX ADMIN — EPHEDRINE SULFATE 5 MG: 50 INJECTION, SOLUTION INTRAVENOUS at 10:22

## 2020-01-21 RX ADMIN — PROPOFOL 200 MG: 10 INJECTION, EMULSION INTRAVENOUS at 09:27

## 2020-01-21 RX ADMIN — LIDOCAINE HYDROCHLORIDE 100 MG: 10 INJECTION, SOLUTION EPIDURAL; INFILTRATION; INTRACAUDAL; PERINEURAL at 09:27

## 2020-01-21 RX ADMIN — OXYCODONE HYDROCHLORIDE AND ACETAMINOPHEN 2 TABLET: 5; 325 TABLET ORAL at 18:54

## 2020-01-21 RX ADMIN — MIDAZOLAM HYDROCHLORIDE 2 MG: 1 INJECTION, SOLUTION INTRAMUSCULAR; INTRAVENOUS at 09:23

## 2020-01-21 RX ADMIN — ONDANSETRON 4 MG: 2 INJECTION INTRAMUSCULAR; INTRAVENOUS at 08:26

## 2020-01-21 RX ADMIN — FENTANYL CITRATE 25 MCG: 50 INJECTION INTRAMUSCULAR; INTRAVENOUS at 11:23

## 2020-01-21 RX ADMIN — HEPARIN SODIUM 5000 UNITS: 5000 INJECTION INTRAVENOUS; SUBCUTANEOUS at 22:05

## 2020-01-21 RX ADMIN — KETOROLAC TROMETHAMINE 15 MG: 30 INJECTION, SOLUTION INTRAMUSCULAR; INTRAVENOUS at 15:49

## 2020-01-21 RX ADMIN — ONDANSETRON 4 MG: 2 INJECTION INTRAMUSCULAR; INTRAVENOUS at 10:41

## 2020-01-21 RX ADMIN — OXYCODONE HYDROCHLORIDE AND ACETAMINOPHEN 1 TABLET: 5; 325 TABLET ORAL at 12:25

## 2020-01-21 RX ADMIN — SODIUM CHLORIDE, POTASSIUM CHLORIDE, SODIUM LACTATE AND CALCIUM CHLORIDE 100 ML/HR: 600; 310; 30; 20 INJECTION, SOLUTION INTRAVENOUS at 15:31

## 2020-01-21 RX ADMIN — SODIUM CHLORIDE, POTASSIUM CHLORIDE, SODIUM LACTATE AND CALCIUM CHLORIDE: 600; 310; 30; 20 INJECTION, SOLUTION INTRAVENOUS at 10:02

## 2020-01-21 RX ADMIN — FENTANYL CITRATE 50 MCG: 50 INJECTION INTRAMUSCULAR; INTRAVENOUS at 10:58

## 2020-01-21 RX ADMIN — SODIUM CHLORIDE, POTASSIUM CHLORIDE, SODIUM LACTATE AND CALCIUM CHLORIDE 75 ML/HR: 600; 310; 30; 20 INJECTION, SOLUTION INTRAVENOUS at 14:33

## 2020-01-21 NOTE — INTERVAL H&P NOTE
H&P reviewed  After examining the patient I find no changes in the patients condition since the H&P had been written      Vitals:    01/21/20 0804   BP: 147/82   Pulse: 79   Resp: 16   Temp: 98 2 °F (36 8 °C)   SpO2: 98%

## 2020-01-21 NOTE — DISCHARGE INSTR - AVS FIRST PAGE
[facility]    Discharge Instructions  Light activity for 2 weeks  No heavy lifting for 2 weeks  Max 10 lbs for 2 weeks  No driving for 3-7 days or until pain is well controlled  Surgical glue will fall off with time  You may shower over dressing, replace if soiled  Take discharge medications as prescribed  Notify our office for nausea, vomiting, fever, diarrhea, chest pain, trouble breathing  Follow up in our office in 2 weeks or sooner if needed  Call with additional questions or concerns [phone]

## 2020-01-21 NOTE — ANESTHESIA PREPROCEDURE EVALUATION
Review of Systems/Medical History  Patient summary reviewed  Chart reviewed      Cardiovascular   Pulmonary  Asthma , well controlled/ stable ,        GI/Hepatic    Liver disease ,             Endo/Other     GYN    Hysterectomy, Ovarian cancer,        Hematology   Musculoskeletal       Neurology   Psychology                Anesthesia Plan  ASA Score- 2     Anesthesia Type- general with ASA Monitors  Additional Monitors:   Airway Plan: ETT  Plan Factors-    Induction- intravenous  Postoperative Plan- Plan for postoperative opioid use  Planned trial extubation    Informed Consent- Anesthetic plan and risks discussed with patient  I personally reviewed this patient with the CRNA  Discussed and agreed on the Anesthesia Plan with the CRNA  Kirk Jaramillo

## 2020-01-21 NOTE — NURSING NOTE
Report from pacu, pt to room 114 2, oriented to the room and the callbell, lap site cdi, abd pain controlled with the, meds ordered, voiding without any problem, family at bedside, callbell in reach of the pt

## 2020-01-21 NOTE — OP NOTE
OPERATIVE REPORT  PATIENT NAME: Bairon Gonzalez    :  1970  MRN: [id number]  Pt Location: [address] [place] [address] OR ROOM 02    SURGERY DATE: 2020    Surgeon(s) and Role:     Alcon Raza MD - Primary     * Agapito Weber PA-C - Assisting  The PA was necessary to provide expert assistance; i e  in the form of providing optimal exposure with retraction, suturing, and assistance with dissection in order to perform the most efficient operation and in order to optimize patient safety in the abscence of a qualified surgical resident  Preop Diagnosis:  Incisional hernia, without obstruction or gangrene [K43 2]    Post-Op Diagnosis Codes:     * Incisional hernia, without obstruction or gangrene [K43 2]    Procedure(s) (LRB):  LAPAROSCOPIC VENTRAL HERNIA REPAIR WITH MESH (N/A)    Specimen(s):  * No specimens in log *    Estimated Blood Loss:   Minimal    Drains:  NG/OG/Enteral Tube Orogastric 14 Fr Center mouth (Active)   Number of days: 0       Urethral Catheter Latex 16 Fr  (Active)   Collection Container Standard drainage bag 2020 10:00 AM   Number of days: 0       Anesthesia Type:   General    Operative Indications:  Incisional hernia, without obstruction or gangrene [K43 2]  The patient is a 47-year-old female with a past medical history significant for stage I ovarian carcinoma presenting with a symptomatic ventral incisional hernia for which definitive treatment by laparoscopic mesh repair is now indicated  Operative Findings: At the time surgery the patient is found to have several fascial defects in continuity in the midline centered around the umbilicus  Patient had adhesions of colon to the anterior abdominal wall both in the pelvis with the sigmoid adherent to the anterior abdominal wall and in the upper midline with the transverse colon adherent to the anterior abdominal wall  A laparoscopic lysis of adhesions was undertaken      Satisfied with the lysis of adhesions the fascial defect measured out  The decision made to proceed with a placement of a 7 x 9 in ventral light ST mesh to cover all fascial defects and in anticipation of potential new weak points at the superior and inferior poles of the laparotomy incision  The mesh secured with 2 rows of absorbable tacks  The fascial defect in the left upper quadrant closed with the 0 Vicryl suture and the procedure completed uneventfully  The patient tolerated the procedure well  Complications:   None    Procedure and Technique:  The patient was taken to the operating room where they are properly identified, monitored and anesthetized with general endotracheal anesthesia  They received antibiotics perioperatively  Venodynes used for DVT prophylaxis  Time-out performed  Skin prepped and draped  Skin incised in the left upper quadrant  A blunt-tipped 12 mm trocar was advanced into the peritoneal cavity and pneumoperitoneum established to 15 mm of mercury  A 10 mm 30 degree scope was advanced  The 4 quadrants of the peritoneal cavity was inspected laparoscopically  The ventral incisional hernia was identified  A 2nd 5 mm trocar was placed in the left lower quadrant  All adhesions of omentum were taken down laparoscopically with Thunderbeat  The falciform ligament was taken down with the Thunderbeat  The Ventralight STmesh was rolled up and delivered through the left upper quadrant trocar site  A stab wound made in the anterior abdominal wall  The Endo close device used to grasp the blue catheter on the mesh  The green lattice was inflated  The mesh brought up to the anterior abdominal wall and oriented  It was secured in 2 rows with Sorbafix tacks  The green lattice was removed through the left upper quadrant trocar site  Two additional working ports were placed on the right side of the abdomen to help secure the mesh circumferentially   Satisfied with the lie of the mesh, the procedure completed with closure of the left upper quadrant trocar site with an endo-close device and an 0 Vicryl suture  The pneumoperitoneum was released  All skin incisions closed with subcuticular 4 Monocryl suture  Wounds infiltrated with 0 5% Marcaine  Wounds dressed  The patient extubated and taken to recovery in stable condition         I was present for the entire procedure    Patient Disposition:  PACU     SIGNATURE: Barbie Alves MD  DATE: January 21, 2020  TIME: 10:51 AM

## 2020-01-21 NOTE — PLAN OF CARE
Pt admitted at this time  Problem: Potential for Falls  Goal: Patient will remain free of falls  Description  INTERVENTIONS:  - Assess patient frequently for physical needs  -  Identify cognitive and physical deficits and behaviors that affect risk of falls    -  [place] fall precautions as indicated by assessment   - Educate patient/family on patient safety including physical limitations  - Instruct patient to call for assistance with activity based on assessment  - Modify environment to reduce risk of injury  - Consider OT/PT consult to assist with strengthening/mobility  Outcome: Not Progressing     Problem: PAIN - ADULT  Goal: Verbalizes/displays adequate comfort level or baseline comfort level  Description  Interventions:  - Encourage patient to monitor pain and request assistance  - Assess pain using appropriate pain scale  - Administer analgesics based on type and severity of pain and evaluate response  - Implement non-pharmacological measures as appropriate and evaluate response  - Consider cultural and social influences on pain and pain management  - Notify physician/advanced practitioner if interventions unsuccessful or patient reports new pain  Outcome: Not Progressing     Problem: INFECTION - ADULT  Goal: Absence or prevention of progression during hospitalization  Description  INTERVENTIONS:  - Assess and monitor for signs and symptoms of infection  - Monitor lab/diagnostic results  - Monitor all insertion sites, i e  indwelling lines, tubes, and drains  - Monitor endotracheal if appropriate and nasal secretions for changes in amount and color  - [place] appropriate cooling/warming therapies per order  - Administer medications as ordered  - Instruct and encourage patient and family to use good hand hygiene technique  - Identify and instruct in appropriate isolation precautions for identified infection/condition  Outcome: Not Progressing  Goal: Absence of fever/infection during neutropenic period  Description  INTERVENTIONS:  - Monitor WBC    Outcome: Not Progressing     Problem: SAFETY ADULT  Goal: Maintain or return to baseline ADL function  Description  INTERVENTIONS:  -  Assess patient's ability to carry out ADLs; assess patient's baseline for ADL function and identify physical deficits which impact ability to perform ADLs (bathing, care of mouth/teeth, toileting, grooming, dressing, etc )  - Assess/evaluate cause of self-care deficits   - Assess range of motion  - Assess patient's mobility; develop plan if impaired  - Assess patient's need for assistive devices and provide as appropriate  - Encourage maximum independence but intervene and supervise when necessary  - Involve family in performance of ADLs  - Assess for home care needs following discharge   - Consider OT consult to assist with ADL evaluation and planning for discharge  - Provide patient education as appropriate  Outcome: Not Progressing  Goal: Maintain or return mobility status to optimal level  Description  INTERVENTIONS:  - Assess patient's baseline mobility status (ambulation, transfers, stairs, etc )    - Identify cognitive and physical deficits and behaviors that affect mobility  - Identify mobility aids required to assist with transfers and/or ambulation (gait belt, sit-to-stand, lift, walker, cane, etc )  - [place] fall precautions as indicated by assessment  - Record patient progress and toleration of activity level on Mobility SBAR; progress patient to next Phase/Stage  - Instruct patient to call for assistance with activity based on assessment  - Consider rehabilitation consult to assist with strengthening/weightbearing, etc   Outcome: Not Progressing     Problem: DISCHARGE PLANNING  Goal: Discharge to home or other facility with appropriate resources  Description  INTERVENTIONS:  - Identify barriers to discharge w/patient and caregiver  - Arrange for needed discharge resources and transportation as appropriate  - Identify discharge learning needs (meds, wound care, etc )  - Arrange for interpretive services to assist at discharge as needed  - Refer to Case Management Department for coordinating discharge planning if the patient needs post-hospital services based on physician/advanced practitioner order or complex needs related to functional status, cognitive ability, or social support system  Outcome: Not Progressing     Problem: Knowledge Deficit  Goal: Patient/family/caregiver demonstrates understanding of disease process, treatment plan, medications, and discharge instructions  Description  Complete learning assessment and assess knowledge base    Interventions:  - Provide teaching at level of understanding  - Provide teaching via preferred learning methods  Outcome: Not Progressing     Problem: GASTROINTESTINAL - ADULT  Goal: Minimal or absence of nausea and/or vomiting  Description  INTERVENTIONS:  - Administer IV fluids if ordered to ensure adequate hydration  - Maintain NPO status until nausea and vomiting are resolved  - Nasogastric tube if ordered  - Administer ordered antiemetic medications as needed  - Provide nonpharmacologic comfort measures as appropriate  - Advance diet as tolerated, if ordered  - Consider nutrition services referral to assist patient with adequate nutrition and appropriate food choices  Outcome: Not Progressing  Goal: Maintains or returns to baseline bowel function  Description  INTERVENTIONS:  - Assess bowel function  - Encourage oral fluids to ensure adequate hydration  - Administer IV fluids if ordered to ensure adequate hydration  - Administer ordered medications as needed  - Encourage mobilization and activity  - Consider nutritional services referral to assist patient with adequate nutrition and appropriate food choices  Outcome: Not Progressing  Goal: Maintains adequate nutritional intake  Description  INTERVENTIONS:  - Monitor percentage of each meal consumed  - Identify factors contributing to decreased intake, treat as appropriate  - Assist with meals as needed  - Monitor I&O, weight, and lab values if indicated  - Obtain nutrition services referral as needed  Outcome: Not Progressing  Goal: Establish and maintain optimal ostomy function  Description  INTERVENTIONS:  - Assess bowel function  - Encourage oral fluids to ensure adequate hydration  - Administer IV fluids if ordered to ensure adequate hydration   - Administer ordered medications as needed  - Encourage mobilization and activity  - Nutrition services referral to assist patient with appropriate food choices  - Assess stoma site  - Consider wound care consult   Outcome: Not Progressing     Problem: SKIN/TISSUE INTEGRITY - ADULT  Goal: Skin integrity remains intact  Description  INTERVENTIONS  - Identify patients at risk for skin breakdown  - Assess and monitor skin integrity  - Assess and monitor nutrition and hydration status  - Monitor labs (i e  albumin)  - Assess for incontinence   - Turn and reposition patient  - Assist with mobility/ambulation  - Relieve pressure over bony prominences  - Avoid friction and shearing  - Provide appropriate hygiene as needed including keeping skin clean and dry  - Evaluate need for skin moisturizer/barrier cream  - Collaborate with interdisciplinary team (i e  Nutrition, Rehabilitation, etc )   - Patient/family teaching  Outcome: Not Progressing  Goal: Incision(s), wounds(s) or drain site(s) healing without S/S of infection  Description  INTERVENTIONS  - Assess and document risk factors for skin impairment   - Assess and document dressing, incision, wound bed, drain sites and surrounding tissue  - Consider nutrition services referral as needed  - Oral mucous membranes remain intact  - Provide patient/ family education  Outcome: Not Progressing  Goal: Oral mucous membranes remain intact  Description  INTERVENTIONS  - Assess oral mucosa and hygiene practices  - Implement preventative oral hygiene regimen  - Implement oral medicated treatments as ordered  - Initiate Nutrition services referral as needed  Outcome: Not Progressing

## 2020-01-21 NOTE — RESPIRATORY THERAPY NOTE
RT Protocol Note  Andres Howard 44 y o  female MRN: [id number]  Unit/Bed#: -02 Encounter: [id number]    Assessment    Active Problems:    * No active hospital problems   *      Home Pulmonary Medications:  Pro Air MDI PRN ("very rare")  Home Devices/Therapy: (P) Other (Comment)(Pro Air PRN)    Past Medical History:   Diagnosis Date    Asthma     Ovarian cancer ([facility] [address] ) 2019    Ovarian cyst      Social History     Socioeconomic History    Marital status: /Civil Union     Spouse name: None    Number of children: None    Years of education: None    Highest education level: None   Occupational History    None   Social Needs    Financial resource strain: None    Food insecurity:     Worry: None     Inability: None    Transportation needs:     Medical: None     Non-medical: None   Tobacco Use    Smoking status: Never Smoker    Smokeless tobacco: Never Used   Substance and Sexual Activity    Alcohol use: Not Currently     Alcohol/week: 0 0 standard drinks     Frequency: Never     Drinks per session: Patient refused     Binge frequency: Patient refused    Drug use: No    Sexual activity: Yes     Partners: Male     Comment:    Lifestyle    Physical activity:     Days per week: None     Minutes per session: None    Stress: None   Relationships    Social connections:     Talks on phone: None     Gets together: None     Attends [employer] service: None     Active member of club or organization: None     Attends meetings of clubs or organizations: None     Relationship status: None    Intimate partner violence:     Fear of current or ex partner: None     Emotionally abused: None     Physically abused: None     Forced sexual activity: None   Other Topics Concern    None   Social History Narrative    None       Subjective         Objective    Physical Exam:   Assessment Type: (P) Assess only  General Appearance: (P) Alert, Awake  Respiratory Pattern: (P) Normal  Chest Assessment: (P) Chest expansion symmetrical  Bilateral Breath Sounds: (P) Clear, Diminished  Cough: (P) None  O2 Device: (P) RA    Vitals:  Blood pressure (!) 176/90, pulse (P) 84, temperature (!) 97 4 °F (36 3 °C), temperature source Tympanic, resp  rate (P) 16, height 5' 1" (1 549 m), weight 53 5 kg (118 lb), last menstrual period 01/02/2019, SpO2 (P) 98 %  Imaging and other studies: I have personally reviewed pertinent reports  O2 Device: (P) RA     Plan    Respiratory Plan: (P) Home Bronchodilator Patient pathway  Airway Clearance Plan: (P) Incentive Spirometer     Resp Comments: (P) Pt  assessed  No SOB or Respiratory distress noted  Pt  uses a ProAir MDI PRN ("very rare") @ home  Pt  did not feel that she required any breathing medications @ this time  Pt  requested to hold the Incentive Spirometer, due to "great pain"  RN is aware and is medicating patient  Pt  stated that she knows how to use the IS from previous admissions  Will continue to monitor Pt

## 2020-01-21 NOTE — ANESTHESIA POSTPROCEDURE EVALUATION
Post-Op Assessment Note    CV Status:  Stable  Pain Score: 7    Pain management: inadequate     Mental Status:  Arousable   Hydration Status:  Stable   PONV Controlled:  None   Airway Patency:  Patent   Post Op Vitals Reviewed: Yes      Staff: CRNA           BP   175/72   Temp      Pulse  78   Resp   12   SpO2   100%

## 2020-01-21 NOTE — DISCHARGE INSTRUCTIONS
Ventral Hernia Repair   WHAT YOU NEED TO KNOW:   A ventral hernia repair is surgery to fix a ventral hernia  A ventral hernia may be repaired if the hernia is preventing blood flow to organs or blocking the intestines  It may be done laparoscopically or open  Laparoscopically means that your healthcare provider will use several small incisions to fix the hernia  In an open repair, your healthcare provider will make one incision to fix your hernia  DISCHARGE INSTRUCTIONS:   Call 911 for any of the following:   · You feel lightheaded, short of breath, and have chest pain  · You cough up blood  · You have trouble breathing  Seek care immediately if:   · Your arm or leg feels warm, tender, and painful  It may look swollen and red  · Blood soaks through your bandage  · Your abdomen feels hard and looks bigger than usual      · Your bowel movements are black, bloody, or tarry-looking  Contact your healthcare provider if:   · You have a fever above 101° F      · You develop a skin rash, hives, or itching  · Your incision is swollen, red, or draining pus or fluid  · You have nausea, or you are vomiting  · You cannot have a bowel movement  · Your pain does not get better after taking pain medicine  · You have questions or concerns about your condition or care  Medicines: You may need any of the following:  · Prescription pain medicine  may be given  Ask your healthcare provider how to take this medicine safely  · NSAIDs , such as ibuprofen, help decrease swelling, pain, and fever  This medicine is available with or without a doctor's order  NSAIDs can cause stomach bleeding or kidney problems in certain people  If you take blood thinner medicine, always ask your healthcare provider if NSAIDs are safe for you  Always read the medicine label and follow directions  · Take your medicine as directed    Contact your healthcare provider if you think your medicine is not helping or if you have side effects  Tell him or her if you are allergic to any medicine  Keep a list of the medicines, vitamins, and herbs you take  Include the amounts, and when and why you take them  Bring the list or the pill bottles to follow-up visits  Carry your medicine list with you in case of an emergency  Care for your wound as directed:  Carefully wash around your wound  It is okay to let soap and water run over your wound  Do not  scrub your wound  Dry the area and put on new, clean bandages as directed  Change your bandages when they get wet or dirty  If you have strips of medical tape over your incision, allow them to fall off on their own  Do not get in a bathtub, swimming pool, or hot tub until your healthcare provider says it is okay  Self-care:   · Eat a variety of healthy foods  Healthy foods include fruits, vegetables, whole-grain breads, low-fat dairy products, beans, lean meats, and fish  Healthy foods may help you heal faster  Ask if you need to be on a special diet  · Drink liquids as directed  Liquids may prevent constipation and straining during a bowel movement  This will help prevent pressure on your incision, and another hernia from happening  Ask how much liquid to drink each day and which liquids are best for you  · Apply ice  on your incision for 15 to 20 minutes every hour or as directed  Use an ice pack, or put crushed ice in a plastic bag  Cover it with a towel  Ice helps prevent tissue damage and decreases swelling and pain  · Take deep breaths and cough  10 times each hour  This will decrease your risk for a lung infection  Take a deep breath and hold it for as long as you can  Let the air out and then cough strongly  Deep breaths help open your airway  You may be given an incentive spirometer to help you take deep breaths  Put the plastic piece in your mouth and take a slow, deep breath, then let the air out and cough  Repeat these steps 10 times every hour   Press a pillow lightly against your incision when you cough  This may decrease pain or discomfort  · Wear a binder as directed  Your healthcare provider may tell you to wear a binder over your incision  A binder is an elastic bandage that wraps around your abdomen and over your incision  It fits snug and helps decrease pain when you move or cough  Driving:  Do not drive for at least one week after surgery  Do not drive if you are taking prescription pain medication  Ask your healthcare provider when it is safe for you to drive  Activity:  Do not lift anything heavy until your healthcare provider says it is okay  This may put too much pressure on your incision and cause it to come apart  It may also increase your risk for another hernia  Do not play sports for 2 to 3 weeks  Ask your healthcare provider when you can return to work and your normal activities  Follow up with your healthcare provider as directed:  Write down your questions so you remember to ask them during your visits  © 2017 [employer]  Information is for End User's use only and may not be sold, redistributed or otherwise used for commercial purposes  All illustrations and images included in CareNotes® are the copyrighted property of [employer]  or Lupillo Henriquez  The above information is an  only  It is not intended as medical advice for individual conditions or treatments  Talk to your doctor, nurse or pharmacist before following any medical regimen to see if it is safe and effective for you

## 2020-01-22 PROCEDURE — NC001 PR NO CHARGE: Performed by: PHYSICIAN ASSISTANT

## 2020-01-22 PROCEDURE — 99024 POSTOP FOLLOW-UP VISIT: CPT | Performed by: PHYSICIAN ASSISTANT

## 2020-01-22 RX ADMIN — METHOCARBAMOL 500 MG: 500 TABLET, FILM COATED ORAL at 04:46

## 2020-01-22 RX ADMIN — HEPARIN SODIUM 5000 UNITS: 5000 INJECTION INTRAVENOUS; SUBCUTANEOUS at 06:21

## 2020-01-22 RX ADMIN — KETOROLAC TROMETHAMINE 15 MG: 30 INJECTION, SOLUTION INTRAMUSCULAR; INTRAVENOUS at 06:19

## 2020-01-22 RX ADMIN — OXYCODONE HYDROCHLORIDE AND ACETAMINOPHEN 2 TABLET: 5; 325 TABLET ORAL at 15:34

## 2020-01-22 RX ADMIN — HYDROMORPHONE HYDROCHLORIDE 0.5 MG: 1 INJECTION, SOLUTION INTRAMUSCULAR; INTRAVENOUS; SUBCUTANEOUS at 02:19

## 2020-01-22 RX ADMIN — OXYCODONE HYDROCHLORIDE AND ACETAMINOPHEN 1 TABLET: 5; 325 TABLET ORAL at 01:03

## 2020-01-22 RX ADMIN — HEPARIN SODIUM 5000 UNITS: 5000 INJECTION INTRAVENOUS; SUBCUTANEOUS at 15:24

## 2020-01-22 RX ADMIN — OXYCODONE HYDROCHLORIDE AND ACETAMINOPHEN 2 TABLET: 5; 325 TABLET ORAL at 06:22

## 2020-01-22 RX ADMIN — METHOCARBAMOL 500 MG: 500 TABLET, FILM COATED ORAL at 21:18

## 2020-01-22 RX ADMIN — HEPARIN SODIUM 5000 UNITS: 5000 INJECTION INTRAVENOUS; SUBCUTANEOUS at 21:18

## 2020-01-22 RX ADMIN — METHOCARBAMOL 500 MG: 500 TABLET, FILM COATED ORAL at 10:46

## 2020-01-22 RX ADMIN — KETOROLAC TROMETHAMINE 15 MG: 30 INJECTION, SOLUTION INTRAMUSCULAR; INTRAVENOUS at 11:51

## 2020-01-22 RX ADMIN — KETOROLAC TROMETHAMINE 15 MG: 30 INJECTION, SOLUTION INTRAMUSCULAR; INTRAVENOUS at 18:10

## 2020-01-22 RX ADMIN — SODIUM CHLORIDE, POTASSIUM CHLORIDE, SODIUM LACTATE AND CALCIUM CHLORIDE 100 ML/HR: 600; 310; 30; 20 INJECTION, SOLUTION INTRAVENOUS at 01:39

## 2020-01-22 NOTE — UTILIZATION REVIEW
Initial Clinical Review    Elective  OUTPATIENT   surgical procedure  Age/Sex: 44 y o  female  Surgery Date: 1/21   Procedure:  LAPAROSCOPIC VENTRAL HERNIA REPAIR WITH MESH  Anesthesia:  General     Operative Findings:   several fascial defects in continuity in the midline centered around the umbilicus  Patient had adhesions of colon to the anterior abdominal wall both in the pelvis with the sigmoid adherent to the anterior abdominal wall and in the upper midline with the transverse colon adherent to the anterior abdominal wall  A laparoscopic lysis of adhesions was undertaken      Satisfied with the lysis of adhesions the fascial defect measured out  The decision made to proceed with a placement of a 7 x 9 in ventral light ST mesh to cover all fascial defects and in anticipation of potential new weak points at the superior and inferior poles of the laparotomy incision      The mesh secured with 2 rows of absorbable tacks  The fascial defect in the left upper quadrant closed with the 0 Vicryl suture and the procedure completed uneventfully  The patient tolerated the procedure well      POD#1 Progress Note:   Patient is stable but  having a lot of pain  Patient has still not ambulated or eaten  advised to start mild activity such as making rounds in the hallway  Patient was advised to try eating what she can and continue drinking fluids  IVF DC'ed       @  1530:  Avss,  Last IV med before noon   PO intake currently documented as 240 cc today    Aman Horton     Admission Orders: Date/Time/Statement:     STATUS  Corrected to OPNCB  On  1/22  @  7435 Norma Serum Norma Serum Norma Serum   Norma Serum Vital Signs: /84 (BP Location: Left arm)   Pulse 71   Temp 98 8 °F (37 1 °C) (Tympanic)   Resp 17   Ht 5' 1" (1 549 m)   Wt 53 5 kg (118 lb)   LMP 01/02/2019 (LMP Unknown)   SpO2 99%   BMI 22 30 kg/m²      01/22/20 0817  98 8 °F   71  17  156/84  99 % rm air    01/21/20 2201  98 3 °F  96  17  144/80  98 %        Diet:  Advance as tolerated   Mobility: amb q shift  DVT Prophylaxis:  SCD's     Medications/Pain Control:   Post op pt received  Percocet prn x4,  Robaxin x3,  IV dilaudid x3  Scheduled  IV toradol q 6 hr  1/22  IVF dc'ed     Scheduled Medications:    Medications:  heparin (porcine) 5,000 Units Subcutaneous Q8H [address]   ketorolac 15 mg Intravenous Q6H [address]     Continuous IV Infusions:    lactated ringers 100 mL/hr Intravenous Continuous  dc'ed on 1/22   @  0944     PRN Meds:    acetaminophen 650 mg Oral Q6H PRN   albuterol 2 puff Inhalation Q4H PRN   HYDROmorphone 0 5 mg Intravenous Q3H PRN   methocarbamol 500 mg Oral Q6H PRN   ondansetron 4 mg Intravenous Q6H PRN   oxyCODONE-acetaminophen 1 tablet Oral Q6H PRN   oxyCODONE-acetaminophen 2 tablet Oral Q6H PRN         Network Utilization Review [email or web address]  [email or web address]  ATTENTION: Please call with any questions or concerns to [phone] and carefully listen to the prompts so that you are directed to the right person  All voicemails are confidential   Bella Muss all requests for admission clinical reviews, approved or denied determinations and any other requests to dedicated fax number below belonging to the campus where the patient is receiving treatment  List of dedicated fax numbers for the Facilities:  [address] DENIALS (Administrative/Medical Necessity) [phone]   [address] (Maternity/NICU/Pediatrics) [phone]   Fortino Naeem [phone]     Michele Major [phone]   [facility] [phone]   [address] [phone]   [facility]  [phone]   [address] [facility]  [address] [facility] [address] [facility] [phone]

## 2020-01-22 NOTE — ASSESSMENT & PLAN NOTE
Postop day 1 after elective laparoscopic ventral incisional hernia repair with mesh  Ongoing pain, but otherwise clinically and hemodynamically stable  Continue pain medications as ordered  Continue muscle relaxant as ordered  DC IV fluids  Encourage oral intake  Encourage light ambulation today  Outlined criteria for discharge including:  Pain controlled with oral medications only, tolerating a diet, urinating freely, vitals stable  Potential for discharge home today if she meets this criteria, otherwise will keep additional night for pain control and observation  Case discussed with Dr Tha Toribio

## 2020-01-22 NOTE — PROGRESS NOTES
Progress Note - An Hwang 1970, 44 y o  female MRN: [id number]    Unit/Bed#: -02 Encounter: [id number]    Primary Care Provider: Ruslan Webber DO   Date and time admitted to hospital: 1/21/2020  7:28 AM        * Incisional hernia, without obstruction or gangreneresolved as of 1/21/2020  Assessment & Plan  Postop day 1 after elective laparoscopic ventral incisional hernia repair with mesh  Ongoing pain, but otherwise clinically and hemodynamically stable  Continue pain medications as ordered  Continue muscle relaxant as ordered  DC IV fluids  Encourage oral intake  Encourage light ambulation today  Outlined criteria for discharge including:  Pain controlled with oral medications only, tolerating a diet, urinating freely, vitals stable  Potential for discharge home today if she meets this criteria, otherwise will keep additional night for pain control and observation  Case discussed with Dr Manuel Party  Subjective/Objective   Chief Complaint: "I didn't sleep much"    Subjective: Patient reports minimal sleep last night due to abdominal pain  Describes incisional pain and what she believes to be muscle spasms  Responding to oral medications, trying to stay ahead of the pain  Poor appetite, tolerating sips  Urinating in bed pan due to pain when moving  Not yet out of bed or ambulating  Using incentive spirometry sparingly, but practicing slow deep breathing techniques  No fever, chills, chest pains, or dyspnea  Objective: Nursing reports poorly controlled pain last night  Blood pressure 156/84, pulse 71, temperature 98 8 °F (37 1 °C), temperature source Tympanic, resp  rate 17, height 5' 1" (1 549 m), weight 53 5 kg (118 lb), last menstrual period 01/02/2019, SpO2 99 %  ,Body mass index is 22 3 kg/m²        Intake/Output Summary (Last 24 hours) at 1/22/2020 0953  Last data filed at 1/22/2020 0139  Gross per 24 hour   Intake 2640 ml   Output 650 ml   Net 1990 ml       Invasive Devices Peripheral Intravenous Line            Peripheral IV 01/21/20 Left Wrist 1 day              Physical Exam   Constitutional: She is oriented to person, place, and time  She appears well-developed and well-nourished  No distress  HENT:   Head: Normocephalic and atraumatic  Eyes: Pupils are equal, round, and reactive to light  Conjunctivae and EOM are normal    Neck: Normal range of motion  Cardiovascular: Normal rate and regular rhythm  No murmur heard  Pulmonary/Chest: Effort normal and breath sounds normal  No respiratory distress  Abdominal: Soft  Abdomen soft  Generalized tenderness to palpation  Erlene Fast in place  Musculoskeletal: Normal range of motion  Neurological: She is alert and oriented to person, place, and time  Skin: Skin is warm and dry  Capillary refill takes less than 2 seconds  She is not diaphoretic  Psychiatric: She has a normal mood and affect  Her behavior is normal    Vitals reviewed  Lab, Imaging and other studies:I have personally reviewed pertinent lab results      VTE Pharmacologic Prophylaxis: Heparin  VTE Mechanical Prophylaxis: sequential compression device

## 2020-01-22 NOTE — PROGRESS NOTES
Patient is a 44year old female status post laparoscopic incisional hernia repair with mesh  Admitted for observation yesterday due to severe pain  This morning patient states that she was just given percocet and robaxin, which are helping control her pain  Patient is also on scheduled toradol 15mg q6hr IV for pain  Patient has been using ice on her abdomen  Patient reports that she has been having severe muscle spasms all yesterday and last night  Admits that she used a bed pan because she is not able to ambulate to the bathroom because of her pain  Patient admits that she has been drinking plenty of fluids but has not eaten anything  Patient's vital signs were reviewed and are within normal limits and have been stable since her arrival      Physical exam:  General appearance: AAOx3  Well-developed, cooperative, and a reliable historian  Patient appears to be under no acute distress  Heart: RRR  No murmurs, gallops, rubs  Lungs: Clear to ascultation bilaterally anteriorly  No wheezes, rales, rhonchi  Abdomen: Brace is present  Abdomen is round and soft  Normal active bowel sounds in all four quadrants  Incision sites are non-edematous and non-erythematous  Tenderness to light palpation and percussion of all four quadrants  I&O's:  Inputs: 100mL/hr lactated ringers IV  Output: not recorded    No new imaging or labs ordered  Assessment:  Patient is a 44year old female status post laparoscopic incisional hernia repair with mesh  Patient is stable but  having a lot of pain  Patient has still not ambulated or eaten  Plan:  Patient will remain in the hospital for another day due to current status  Patient was advised to start mild activity such as making rounds in the hallway  Patient was advised to try eating what she can and continue drinking fluids  Continue all scheduled, continuous, and prn medications

## 2020-01-22 NOTE — SOCIAL WORK
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 and he was available to assist her after her last surgery  Explained the Observation level of care with the pt who verbalized understanding of same  Spouse will transport home when discharged  Patient/caregiver received discharge checklist   Content reviewed  Patient/caregiver encouraged to participate in discharge plan of care prior to discharge home  CM reviewed d/c planning process including the following: identifying help at home, patient preference for d/c planning needs, availability of treatment team to discuss questions or concerns patient and/or family may have regarding understanding medications and recognizing signs and symptoms once discharged  CM also encouraged patient to follow up with all recommended appointments after discharge  Patient advised of importance for patient and family to participate in managing patients medical well being

## 2020-01-22 NOTE — PLAN OF CARE
Problem: Potential for Falls  Goal: Patient will remain free of falls  Description  INTERVENTIONS:  - Assess patient frequently for physical needs  -  Identify cognitive and physical deficits and behaviors that affect risk of falls    -  [place] fall precautions as indicated by assessment   - Educate patient/family on patient safety including physical limitations  - Instruct patient to call for assistance with activity based on assessment  - Modify environment to reduce risk of injury  - Consider OT/PT consult to assist with strengthening/mobility  Outcome: Progressing     Problem: PAIN - ADULT  Goal: Verbalizes/displays adequate comfort level or baseline comfort level  Description  Interventions:  - Encourage patient to monitor pain and request assistance  - Assess pain using appropriate pain scale  - Administer analgesics based on type and severity of pain and evaluate response  - Implement non-pharmacological measures as appropriate and evaluate response  - Consider cultural and social influences on pain and pain management  - Notify physician/advanced practitioner if interventions unsuccessful or patient reports new pain  Outcome: Progressing     Problem: INFECTION - ADULT  Goal: Absence or prevention of progression during hospitalization  Description  INTERVENTIONS:  - Assess and monitor for signs and symptoms of infection  - Monitor lab/diagnostic results  - Monitor all insertion sites, i e  indwelling lines, tubes, and drains  - Monitor endotracheal if appropriate and nasal secretions for changes in amount and color  - [place] appropriate cooling/warming therapies per order  - Administer medications as ordered  - Instruct and encourage patient and family to use good hand hygiene technique  - Identify and instruct in appropriate isolation precautions for identified infection/condition  Outcome: Progressing  Goal: Absence of fever/infection during neutropenic period  Description  INTERVENTIONS:  - Monitor WBC    Outcome: Progressing     Problem: SAFETY ADULT  Goal: Maintain or return to baseline ADL function  Description  INTERVENTIONS:  -  Assess patient's ability to carry out ADLs; assess patient's baseline for ADL function and identify physical deficits which impact ability to perform ADLs (bathing, care of mouth/teeth, toileting, grooming, dressing, etc )  - Assess/evaluate cause of self-care deficits   - Assess range of motion  - Assess patient's mobility; develop plan if impaired  - Assess patient's need for assistive devices and provide as appropriate  - Encourage maximum independence but intervene and supervise when necessary  - Involve family in performance of ADLs  - Assess for home care needs following discharge   - Consider OT consult to assist with ADL evaluation and planning for discharge  - Provide patient education as appropriate  Outcome: Progressing  Goal: Maintain or return mobility status to optimal level  Description  INTERVENTIONS:  - Assess patient's baseline mobility status (ambulation, transfers, stairs, etc )    - Identify cognitive and physical deficits and behaviors that affect mobility  - Identify mobility aids required to assist with transfers and/or ambulation (gait belt, sit-to-stand, lift, walker, cane, etc )  - [place] fall precautions as indicated by assessment  - Record patient progress and toleration of activity level on Mobility SBAR; progress patient to next Phase/Stage  - Instruct patient to call for assistance with activity based on assessment  - Consider rehabilitation consult to assist with strengthening/weightbearing, etc   Outcome: Progressing     Problem: DISCHARGE PLANNING  Goal: Discharge to home or other facility with appropriate resources  Description  INTERVENTIONS:  - Identify barriers to discharge w/patient and caregiver  - Arrange for needed discharge resources and transportation as appropriate  - Identify discharge learning needs (meds, wound care, etc )  - Arrange for interpretive services to assist at discharge as needed  - Refer to Case Management Department for coordinating discharge planning if the patient needs post-hospital services based on physician/advanced practitioner order or complex needs related to functional status, cognitive ability, or social support system  Outcome: Progressing     Problem: Knowledge Deficit  Goal: Patient/family/caregiver demonstrates understanding of disease process, treatment plan, medications, and discharge instructions  Description  Complete learning assessment and assess knowledge base    Interventions:  - Provide teaching at level of understanding  - Provide teaching via preferred learning methods  Outcome: Progressing     Problem: GASTROINTESTINAL - ADULT  Goal: Minimal or absence of nausea and/or vomiting  Description  INTERVENTIONS:  - Administer IV fluids if ordered to ensure adequate hydration  - Maintain NPO status until nausea and vomiting are resolved  - Nasogastric tube if ordered  - Administer ordered antiemetic medications as needed  - Provide nonpharmacologic comfort measures as appropriate  - Advance diet as tolerated, if ordered  - Consider nutrition services referral to assist patient with adequate nutrition and appropriate food choices  Outcome: Progressing  Goal: Maintains or returns to baseline bowel function  Description  INTERVENTIONS:  - Assess bowel function  - Encourage oral fluids to ensure adequate hydration  - Administer IV fluids if ordered to ensure adequate hydration  - Administer ordered medications as needed  - Encourage mobilization and activity  - Consider nutritional services referral to assist patient with adequate nutrition and appropriate food choices  Outcome: Progressing  Goal: Maintains adequate nutritional intake  Description  INTERVENTIONS:  - Monitor percentage of each meal consumed  - Identify factors contributing to decreased intake, treat as appropriate  - Assist with meals as needed  - Monitor I&O, weight, and lab values if indicated  - Obtain nutrition services referral as needed  Outcome: Progressing  Goal: Establish and maintain optimal ostomy function  Description  INTERVENTIONS:  - Assess bowel function  - Encourage oral fluids to ensure adequate hydration  - Administer IV fluids if ordered to ensure adequate hydration   - Administer ordered medications as needed  - Encourage mobilization and activity  - Nutrition services referral to assist patient with appropriate food choices  - Assess stoma site  - Consider wound care consult   Outcome: Progressing     Problem: SKIN/TISSUE INTEGRITY - ADULT  Goal: Skin integrity remains intact  Description  INTERVENTIONS  - Identify patients at risk for skin breakdown  - Assess and monitor skin integrity  - Assess and monitor nutrition and hydration status  - Monitor labs (i e  albumin)  - Assess for incontinence   - Turn and reposition patient  - Assist with mobility/ambulation  - Relieve pressure over bony prominences  - Avoid friction and shearing  - Provide appropriate hygiene as needed including keeping skin clean and dry  - Evaluate need for skin moisturizer/barrier cream  - Collaborate with interdisciplinary team (i e  Nutrition, Rehabilitation, etc )   - Patient/family teaching  Outcome: Progressing  Goal: Incision(s), wounds(s) or drain site(s) healing without S/S of infection  Description  INTERVENTIONS  - Assess and document risk factors for skin impairment   - Assess and document dressing, incision, wound bed, drain sites and surrounding tissue  - Consider nutrition services referral as needed  - Oral mucous membranes remain intact  - Provide patient/ family education  Outcome: Progressing  Goal: Oral mucous membranes remain intact  Description  INTERVENTIONS  - Assess oral mucosa and hygiene practices  - Implement preventative oral hygiene regimen  - Implement oral medicated treatments as ordered  - Initiate Nutrition services referral as needed  Outcome: Progressing     Problem: Prexisting or High Potential for Compromised Skin Integrity  Goal: Skin integrity is maintained or improved  Description  INTERVENTIONS:  - Identify patients at risk for skin breakdown  - Assess and monitor skin integrity  - Assess and monitor nutrition and hydration status  - Monitor labs   - Assess for incontinence   - Turn and reposition patient  - Assist with mobility/ambulation  - Relieve pressure over bony prominences  - Avoid friction and shearing  - Provide appropriate hygiene as needed including keeping skin clean and dry  - Evaluate need for skin moisturizer/barrier cream  - Collaborate with interdisciplinary team   - Patient/family teaching  - Consider wound care consult   Outcome: Progressing

## 2020-01-23 VITALS
OXYGEN SATURATION: 99 % | DIASTOLIC BLOOD PRESSURE: 81 MMHG | BODY MASS INDEX: 22.28 KG/M2 | WEIGHT: 118 LBS | RESPIRATION RATE: 18 BRPM | SYSTOLIC BLOOD PRESSURE: 135 MMHG | TEMPERATURE: 96.8 F | HEART RATE: 72 BPM | HEIGHT: 61 IN

## 2020-01-23 PROCEDURE — 99024 POSTOP FOLLOW-UP VISIT: CPT | Performed by: PHYSICIAN ASSISTANT

## 2020-01-23 RX ORDER — METHOCARBAMOL 500 MG/1
500 TABLET, FILM COATED ORAL EVERY 6 HOURS PRN
Qty: 10 TABLET | Refills: 0
Start: 2020-01-23 | End: 2020-01-23

## 2020-01-23 RX ORDER — OXYCODONE HYDROCHLORIDE AND ACETAMINOPHEN 5; 325 MG/1; MG/1
1 TABLET ORAL EVERY 6 HOURS PRN
Qty: 15 TABLET | Refills: 0 | Status: SHIPPED | OUTPATIENT
Start: 2020-01-23 | End: 2020-01-23

## 2020-01-23 RX ORDER — OXYCODONE HYDROCHLORIDE AND ACETAMINOPHEN 5; 325 MG/1; MG/1
1 TABLET ORAL EVERY 6 HOURS PRN
Qty: 15 TABLET | Refills: 0 | Status: SHIPPED | OUTPATIENT
Start: 2020-01-23 | End: 2020-02-02

## 2020-01-23 RX ORDER — METHOCARBAMOL 500 MG/1
500 TABLET, FILM COATED ORAL EVERY 6 HOURS PRN
Qty: 10 TABLET | Refills: 0 | Status: SHIPPED | OUTPATIENT
Start: 2020-01-23 | End: 2020-09-02 | Stop reason: HOSPADM

## 2020-01-23 RX ORDER — OXYCODONE HYDROCHLORIDE AND ACETAMINOPHEN 5; 325 MG/1; MG/1
1 TABLET ORAL EVERY 6 HOURS PRN
Qty: 15 TABLET | Refills: 0
Start: 2020-01-23 | End: 2020-01-23

## 2020-01-23 RX ADMIN — OXYCODONE HYDROCHLORIDE AND ACETAMINOPHEN 2 TABLET: 5; 325 TABLET ORAL at 01:18

## 2020-01-23 RX ADMIN — OXYCODONE HYDROCHLORIDE AND ACETAMINOPHEN 1 TABLET: 5; 325 TABLET ORAL at 12:51

## 2020-01-23 RX ADMIN — HEPARIN SODIUM 5000 UNITS: 5000 INJECTION INTRAVENOUS; SUBCUTANEOUS at 06:27

## 2020-01-23 RX ADMIN — OXYCODONE HYDROCHLORIDE AND ACETAMINOPHEN 1 TABLET: 5; 325 TABLET ORAL at 06:26

## 2020-01-23 RX ADMIN — KETOROLAC TROMETHAMINE 15 MG: 30 INJECTION, SOLUTION INTRAMUSCULAR; INTRAVENOUS at 06:27

## 2020-01-23 RX ADMIN — KETOROLAC TROMETHAMINE 15 MG: 30 INJECTION, SOLUTION INTRAMUSCULAR; INTRAVENOUS at 00:20

## 2020-01-23 NOTE — NURSING NOTE
Pt has prescriptions that were given to her by the doctor  Pt IV was removed and dry dressing applied  Pt dressed self and binder in place  Pt  was in the room at the time of discard and her friend came to drive her home  Pt discharge instructions were reviewed with the pt  Pt was brought to the lobby via wheelchair

## 2020-01-27 ENCOUNTER — TELEPHONE (OUTPATIENT)
Dept: SURGERY | Facility: CLINIC | Age: 50
End: 2020-01-27

## 2020-01-27 NOTE — DISCHARGE SUMMARY
Discharge Summary - Helio Stevens 44 y o  female MRN: [id number]    Unit/Bed#: -02 Encounter: [id number]    Admission Date:   Admission Orders (From admission, onward)     Ordered        01/21/20 1448  Place in Observation  Once                     Admitting Diagnosis: Incisional hernia, without obstruction or gangrene [K43 2]     HPI: Pleasant 43 yo F admitted after elective laparoscopic incisional hernia repair with mesh  Procedures Performed: No orders of the defined types were placed in this encounter  Summary of Hospital Course: Patient underwent uncomplicated, elective laparoscopic incisional hernia repair with mesh with Dr Hilda Rodriguez on 01/21/2020  Post-op she had significant abdominal pain for which she was kept in the hospital for pain control  Her pain improved and she remained hemodynamically stable  She was examined on 01/23/2020 and determined to be stable for discharge  Significant Findings, Care, Treatment and Services Provided: Laparoscopic incisional hernia repair with mesh    Complications: None    Discharge Diagnosis: Incisional hernia    Resolved Problems  Date Reviewed: 1/23/2020          Resolved    * (Principal) Incisional hernia, without obstruction or gangrene 1/21/2020     Resolved by  Liza Weber PA-C          Condition at Discharge: stable         Discharge instructions/Information to patient and family:   See after visit summary for information provided to patient and family  Provisions for Follow-Up Care:  See after visit summary for information related to follow-up care and any pertinent home health orders  PCP: Racquel Espinosa DO    Disposition: Home    Planned Readmission: No      Discharge Statement   I spent 15 minutes discharging the patient  This time was spent on the day of discharge  I had direct contact with the patient on the day of discharge  Additional documentation is required if more than 30 minutes were spent on discharge  Discharge Medications:  See after visit summary for reconciled discharge medications provided to patient and family

## 2020-01-27 NOTE — TELEPHONE ENCOUNTER
I spoke to the patient by phone today  I addressed all questions and concerns regarding pain management and her constipation  I have advised continuing to use MiraLax on a daily basis  I recommended adding milk of magnesia 30 mL p o  Daily  If this fails to resolve her constipation in 2 days I have advised a dose of magnesium citrate  With regards to pain management I have advised her to alternate between ibuprofen and Tylenol and to avoid the narcotics     ----- Message from [facility] sent at 1/27/2020  7:43 AM EST -----  Regarding: Pre-Op/Post-Op Question  Contact: [phone]  Could you please reach out to the patient      ----- Message -----  From: Pallavi Alexander  Sent: 1/26/2020  11:04 PM EST  To: [facility] [place] [facility]  Subject: Pre-Op/Post-Op Question                          Hi Dr Alessandra Villalobos,    It's Sunday night and I'm starting to feel feverish (99 3)  This prompted Wm Bennett and I to look at my incisions  I don't remember my incision turning yellow from my first surgery so I thought I should send you pictures to make sure it's healing properly  All 3 incisions on my left side have turned a little yellow around the incision site vs the incisions on my right side  I have also been very constipated  I wasn't able to take my supplements until I got home (2 days after surgery)  As I told everyone at the hospital, I didn't poop at all while I was there  I started taking my magnesium (natural stool softener) on Thursday evening, twice Friday, twice Saturday, once Sunday before I switched to Miralax on Sunday evening  I did poop a little on Friday morning, Saturday morning and twice on Sunday but it's been extremely hard to go  My hope is that it will be better Monday morning because of the Miralax Sunday evening      Thanks,  Vick Payment

## 2020-02-05 ENCOUNTER — OFFICE VISIT (OUTPATIENT)
Dept: SURGERY | Facility: CLINIC | Age: 50
End: 2020-02-05

## 2020-02-05 DIAGNOSIS — K43.2 INCISIONAL HERNIA, WITHOUT OBSTRUCTION OR GANGRENE: Primary | ICD-10-CM

## 2020-02-05 PROCEDURE — 99024 POSTOP FOLLOW-UP VISIT: CPT | Performed by: SURGERY

## 2020-02-05 NOTE — PROGRESS NOTES
Assessment/Plan:    Incisional Hernia  Patient is a 45 y/o female status post laparoscopic incisional hernia repair on 1/21/20  Patient recovering well  Patient's  accompanied her to this visit  Patient admits that she is pain-free and is no longer taking any medications at home for pain  Patient admits to tolerating a full diet without nausea or vomiting  Patient admits to passing flatus and having normal bowel movements  Patient states that she is still wearing the abdominal binder for comfort  Patient's only concern is about the full sensation that she intermittently experiences of the abdomen  Patient is AAOx3  Well-developed, cooperative, and reliable historian  Sitting on exam table in no acute distress  On physical exam patient's abdomen is flat and soft  Normal active bowel sounds in all four quadrants  Non-tender to palpation and percussion in all four quadrants  Incisions are dry and intact  Skin glue is still present  Patient's concern regarding the full-sensation in her abdomen was explained to her and her 's satisfaction  Patient was told to follow up with the office in one month  Patient understands and agrees with the plan  Subjective:      Patient ID: Brunilda Chiang is a 44 y o  female  HPI    The following portions of the patient's history were reviewed and updated as appropriate: allergies, current medications, past family history, past medical history, past social history, past surgical history and problem list     Review of Systems   Constitutional: Negative for activity change, fatigue and fever  HENT: Negative  Eyes: Negative  Respiratory: Negative for cough, shortness of breath and wheezing  Cardiovascular: Negative for chest pain and palpitations  Gastrointestinal: Negative for abdominal pain, diarrhea and vomiting  Endocrine: Negative  Genitourinary: Negative  Musculoskeletal: Negative  Skin: Negative      Neurological: Negative for dizziness, syncope and light-headedness  Hematological: Negative  Psychiatric/Behavioral: Negative  Objective:      LMP 01/02/2019 (LMP Unknown)          Physical Exam   Constitutional: She is oriented to person, place, and time  She appears well-developed and well-nourished  HENT:   Head: Normocephalic and atraumatic  Eyes: Pupils are equal, round, and reactive to light  Conjunctivae are normal    Neck: Normal range of motion  Neck supple  Cardiovascular: Normal rate, regular rhythm and normal heart sounds  Pulmonary/Chest: Effort normal and breath sounds normal    Abdominal:   Abdomen is flat and soft  Normal active bowel sounds in all four quadrants  Non-tender to palpation and percussion in all four quadrants  Incisions are dry and intact  Skin glue is still present  Musculoskeletal: Normal range of motion  Neurological: She is alert and oriented to person, place, and time  Skin: Skin is warm and dry  Capillary refill takes less than 2 seconds  Psychiatric: Her behavior is normal  Judgment and thought content normal    Nursing note and vitals reviewed

## 2020-02-05 NOTE — ASSESSMENT & PLAN NOTE
Patient is well status post laparoscopic incisional herniorrhaphy with mesh  The operative findings and technical details of the surgery explained to the patient the presence of her  Missael Crow  Patient reports significant improvement in her pain over the past week  On physical examination she is awake alert oriented  She is competent reliable  The surgical wounds are clean dry and intact  No evidence of early recurrent hernia formation  Patient appears well status post laparoscopic incisional herniorrhaphy with mesh  I have advised continued slow increase in her level of activity and I look for to seeing her back in a month's time or sooner on an as-needed basis

## 2020-02-05 NOTE — LETTER
February 5, 2020     Berlin Overton DO  [address] [place] [address]   [address]  [facility] [address]    Patient: Tushar Mercado   YOB: 1970   Date of Visit: 2/5/2020       Dear Dr Bertin Aguilar:    Thank you for referring Achilles Link to me for evaluation  Below are my notes for this consultation  If you have questions, please do not hesitate to call me  I look forward to following your patient along with you  Sincerely,        Anamika Yates MD        CC: No Recipients  Anamika Yates MD  2/5/2020 11:43 AM  Incomplete  Assessment/Plan:    Incisional hernia, without obstruction or gangrene  Patient is well status post laparoscopic incisional herniorrhaphy with mesh  The operative findings and technical details of the surgery explained to the patient the presence of her  Aidan Veronica  Patient reports significant improvement in her pain over the past week  On physical examination she is awake alert oriented  She is competent reliable  The surgical wounds are clean dry and intact  No evidence of early recurrent hernia formation  Patient appears well status post laparoscopic incisional herniorrhaphy with mesh  I have advised continued slow increase in her level of activity and I look for to seeing her back in a month's time or sooner on an as-needed basis  Diagnoses and all orders for this visit:    Incisional hernia, without obstruction or gangrene    Other orders  -     MAGNESIUM BISGLYCINATE PO          Subjective:      Patient ID: Tushar Mercado is a 44 y o  female  Patient is here today s/p po lap incisional hernia repair 01-  Patients incision areas are clean,dry and are healing nicely  Patient stated that she is feeling well and does  get some lower abdominal tenderness with twinges    Teena Adamitis,       The following portions of the patient's history were reviewed and updated as appropriate: allergies, current medications, past family history, past medical history, past social history, past surgical history and problem list     Review of Systems   Constitutional: Negative  HENT: Negative  Eyes: Negative  Respiratory: Negative  Cardiovascular: Negative  Gastrointestinal: Negative  Endocrine: Negative  Genitourinary: Negative  Musculoskeletal: Negative  Skin: Negative  Allergic/Immunologic: Negative  Neurological: Negative  Hematological: Negative  Psychiatric/Behavioral: Negative  Objective:      LMP 01/02/2019 (LMP Unknown)          Physical Exam   Constitutional: She is oriented to person, place, and time  She appears well-developed and well-nourished  HENT:   Head: Normocephalic and atraumatic  Eyes: Pupils are equal, round, and reactive to light  Conjunctivae are normal    Neck: Normal range of motion  Neck supple  Cardiovascular: Normal rate and regular rhythm  Pulmonary/Chest: Effort normal and breath sounds normal    Abdominal: Soft  Bowel sounds are normal    Abdomen soft, flat nontender  No evidence of recurrent ventral incisional hernia  Surgical wounds clean dry and intact  Musculoskeletal: Normal range of motion  Neurological: She is alert and oriented to person, place, and time  Skin: Skin is warm and dry  Psychiatric: Her behavior is normal  Judgment and thought content normal          Josephine Lopez  2/5/2020 11:43 AM  Attested  Assessment/Plan:    Incisional Hernia  Patient is a 43 y/o female status post laparoscopic incisional hernia repair on 1/21/20  Patient recovering well  Patient's  accompanied her to this visit  Patient admits that she is pain-free and is no longer taking any medications at home for pain  Patient admits to tolerating a full diet without nausea or vomiting  Patient admits to passing flatus and having normal bowel movements  Patient states that she is still wearing the abdominal binder for comfort   Patient's only concern is about the full sensation that she intermittently experiences of the abdomen  Patient is AAOx3  Well-developed, cooperative, and reliable historian  Sitting on exam table in no acute distress  On physical exam patient's abdomen is flat and soft  Normal active bowel sounds in all four quadrants  Non-tender to palpation and percussion in all four quadrants  Incisions are dry and intact  Skin glue is still present  Patient's concern regarding the full-sensation in her abdomen was explained to her and her 's satisfaction  Patient was told to follow up with the office in one month  Patient understands and agrees with the plan  Subjective:      Patient ID: Skip Awan is a 44 y o  female  HPI    The following portions of the patient's history were reviewed and updated as appropriate: allergies, current medications, past family history, past medical history, past social history, past surgical history and problem list     Review of Systems   Constitutional: Negative for activity change, fatigue and fever  HENT: Negative  Eyes: Negative  Respiratory: Negative for cough, shortness of breath and wheezing  Cardiovascular: Negative for chest pain and palpitations  Gastrointestinal: Negative for abdominal pain, diarrhea and vomiting  Endocrine: Negative  Genitourinary: Negative  Musculoskeletal: Negative  Skin: Negative  Neurological: Negative for dizziness, syncope and light-headedness  Hematological: Negative  Psychiatric/Behavioral: Negative  Objective:      LMP 01/02/2019 (LMP Unknown)          Physical Exam   Constitutional: She is oriented to person, place, and time  She appears well-developed and well-nourished  HENT:   Head: Normocephalic and atraumatic  Eyes: Pupils are equal, round, and reactive to light  Conjunctivae are normal    Neck: Normal range of motion  Neck supple  Cardiovascular: Normal rate, regular rhythm and normal heart sounds     Pulmonary/Chest: Effort normal and breath sounds normal    Abdominal:   Abdomen is flat and soft  Normal active bowel sounds in all four quadrants  Non-tender to palpation and percussion in all four quadrants  Incisions are dry and intact  Skin glue is still present  Musculoskeletal: Normal range of motion  Neurological: She is alert and oriented to person, place, and time  Skin: Skin is warm and dry  Capillary refill takes less than 2 seconds  Psychiatric: Her behavior is normal  Judgment and thought content normal    Nursing note and vitals reviewed  Attestation signed by Vilma Yeh MD at 2/5/2020 11:43 AM:  Attending Physician Statement  I discussed with the student  I agree with the student's documented findings and plan of care

## 2020-02-05 NOTE — PROGRESS NOTES
Assessment/Plan:    Incisional hernia, without obstruction or gangrene  Patient is well status post laparoscopic incisional herniorrhaphy with mesh  The operative findings and technical details of the surgery explained to the patient the presence of her  Naseem Rubio  Patient reports significant improvement in her pain over the past week  On physical examination she is awake alert oriented  She is competent reliable  The surgical wounds are clean dry and intact  No evidence of early recurrent hernia formation  Patient appears well status post laparoscopic incisional herniorrhaphy with mesh  I have advised continued slow increase in her level of activity and I look for to seeing her back in a month's time or sooner on an as-needed basis  Diagnoses and all orders for this visit:    Incisional hernia, without obstruction or gangrene    Other orders  -     MAGNESIUM BISGLYCINATE PO          Subjective:      Patient ID: Andrzej Lyles is a 44 y o  female  Patient is here today s/p po lap incisional hernia repair 01-  Patients incision areas are clean,dry and are healing nicely  Patient stated that she is feeling well and does  get some lower abdominal tenderness with twinges  Teena Adamitis,       The following portions of the patient's history were reviewed and updated as appropriate: allergies, current medications, past family history, past medical history, past social history, past surgical history and problem list     Review of Systems   Constitutional: Negative  HENT: Negative  Eyes: Negative  Respiratory: Negative  Cardiovascular: Negative  Gastrointestinal: Negative  Endocrine: Negative  Genitourinary: Negative  Musculoskeletal: Negative  Skin: Negative  Allergic/Immunologic: Negative  Neurological: Negative  Hematological: Negative  Psychiatric/Behavioral: Negative            Objective:      LMP 01/02/2019 (LMP Unknown)          Physical Exam Constitutional: She is oriented to person, place, and time  She appears well-developed and well-nourished  HENT:   Head: Normocephalic and atraumatic  Eyes: Pupils are equal, round, and reactive to light  Conjunctivae are normal    Neck: Normal range of motion  Neck supple  Cardiovascular: Normal rate and regular rhythm  Pulmonary/Chest: Effort normal and breath sounds normal    Abdominal: Soft  Bowel sounds are normal    Abdomen soft, flat nontender  No evidence of recurrent ventral incisional hernia  Surgical wounds clean dry and intact  Musculoskeletal: Normal range of motion  Neurological: She is alert and oriented to person, place, and time  Skin: Skin is warm and dry     Psychiatric: Her behavior is normal  Judgment and thought content normal

## 2020-02-14 ENCOUNTER — APPOINTMENT (OUTPATIENT)
Dept: LAB | Facility: HOSPITAL | Age: 50
End: 2020-02-14
Attending: FAMILY MEDICINE
Payer: COMMERCIAL

## 2020-02-14 DIAGNOSIS — E78.5 DYSLIPIDEMIA: ICD-10-CM

## 2020-02-14 DIAGNOSIS — R73.01 IMPAIRED FASTING GLUCOSE: ICD-10-CM

## 2020-02-14 LAB
ANION GAP SERPL CALCULATED.3IONS-SCNC: 10 MMOL/L (ref 4–13)
BUN SERPL-MCNC: 14 MG/DL (ref 5–25)
CALCIUM SERPL-MCNC: 9.2 MG/DL (ref 8.3–10.1)
CHLORIDE SERPL-SCNC: 100 MMOL/L (ref 100–108)
CHOLEST SERPL-MCNC: 187 MG/DL (ref 50–200)
CO2 SERPL-SCNC: 30 MMOL/L (ref 21–32)
CREAT SERPL-MCNC: 0.73 MG/DL (ref 0.6–1.3)
EST. AVERAGE GLUCOSE BLD GHB EST-MCNC: 117 MG/DL
GFR SERPL CREATININE-BSD FRML MDRD: 96 ML/MIN/1.73SQ M
GLUCOSE P FAST SERPL-MCNC: 103 MG/DL (ref 65–99)
HBA1C MFR BLD: 5.7 %
HDLC SERPL-MCNC: 62 MG/DL
LDLC SERPL CALC-MCNC: 113 MG/DL (ref 0–100)
NONHDLC SERPL-MCNC: 125 MG/DL
POTASSIUM SERPL-SCNC: 3.7 MMOL/L (ref 3.5–5.3)
SODIUM SERPL-SCNC: 140 MMOL/L (ref 136–145)
TRIGL SERPL-MCNC: 62 MG/DL

## 2020-02-14 PROCEDURE — 36415 COLL VENOUS BLD VENIPUNCTURE: CPT

## 2020-02-14 PROCEDURE — 83036 HEMOGLOBIN GLYCOSYLATED A1C: CPT

## 2020-02-14 PROCEDURE — 80061 LIPID PANEL: CPT

## 2020-02-14 PROCEDURE — 80048 BASIC METABOLIC PNL TOTAL CA: CPT

## 2020-02-18 ENCOUNTER — OFFICE VISIT (OUTPATIENT)
Dept: FAMILY MEDICINE CLINIC | Facility: CLINIC | Age: 50
End: 2020-02-18
Payer: COMMERCIAL

## 2020-02-18 ENCOUNTER — TELEPHONE (OUTPATIENT)
Dept: ADMINISTRATIVE | Facility: OTHER | Age: 50
End: 2020-02-18

## 2020-02-18 VITALS
HEART RATE: 74 BPM | WEIGHT: 118.2 LBS | DIASTOLIC BLOOD PRESSURE: 80 MMHG | SYSTOLIC BLOOD PRESSURE: 132 MMHG | OXYGEN SATURATION: 98 % | BODY MASS INDEX: 20.94 KG/M2 | HEIGHT: 63 IN

## 2020-02-18 DIAGNOSIS — Z00.01 ENCOUNTER FOR GENERAL ADULT MEDICAL EXAMINATION WITH ABNORMAL FINDINGS: Primary | ICD-10-CM

## 2020-02-18 DIAGNOSIS — E78.5 DYSLIPIDEMIA: ICD-10-CM

## 2020-02-18 DIAGNOSIS — R73.01 IMPAIRED FASTING GLUCOSE: ICD-10-CM

## 2020-02-18 PROCEDURE — 3008F BODY MASS INDEX DOCD: CPT | Performed by: FAMILY MEDICINE

## 2020-02-18 PROCEDURE — 1036F TOBACCO NON-USER: CPT | Performed by: FAMILY MEDICINE

## 2020-02-18 PROCEDURE — 99396 PREV VISIT EST AGE 40-64: CPT | Performed by: FAMILY MEDICINE

## 2020-02-18 NOTE — PROGRESS NOTES
Assessment/Plan:    Encounter for general adult medical examination with abnormal findings  Patient presents to the office today for her annual physical   Her physical exam was unremarkable  I encourage to continue routine follow-up with Dr Zehra Gross  Impaired fasting glucose  I reviewed with the patient her recent blood work  Her hemoglobin A1c has improved to 5 7  I expect if she continues to watch her diet will be normal when I see her again  Fasting blood glucose was minimally elevated  I advised the patient not to lose any more weight  Her BMI is down to 20 9  Dyslipidemia  Patient has hyperlipidemia  I reviewed her lipid panel  At this point, I recommend we continue to treat her through diet and exercise  I am not recommending statin therapy  I will see the patient back again in 6 months  Diagnoses and all orders for this visit:    Encounter for general adult medical examination with abnormal findings    Impaired fasting glucose  -     Hemoglobin A1C; Future  -     Comprehensive metabolic panel; Future    Dyslipidemia  -     Lipid panel; Future          Subjective:      Patient ID: Irasema Mccoy is a 44 y o  female  This is a 47-year-old white female presents to the office today for her annual physical   The patient is doing well  She had blood work done in anticipation of the visit  She recently underwent a laparoscopic ventral hernia repair  She reports a lot of postop pain and she is hoping she is done with surgeries at this point  She is watching her diet  She is exercising regularly  She feels well overall  The following portions of the patient's history were reviewed and updated as appropriate: allergies, current medications, past family history, past medical history, past social history, past surgical history and problem list     Review of Systems   Cardiovascular: Negative for chest pain, palpitations and leg swelling     Gastrointestinal: Negative for abdominal distention, abdominal pain, blood in stool, constipation, diarrhea, nausea and vomiting  Genitourinary: Negative for pelvic pain  Objective:      /80 (BP Location: Left arm, Patient Position: Sitting, Cuff Size: Adult)   Pulse 74   Ht 5' 3" (1 6 m)   Wt 53 6 kg (118 lb 3 2 oz)   LMP 01/02/2019 (LMP Unknown)   SpO2 98%   BMI 20 94 kg/m²          Physical Exam   Constitutional: She appears well-developed and well-nourished  No distress  HENT:   Head: Normocephalic and atraumatic  Right Ear: External ear normal    Left Ear: External ear normal    Mouth/Throat: Oropharynx is clear and moist  No oropharyngeal exudate  Eyes: Pupils are equal, round, and reactive to light  Conjunctivae are normal  No scleral icterus  Neck: Neck supple  No tracheal deviation present  No thyromegaly present  Cardiovascular: Normal rate, regular rhythm and normal heart sounds  Exam reveals no gallop and no friction rub  No murmur heard  Pulmonary/Chest: Effort normal and breath sounds normal  No stridor  No respiratory distress  She has no wheezes  She has no rales  Abdominal: Soft  Bowel sounds are normal  She exhibits no distension and no mass  There is no tenderness  There is no guarding  No hernia  Laparoscopic incisions are healing well  I reassured the patient that there was no sign of recurrent ventral hernia  She was worried about that  Lymphadenopathy:     She has no cervical adenopathy  Vitals reviewed      extremities:  Without cyanosis, clubbing, or edema

## 2020-02-18 NOTE — LETTER
Procedure Request Form: Colonoscopy      Date Requested: /20  Patient: Helio Racer  Patient : 1970   Referring Provider: Racquel Herd, DO        Date of Procedure ______________________________       The above patient has informed us that they have completed their   most recent Colonoscopy at your facility  Please complete   this form and attach all corresponding procedure reports/results  Comments __________________________________________________________  ____________________________________________________________________  ____________________________________________________________________  ____________________________________________________________________    Facility Completing Procedure _________________________________________    Form Completed By (print name) _______________________________________      Signature __________________________________________________________      These reports are needed for  compliance    Please fax this completed form and a copy of the procedure report to our office located at [place] [address] as soon as possible to [phone] anselmo Harding: Phone [phone]    We thank you for your assistance in treating our mutual patient

## 2020-02-18 NOTE — TELEPHONE ENCOUNTER
----- Message from Joe Goldberg MA sent at 2/18/2020 11:15 AM EST -----  Regarding: colon screening [facility]  Contact: [phone]  02/18/20 11:15 AM    Hello, our patient Irasema Mccoy has had CRC: Colonoscopy completed/performed  Please assist in updating the patient chart by making an External outreach to Dr Kristy Cruz facility located in [place]  The date of service is 2019      Thank you,  Joe Goldberg MA  [facility]

## 2020-02-18 NOTE — TELEPHONE ENCOUNTER
Upon review of the In Basket request and the patient's chart, initial outreach has been made via fax, please see Contacts section for details  A second outreach attempt will be made within 3 business days      Thank you  Per Mondragon MA

## 2020-02-19 NOTE — ASSESSMENT & PLAN NOTE
Patient has hyperlipidemia  I reviewed her lipid panel  At this point, I recommend we continue to treat her through diet and exercise  I am not recommending statin therapy  I will see the patient back again in 6 months

## 2020-02-19 NOTE — ASSESSMENT & PLAN NOTE
I reviewed with the patient her recent blood work  Her hemoglobin A1c has improved to 5 7  I expect if she continues to watch her diet will be normal when I see her again  Fasting blood glucose was minimally elevated  I advised the patient not to lose any more weight  Her BMI is down to 20 9

## 2020-02-19 NOTE — ASSESSMENT & PLAN NOTE
Patient presents to the office today for her annual physical   Her physical exam was unremarkable  I encourage to continue routine follow-up with Dr Homa Sepulveda

## 2020-02-25 NOTE — TELEPHONE ENCOUNTER
Upon review of the In Basket request we were able to locate, review, and update the patient chart as requested for CRC: Colonoscopy  Any additional questions or concerns should be emailed to the Practice Liaisons via [email or web address] email, please do not reply via In Basket      Thank you  Steve Brown MA

## 2020-03-04 ENCOUNTER — OFFICE VISIT (OUTPATIENT)
Dept: SURGERY | Facility: CLINIC | Age: 50
End: 2020-03-04

## 2020-03-04 VITALS
RESPIRATION RATE: 16 BRPM | DIASTOLIC BLOOD PRESSURE: 82 MMHG | SYSTOLIC BLOOD PRESSURE: 132 MMHG | HEIGHT: 63 IN | HEART RATE: 74 BPM | BODY MASS INDEX: 20.94 KG/M2

## 2020-03-04 DIAGNOSIS — K43.2 INCISIONAL HERNIA, WITHOUT OBSTRUCTION OR GANGRENE: Primary | ICD-10-CM

## 2020-03-04 PROCEDURE — 99024 POSTOP FOLLOW-UP VISIT: CPT | Performed by: SURGERY

## 2020-03-04 NOTE — PROGRESS NOTES
Assessment/Plan:    Incisional hernia, without obstruction or gangrene  Patient returns for routine follow-up 1 month status post incisional herniorrhaphy performed laparoscopically  Patient reports continued discomfort at the symphysis pubis  She describes what feels like a bulge to her  On physical examination she is awake alert oriented  She is a pleasant competent reliable historian  Inspection and palpation of the abdomen reveals it to be soft and flat  There is no evidence for early recurrent hernia  The intraoperative photographs of the operation were reviewed with the patient  All questions answered to her satisfaction  I have asked her to follow up in 3 months time to re-examine her abdomen and address any additional questions or concerns  Diagnoses and all orders for this visit:    Incisional hernia, without obstruction or gangrene          Subjective:      Patient ID: Maria Fernanda Mukherjee is a 44 y o  female  Patient is here today for a one month follow up s/p lap incisional hernia repair 01-21--2020  Patient stated that she gets a heaviness feeling in her mid abdomina along with a pinching sensation when she urinates  Patient states her bowel moments are normal and is still taking the magnesium  Albino Curlin      The following portions of the patient's history were reviewed and updated as appropriate: allergies, current medications, past family history, past medical history, past social history, past surgical history and problem list     Review of Systems   Constitutional: Negative  HENT: Negative  Eyes: Negative  Respiratory: Negative  Cardiovascular: Negative  Gastrointestinal: Negative  Endocrine: Negative  Genitourinary: Negative  Musculoskeletal: Negative  Skin: Negative  Allergic/Immunologic: Negative  Neurological: Negative  Hematological: Negative  Psychiatric/Behavioral: Negative            Objective:      /82 (BP Location: Left arm, Patient Position: Sitting, Cuff Size: Standard)   Pulse 74   Resp 16   Ht 5' 3" (1 6 m)   LMP 01/02/2019 (LMP Unknown)   BMI 20 94 kg/m²          Physical Exam   Constitutional: She is oriented to person, place, and time  She appears well-developed and well-nourished  HENT:   Head: Normocephalic and atraumatic  Eyes: Pupils are equal, round, and reactive to light  Conjunctivae are normal    Neck: Normal range of motion  Neck supple  Cardiovascular: Normal rate and regular rhythm  Pulmonary/Chest: Effort normal and breath sounds normal    Abdominal: Soft  Bowel sounds are normal    Palpation of the abdomen reveals no evidence of recurrent hernia   Musculoskeletal: Normal range of motion  Neurological: She is alert and oriented to person, place, and time  Skin: Skin is warm and dry     Psychiatric: Her behavior is normal  Judgment and thought content normal

## 2020-03-04 NOTE — LETTER
March 6, 2020     [address]  [place] [address]    Patient: Gricel Howard   YOB: 1970   Date of Visit: 3/4/2020       Dear Dr Edson Ochoa:    Thank you for referring Adelaida Renee to me for evaluation  Below are my notes for this consultation  If you have questions, please do not hesitate to call me  I look forward to following your patient along with you  Sincerely,        Vilma Yeh MD        CC: No Recipients  Vilma Yeh MD  3/6/2020 11:13 AM  Incomplete  Assessment/Plan:    Incisional hernia, without obstruction or gangrene  Patient returns for routine follow-up 1 month status post incisional herniorrhaphy performed laparoscopically  Patient reports continued discomfort at the symphysis pubis  She describes what feels like a bulge to her  On physical examination she is awake alert oriented  She is a pleasant competent reliable historian  Inspection and palpation of the abdomen reveals it to be soft and flat  There is no evidence for early recurrent hernia  The intraoperative photographs of the operation were reviewed with the patient  All questions answered to her satisfaction  I have asked her to follow up in 3 months time to re-examine her abdomen and address any additional questions or concerns  Diagnoses and all orders for this visit:    Incisional hernia, without obstruction or gangrene          Subjective:      Patient ID: Gricel Howard is a 44 y o  female  Patient is here today for a one month follow up s/p lap incisional hernia repair 01-21--2020  Patient stated that she gets a heaviness feeling in her mid abdomina along with a pinching sensation when she urinates  Patient states her bowel moments are normal and is still taking the magnesium    Sandra Ucon      The following portions of the patient's history were reviewed and updated as appropriate: allergies, current medications, past family history, past medical history, past social history, past surgical history and problem list     Review of Systems   Constitutional: Negative  HENT: Negative  Eyes: Negative  Respiratory: Negative  Cardiovascular: Negative  Gastrointestinal: Negative  Endocrine: Negative  Genitourinary: Negative  Musculoskeletal: Negative  Skin: Negative  Allergic/Immunologic: Negative  Neurological: Negative  Hematological: Negative  Psychiatric/Behavioral: Negative  Objective:      /82 (BP Location: Left arm, Patient Position: Sitting, Cuff Size: Standard)   Pulse 74   Resp 16   Ht 5' 3" (1 6 m)   LMP 01/02/2019 (LMP Unknown)   BMI 20 94 kg/m²           Physical Exam   Constitutional: She is oriented to person, place, and time  She appears well-developed and well-nourished  HENT:   Head: Normocephalic and atraumatic  Eyes: Pupils are equal, round, and reactive to light  Conjunctivae are normal    Neck: Normal range of motion  Neck supple  Cardiovascular: Normal rate and regular rhythm  Pulmonary/Chest: Effort normal and breath sounds normal    Abdominal: Soft  Bowel sounds are normal    Palpation of the abdomen reveals no evidence of recurrent hernia   Musculoskeletal: Normal range of motion  Neurological: She is alert and oriented to person, place, and time  Skin: Skin is warm and dry     Psychiatric: Her behavior is normal  Judgment and thought content normal

## 2020-03-06 NOTE — ASSESSMENT & PLAN NOTE
Patient returns for routine follow-up 1 month status post incisional herniorrhaphy performed laparoscopically  Patient reports continued discomfort at the symphysis pubis  She describes what feels like a bulge to her  On physical examination she is awake alert oriented  She is a pleasant competent reliable historian  Inspection and palpation of the abdomen reveals it to be soft and flat  There is no evidence for early recurrent hernia  The intraoperative photographs of the operation were reviewed with the patient  All questions answered to her satisfaction  I have asked her to follow up in 3 months time to re-examine her abdomen and address any additional questions or concerns  Adequate: hears normal conversation without difficulty

## 2020-04-14 ENCOUNTER — TELEPHONE (OUTPATIENT)
Dept: SURGICAL ONCOLOGY | Facility: CLINIC | Age: 50
End: 2020-04-14

## 2020-05-21 ENCOUNTER — APPOINTMENT (OUTPATIENT)
Dept: LAB | Facility: HOSPITAL | Age: 50
End: 2020-05-21
Attending: SURGERY
Payer: COMMERCIAL

## 2020-05-21 DIAGNOSIS — R73.01 IMPAIRED FASTING GLUCOSE: ICD-10-CM

## 2020-05-21 DIAGNOSIS — R16.0 LIVER MASS: ICD-10-CM

## 2020-05-21 LAB — CANCER AG125 SERPL-ACNC: 5 U/ML (ref 0–30)

## 2020-05-21 PROCEDURE — 86304 IMMUNOASSAY TUMOR CA 125: CPT

## 2020-05-21 PROCEDURE — 36415 COLL VENOUS BLD VENIPUNCTURE: CPT

## 2020-05-22 ENCOUNTER — TELEPHONE (OUTPATIENT)
Dept: GYNECOLOGIC ONCOLOGY | Facility: CLINIC | Age: 50
End: 2020-05-22

## 2020-05-27 ENCOUNTER — OFFICE VISIT (OUTPATIENT)
Dept: GYNECOLOGIC ONCOLOGY | Facility: CLINIC | Age: 50
End: 2020-05-27
Payer: COMMERCIAL

## 2020-05-27 VITALS
HEART RATE: 69 BPM | WEIGHT: 123 LBS | DIASTOLIC BLOOD PRESSURE: 80 MMHG | BODY MASS INDEX: 23.22 KG/M2 | TEMPERATURE: 98.5 F | HEIGHT: 61 IN | SYSTOLIC BLOOD PRESSURE: 130 MMHG

## 2020-05-27 DIAGNOSIS — C56.2 MALIGNANT NEOPLASM OF LEFT OVARY (HCC): Primary | ICD-10-CM

## 2020-05-27 PROCEDURE — 3008F BODY MASS INDEX DOCD: CPT | Performed by: OBSTETRICS & GYNECOLOGY

## 2020-05-27 PROCEDURE — 1036F TOBACCO NON-USER: CPT | Performed by: OBSTETRICS & GYNECOLOGY

## 2020-05-27 PROCEDURE — 99214 OFFICE O/P EST MOD 30 MIN: CPT | Performed by: OBSTETRICS & GYNECOLOGY

## 2020-05-29 ENCOUNTER — TELEPHONE (OUTPATIENT)
Dept: SURGERY | Facility: CLINIC | Age: 50
End: 2020-05-29

## 2020-06-10 ENCOUNTER — APPOINTMENT (OUTPATIENT)
Dept: LAB | Facility: HOSPITAL | Age: 50
End: 2020-06-10
Attending: SURGERY
Payer: COMMERCIAL

## 2020-06-10 DIAGNOSIS — R16.0 LIVER MASS: ICD-10-CM

## 2020-06-10 LAB
BUN SERPL-MCNC: 12 MG/DL (ref 5–25)
CREAT SERPL-MCNC: 0.68 MG/DL (ref 0.6–1.3)
GFR SERPL CREATININE-BSD FRML MDRD: 101 ML/MIN/1.73SQ M

## 2020-06-10 PROCEDURE — 36415 COLL VENOUS BLD VENIPUNCTURE: CPT

## 2020-06-10 PROCEDURE — 82565 ASSAY OF CREATININE: CPT

## 2020-06-10 PROCEDURE — 84520 ASSAY OF UREA NITROGEN: CPT

## 2020-06-15 ENCOUNTER — HOSPITAL ENCOUNTER (OUTPATIENT)
Dept: MRI IMAGING | Facility: HOSPITAL | Age: 50
Discharge: HOME | End: 2020-06-15
Attending: SURGERY
Payer: COMMERCIAL

## 2020-06-15 DIAGNOSIS — R16.0 LIVER MASS: ICD-10-CM

## 2020-06-15 PROCEDURE — 74183 MRI ABD W/O CNTR FLWD CNTR: CPT

## 2020-06-15 PROCEDURE — A9581 GADOXETATE DISODIUM INJ: HCPCS | Performed by: SURGERY

## 2020-06-15 RX ADMIN — GADOXETATE DISODIUM 5 ML: 181.43 INJECTION, SOLUTION INTRAVENOUS at 14:02

## 2020-06-18 ENCOUNTER — OFFICE VISIT (OUTPATIENT)
Dept: SURGERY | Facility: CLINIC | Age: 50
End: 2020-06-18
Payer: COMMERCIAL

## 2020-06-18 DIAGNOSIS — K43.2 INCISIONAL HERNIA, WITHOUT OBSTRUCTION OR GANGRENE: Primary | ICD-10-CM

## 2020-06-18 DIAGNOSIS — K76.89 FOCAL NODULAR HYPERPLASIA OF LIVER: ICD-10-CM

## 2020-06-18 PROCEDURE — 99213 OFFICE O/P EST LOW 20 MIN: CPT | Performed by: SURGERY

## 2020-06-18 PROCEDURE — 1036F TOBACCO NON-USER: CPT | Performed by: SURGERY

## 2020-06-22 ENCOUNTER — TELEPHONE (OUTPATIENT)
Dept: SURGICAL ONCOLOGY | Facility: CLINIC | Age: 50
End: 2020-06-22

## 2020-06-23 ENCOUNTER — OFFICE VISIT (OUTPATIENT)
Dept: SURGICAL ONCOLOGY | Facility: CLINIC | Age: 50
End: 2020-06-23
Payer: COMMERCIAL

## 2020-06-23 VITALS
TEMPERATURE: 97.6 F | WEIGHT: 123.5 LBS | HEIGHT: 61 IN | DIASTOLIC BLOOD PRESSURE: 84 MMHG | SYSTOLIC BLOOD PRESSURE: 122 MMHG | HEART RATE: 95 BPM | BODY MASS INDEX: 23.32 KG/M2

## 2020-06-23 DIAGNOSIS — K76.89 HEPATIC CYST: Primary | ICD-10-CM

## 2020-06-23 DIAGNOSIS — K76.89 FOCAL NODULAR HYPERPLASIA OF LIVER: ICD-10-CM

## 2020-06-23 PROCEDURE — 3008F BODY MASS INDEX DOCD: CPT | Performed by: SURGERY

## 2020-06-23 PROCEDURE — 99213 OFFICE O/P EST LOW 20 MIN: CPT | Performed by: SURGERY

## 2020-06-23 PROCEDURE — 1036F TOBACCO NON-USER: CPT | Performed by: SURGERY

## 2020-08-24 ENCOUNTER — APPOINTMENT (OUTPATIENT)
Dept: LAB | Facility: HOSPITAL | Age: 50
End: 2020-08-24
Attending: FAMILY MEDICINE
Payer: COMMERCIAL

## 2020-08-24 DIAGNOSIS — C56.2 MALIGNANT NEOPLASM OF LEFT OVARY (HCC): ICD-10-CM

## 2020-08-24 DIAGNOSIS — R73.01 IMPAIRED FASTING GLUCOSE: ICD-10-CM

## 2020-08-24 DIAGNOSIS — E78.5 DYSLIPIDEMIA: ICD-10-CM

## 2020-08-24 LAB
ALBUMIN SERPL BCP-MCNC: 4 G/DL (ref 3.5–5)
ALP SERPL-CCNC: 42 U/L (ref 46–116)
ALT SERPL W P-5'-P-CCNC: 30 U/L (ref 12–78)
ANION GAP SERPL CALCULATED.3IONS-SCNC: 7 MMOL/L (ref 4–13)
AST SERPL W P-5'-P-CCNC: 18 U/L (ref 5–45)
BILIRUB SERPL-MCNC: 0.3 MG/DL (ref 0.2–1)
BUN SERPL-MCNC: 11 MG/DL (ref 5–25)
CALCIUM SERPL-MCNC: 9.2 MG/DL (ref 8.3–10.1)
CANCER AG125 SERPL-ACNC: 5.3 U/ML (ref 0–30)
CHLORIDE SERPL-SCNC: 103 MMOL/L (ref 100–108)
CHOLEST SERPL-MCNC: 208 MG/DL (ref 50–200)
CO2 SERPL-SCNC: 29 MMOL/L (ref 21–32)
CREAT SERPL-MCNC: 0.73 MG/DL (ref 0.6–1.3)
EST. AVERAGE GLUCOSE BLD GHB EST-MCNC: 108 MG/DL
GFR SERPL CREATININE-BSD FRML MDRD: 95 ML/MIN/1.73SQ M
GLUCOSE P FAST SERPL-MCNC: 104 MG/DL (ref 65–99)
HBA1C MFR BLD: 5.4 %
HDLC SERPL-MCNC: 74 MG/DL
LDLC SERPL CALC-MCNC: 124 MG/DL (ref 0–100)
NONHDLC SERPL-MCNC: 134 MG/DL
POTASSIUM SERPL-SCNC: 4.1 MMOL/L (ref 3.5–5.3)
PROT SERPL-MCNC: 7.3 G/DL (ref 6.4–8.2)
SODIUM SERPL-SCNC: 139 MMOL/L (ref 136–145)
TRIGL SERPL-MCNC: 51 MG/DL

## 2020-08-24 PROCEDURE — 36415 COLL VENOUS BLD VENIPUNCTURE: CPT

## 2020-08-24 PROCEDURE — 80053 COMPREHEN METABOLIC PANEL: CPT

## 2020-08-24 PROCEDURE — 86304 IMMUNOASSAY TUMOR CA 125: CPT

## 2020-08-24 PROCEDURE — 83036 HEMOGLOBIN GLYCOSYLATED A1C: CPT

## 2020-08-24 PROCEDURE — 80061 LIPID PANEL: CPT

## 2020-08-26 ENCOUNTER — OFFICE VISIT (OUTPATIENT)
Dept: FAMILY MEDICINE CLINIC | Facility: CLINIC | Age: 50
End: 2020-08-26
Payer: COMMERCIAL

## 2020-08-26 VITALS
TEMPERATURE: 97.6 F | HEART RATE: 64 BPM | SYSTOLIC BLOOD PRESSURE: 126 MMHG | BODY MASS INDEX: 23.83 KG/M2 | WEIGHT: 126.2 LBS | DIASTOLIC BLOOD PRESSURE: 82 MMHG | HEIGHT: 61 IN | OXYGEN SATURATION: 97 %

## 2020-08-26 DIAGNOSIS — C56.2 MALIGNANT NEOPLASM OF LEFT OVARY (HCC): ICD-10-CM

## 2020-08-26 DIAGNOSIS — Z11.4 SCREENING FOR HIV WITHOUT PRESENCE OF RISK FACTORS: ICD-10-CM

## 2020-08-26 DIAGNOSIS — R73.01 IMPAIRED FASTING GLUCOSE: ICD-10-CM

## 2020-08-26 DIAGNOSIS — E78.5 DYSLIPIDEMIA: Primary | ICD-10-CM

## 2020-08-26 PROCEDURE — 3008F BODY MASS INDEX DOCD: CPT | Performed by: SURGERY

## 2020-08-26 PROCEDURE — 99213 OFFICE O/P EST LOW 20 MIN: CPT | Performed by: FAMILY MEDICINE

## 2020-08-26 NOTE — PROGRESS NOTES
Assessment/Plan:    Dyslipidemia  I reviewed with the patient her lipid panel  Her total cholesterol is 2 0 weight  This is increased from 187  Her triglycerides are 51  HDL cholesterol is excellent at 74  LDL cholesterol is 124  This went up from 113  We discussed diet and exercise  I counseled the patient regarding a low-fat/low-cholesterol diet  At this point, I am not going to recommend medication  We will continue to follow this  Repeat labs were ordered which I would like done prior to her next visit  Impaired fasting glucose  Patient's fasting blood sugar was 104  Her hemoglobin A1c has improved  She is 5 4%  Previous was 5 7%  We will continue to monitor her blood sugars as well  Malignant neoplasm of left ovary (HCC)  We discussed her recent consultation with Dr Arina Lewis  I also reviewed with the patient her  which was 5 3  Diagnoses and all orders for this visit:    Dyslipidemia  -     Lipid panel; Future    Screening for HIV without presence of risk factors  -     Cancel: HIV 1/2 Antigen/Antibody (4th Generation) w Reflex [facility]; Future    Malignant neoplasm of left ovary (HCC)  -     ; Future    Impaired fasting glucose  -     Comprehensive metabolic panel; Future  -     Hemoglobin A1C; Future        Patient's foot exam was unremarkable  She may have osteoarthritis of the feet  Today, exam was unremarkable and therefore, I have simply recommend we monitor her  Her symptoms come and go  She is asymptomatic today  Subjective:      Patient ID: Aspen Valenzuela is a 44 y o  female  This patient is a 38-year-old white female who presents to the office today for her routine checkup  Patient did have blood work done in anticipation of the visit  She is doing very well  She is exercising regularly  She is watching her diet  She is staying active from work  She complains of swelling of her foot  She also complains of numbness in the foot    She reports that the numbness comes and goes  The following portions of the patient's history were reviewed and updated as appropriate: allergies, current medications, past family history, past medical history, past social history, past surgical history and problem list     Review of Systems   Constitutional: Negative for activity change, appetite change and unexpected weight change  Respiratory: Negative for cough, shortness of breath and wheezing  Cardiovascular: Negative for chest pain, palpitations and leg swelling  Gastrointestinal: Negative for abdominal distention, abdominal pain, blood in stool, constipation, diarrhea, nausea and vomiting  Genitourinary: Negative for dysuria, frequency and urgency  Musculoskeletal: Negative for arthralgias, back pain and myalgias  Objective:      /82 (BP Location: Left arm, Patient Position: Sitting, Cuff Size: Large)   Pulse 64   Temp 97 6 °F (36 4 °C) (Temporal)   Ht 5' 1" (1 549 m)   Wt 57 2 kg (126 lb 3 2 oz)   LMP 01/02/2019 (LMP Unknown)   SpO2 97%   BMI 23 85 kg/m²          Physical Exam  Vitals signs reviewed  Constitutional:       Comments: This is a 40-year-old white female who appears her stated age  She is well nourished and cooperative  She is in no apparent distress   HENT:      Head: Normocephalic  Right Ear: Tympanic membrane, ear canal and external ear normal       Left Ear: Tympanic membrane, ear canal and external ear normal       Mouth/Throat:      Mouth: Mucous membranes are moist       Pharynx: Oropharynx is clear  No oropharyngeal exudate or posterior oropharyngeal erythema  Eyes:      General: No scleral icterus  Right eye: No discharge  Left eye: No discharge  Conjunctiva/sclera: Conjunctivae normal       Pupils: Pupils are equal, round, and reactive to light  Neck:      Musculoskeletal: Neck supple  Cardiovascular:      Rate and Rhythm: Normal rate and regular rhythm        Heart sounds: Normal heart sounds  No murmur  No friction rub  No gallop  Pulmonary:      Effort: Pulmonary effort is normal  No respiratory distress  Breath sounds: Normal breath sounds  No stridor  No wheezing, rhonchi or rales  Abdominal:      General: Abdomen is flat  Bowel sounds are normal  There is no distension  Palpations: Abdomen is soft  There is no mass  Tenderness: There is no abdominal tenderness  There is no guarding or rebound  Hernia: No hernia is present  Musculoskeletal:      Comments: No joint swelling, tenderness, or erythema  No heat to the touch  Feet were normal in appearance  Lymphadenopathy:      Cervical: No cervical adenopathy  Neurological:      Comments: Sensation of the feet were normal with monofilament test    Psychiatric:         Mood and Affect: Mood normal          Behavior: Behavior normal          Thought Content:  Thought content normal          Judgment: Judgment normal        extremities:  Without cyanosis, clubbing, or edema

## 2020-08-26 NOTE — PATIENT INSTRUCTIONS
Asthma   AMBULATORY CARE:   Asthma  is a lung disease that makes breathing difficult  Chronic inflammation and reactions to triggers narrow the airways in your lungs  Asthma can become life-threatening if it is not managed  Cough-variant asthma  is a type of asthma that causes a dry cough that keeps coming back  A dry cough may be your only symptom, or you may also have chest tightness  These symptoms may be caused by exercise or exposure to odors, allergens, or respiratory tract infections  Cough-variant asthma is treated the same way as typical asthma  Common symptoms include the following:   · Coughing     · Wheezing     · Shortness of breath     · Chest tightness  Seek care immediately if:   · You have severe shortness of breath  · Your lips or nails turn blue or gray  · The skin around your neck and ribs pulls in with each breath  · You have shortness of breath, even after you take your short-term medicine as directed  · Your peak flow numbers are in the red zone of your AAP  Contact your healthcare provider if:   · You run out of medicine before your next refill is due  · Your symptoms get worse  · You need to take more medicine than usual to control your symptoms  · You have questions or concerns about your condition or care  Treatment for asthma  will depend on how severe your asthma is  Medicine may decrease inflammation, open airways, and make it easier to breathe  Medicines may be inhaled, taken as a pill, or injected  Short-term medicines relieve your symptoms quickly  Long-term medicines are used to prevent future attacks  You may also need medicine to help control your allergies  Manage and prevent future asthma attacks:   · Follow your asthma action plan  This is a written plan that you and your healthcare provider create  It explains which medicine you need and when to change doses if necessary   It also explains how you can monitor symptoms and use a peak flow meter  The meter measures how well your lungs are working  · Manage other health conditions , such as allergies, acid reflux, and sleep apnea  · Identify and avoid triggers  These may include pets, dust mites, mold, and cockroaches  · Do not smoke or be around others who smoke  Nicotine and other chemicals in cigarettes and cigars can cause lung damage  Ask your healthcare provider for information if you currently smoke and need help to quit  E-cigarettes or smokeless tobacco still contain nicotine  Talk to your healthcare provider before you use these products  · Ask about the flu vaccine  The flu can make your asthma worse  You may need a yearly flu shot  Follow up with your healthcare provider as directed: You will need to return to make sure your medicine is working and your symptoms are controlled  You may be referred to an asthma or allergy specialist  Bang Sharp may be asked to keep a record of your peak flow values and bring it with you to your appointments  Write down your questions so you remember to ask them during your visits  © 2017 [address] Information is for End User's use only and may not be sold, redistributed or otherwise used for commercial purposes  All illustrations and images included in CareNotes® are the copyrighted property of [employer]  or Lupillo Henriquez  The above information is an  only  It is not intended as medical advice for individual conditions or treatments  Talk to your doctor, nurse or pharmacist before following any medical regimen to see if it is safe and effective for you

## 2020-09-02 ENCOUNTER — OFFICE VISIT (OUTPATIENT)
Dept: GYNECOLOGIC ONCOLOGY | Facility: CLINIC | Age: 50
End: 2020-09-02
Payer: COMMERCIAL

## 2020-09-02 VITALS
BODY MASS INDEX: 23.98 KG/M2 | RESPIRATION RATE: 18 BRPM | SYSTOLIC BLOOD PRESSURE: 130 MMHG | DIASTOLIC BLOOD PRESSURE: 70 MMHG | HEART RATE: 67 BPM | TEMPERATURE: 97.8 F | HEIGHT: 61 IN | WEIGHT: 127 LBS

## 2020-09-02 DIAGNOSIS — Z85.43 HISTORY OF OVARIAN CANCER: ICD-10-CM

## 2020-09-02 DIAGNOSIS — C56.2 MALIGNANT NEOPLASM OF LEFT OVARY (HCC): Primary | ICD-10-CM

## 2020-09-02 PROBLEM — Z11.4 SCREENING FOR HIV WITHOUT PRESENCE OF RISK FACTORS: Status: ACTIVE | Noted: 2020-09-02

## 2020-09-02 PROCEDURE — 99213 OFFICE O/P EST LOW 20 MIN: CPT | Performed by: OBSTETRICS & GYNECOLOGY

## 2020-09-02 PROCEDURE — 1036F TOBACCO NON-USER: CPT | Performed by: OBSTETRICS & GYNECOLOGY

## 2020-09-02 NOTE — LETTER
September 2, 2020     [address]   [address]  [place] [address]    Patient: Cecilia Vargas   YOB: 1970   Date of Visit: 9/2/2020       Dear Dr Radha Tello:    Thank you for referring Stanley Florez to me for evaluation  Below are my notes for this consultation  If you have questions, please do not hesitate to call me  I look forward to following your patient along with you  Sincerely,        Edinson Vieira MD        CC: No Recipients  Edinson Vieira MD  9/2/2020  1:36 PM  Incomplete    Assessment/Plan:    Problem List Items Addressed This Visit        Other    History of ovarian cancer - Primary      Patient is a very pleasant 49-year-old female with a history of 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 status post surgery alone now for follow-up  She has no evidence of recurrence of disease  Her exam is unremarkable her  is in the normal range and she is feeling well  Patient will follow-up in 3 months for repeat evaluation with  or earlier if symptoms warrant  CHIEF COMPLAINT:  Surveillance ovarian cancer      Problem:  Cancer Staging  Malignant neoplasm of left ovary (HCC)  Staging form: Ovary, Fallopian Tube, Primary Peritoneal, AJCC 8th Edition  - Clinical stage from 5/28/2019: FIGO Stage I (cT1, cN0, cM0) - Signed by Edinson Vieira MD on 5/28/2019  - Pathologic stage from 5/28/2019: FIGO Stage IC1, calculated as Stage IC (pT1c1, pN0, cM0) - Signed by Edinson Vieira MD on 5/28/2019        Previous therapy:  Oncology History   History of ovarian cancer   5/17/2019 Initial Diagnosis    Malignant neoplasm of left ovary ([address] )     5/17/2019 Surgery    Patient underwent total abdominal hysterectomy bilateral salpingo oophorectomy radical omentectomy bilateral pelvic lymphadenectomy and staging procedure for 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       5/28/2019 -  Cancer Staged    Staging form: Ovary, Fallopian Tube, Primary Peritoneal, AJCC 8th Edition  - Pathologic stage from 5/28/2019: FIGO Stage IC1, calculated as Stage IC (pT1c1, pN0, cM0) - Signed by Jb Bass MD on 5/28/2019  Histologic grade (G): G3  Histologic grading system: 4 grade system  Residual tumor volume after primary cytoreductive surgery: No gross tumor  Stage used in treatment planning: Yes  National guidelines used in treatment planning: Yes       5/28/2019 -  Cancer Staged    Staging form: Ovary, Fallopian Tube, Primary Peritoneal, AJCC 8th Edition  - Clinical stage from 5/28/2019: FIGO Stage I (cT1, cN0, cM0) - Signed by Jb Bass MD on 5/28/2019  Stage used in treatment planning: Yes  National guidelines used in treatment planning: Yes        Chemotherapy    Carbo platinum area under the curve of 6 paclitaxel 175 milligrams/meter squared for 3-6 cycles  Patient refused treatment           Patient ID: Kiara Art is a 44 y o  female   Patient is very pleasant 22-year-old female with history of stage IC grade 3 clear cell carcinoma of the ovary  Patient was recommended 3-6 cycles of carboplatin paclitaxel as adjuvant therapy but has refused  She presents today in follow-up  She continues to do well  Her  is in the 5 3  She has no complai nt  Today, the patient is doing well  She denies significant abdominal pain, pelvic pain, nausea, vomiting, constipation, diarrhea, fevers, chills, or vaginal bleeding  The following portions of the patient's history were reviewed and updated as appropriate: allergies, current medications, past family history, past social history, past surgical history and problem list     Review of Systems   Constitutional: Negative  HENT: Negative  Eyes: Negative  Respiratory: Negative  Cardiovascular: Negative  Gastrointestinal: Negative  Endocrine: Negative  Genitourinary: Negative  Musculoskeletal: Negative  Skin: Negative  Neurological: Negative  Hematological: Negative  Psychiatric/Behavioral: Negative  Current Outpatient Medications   Medication Sig Dispense Refill    ascorbic acid (VITAMIN C) 250 mg tablet Take 250 mg by mouth daily      Cod Liver Oil (COD LIVER PO) Take 1 Dose by mouth 3 (three) times a week       MAGNESIUM BISGLYCINATE PO       multivitamin (THERAGRAN) TABS Take 1 tablet by mouth daily      OLIVE LEAF PO Take 300 mg by mouth daily      Probiotic Product (PROBIOTIC PO) Take 1 capsule by mouth daily       TURMERIC PO Take by mouth 2 (two) times a day        No current facility-administered medications for this visit  Objective:    Blood pressure 130/70, pulse 67, temperature 97 8 °F (36 6 °C), resp  rate 18, height 5' 1" (1 549 m), weight 57 6 kg (127 lb), last menstrual period 01/02/2019  Body mass index is 24 kg/m²  Body surface area is 1 56 meters squared  Physical Exam  Constitutional:       Appearance: She is well-developed  HENT:      Head: Normocephalic and atraumatic  Neck:      Musculoskeletal: Normal range of motion and neck supple  Thyroid: No thyromegaly  Cardiovascular:      Rate and Rhythm: Normal rate and regular rhythm  Heart sounds: Normal heart sounds  Pulmonary:      Effort: Pulmonary effort is normal       Breath sounds: Normal breath sounds  Abdominal:      General: Bowel sounds are normal       Palpations: Abdomen is soft  Comments: Well healed  incisions  Genitourinary:     Comments: -Normal external female genitalia, normal Bartholin's and [place]'s glands                  -Normal midline urethral meatus   No lesions notes                  -Bladder without fullness mass or tenderness                  -Vagina without lesion or discharge No significant cystocele or rectocele noted                  -Cervix surgically absent                  -Uterus surgically absent                  -Adnexae surgically absent                  - Anus without fissure of lesion    Musculoskeletal: Normal range of motion  Lymphadenopathy:      Cervical: No cervical adenopathy  Skin:     General: Skin is warm and dry  Neurological:      Mental Status: She is alert and oriented to person, place, and time     Psychiatric:         Behavior: Behavior normal          Lab Results   Component Value Date     5 3 08/24/2020     Lab Results   Component Value Date     10/23/2014    K 4 1 08/24/2020     08/24/2020    CO2 29 08/24/2020    ANIONGAP 9 10/23/2014    BUN 11 08/24/2020    CREATININE 0 73 08/24/2020    GLUCOSE 132 10/23/2014    GLUF 104 (H) 08/24/2020    CALCIUM 9 2 08/24/2020    AST 18 08/24/2020    ALT 30 08/24/2020    ALKPHOS 42 (L) 08/24/2020    PROT 8 3 (H) 10/23/2014    BILITOT 0 2 10/23/2014    EGFR 95 08/24/2020     Lab Results   Component Value Date    WBC 7 61 12/24/2019    HGB 13 4 12/24/2019    HCT 39 0 12/24/2019    MCV 92 12/24/2019     12/24/2019     Lab Results   Component Value Date    NEUTROABS 3 99 12/24/2019        Trend:  Lab Results   Component Value Date     5 3 08/24/2020     5 0 05/21/2020     4 3 12/27/2019     4 9 09/16/2019     45 4 (H) 05/10/2019

## 2020-09-02 NOTE — ASSESSMENT & PLAN NOTE
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

## 2020-09-02 NOTE — ASSESSMENT & PLAN NOTE
We discussed her recent consultation with Dr Yessica Pyle  I also reviewed with the patient her  which was 5 3

## 2020-09-02 NOTE — PROGRESS NOTES
Assessment/Plan:    Problem List Items Addressed This Visit        Other    History of ovarian cancer - Primary      Patient is a very pleasant 47-year-old female with a history of 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 status post surgery alone now for follow-up  She has no evidence of recurrence of disease  Her exam is unremarkable her  is in the normal range and she is feeling well  Patient will follow-up in 3 months for repeat evaluation with  or earlier if symptoms warrant  CHIEF COMPLAINT:  Surveillance ovarian cancer      Problem:  Cancer Staging  Malignant neoplasm of left ovary (HCC)  Staging form: Ovary, Fallopian Tube, Primary Peritoneal, AJCC 8th Edition  - Clinical stage from 5/28/2019: FIGO Stage I (cT1, cN0, cM0) - Signed by Daniel Muller MD on 5/28/2019  - Pathologic stage from 5/28/2019: FIGO Stage IC1, calculated as Stage IC (pT1c1, pN0, cM0) - Signed by Daniel Muller MD on 5/28/2019        Previous therapy:  Oncology History   History of ovarian cancer   5/17/2019 Initial Diagnosis    Malignant neoplasm of left ovary ([address] )     5/17/2019 Surgery    Patient underwent total abdominal hysterectomy bilateral salpingo oophorectomy radical omentectomy bilateral pelvic lymphadenectomy and staging procedure for 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       5/28/2019 -  Cancer Staged    Staging form: Ovary, Fallopian Tube, Primary Peritoneal, AJCC 8th Edition  - Pathologic stage from 5/28/2019: FIGO Stage IC1, calculated as Stage IC (pT1c1, pN0, cM0) - Signed by Daniel Muller MD on 5/28/2019  Histologic grade (G): G3  Histologic grading system: 4 grade system  Residual tumor volume after primary cytoreductive surgery: No gross tumor  Stage used in treatment planning: Yes  National guidelines used in treatment planning: Yes       5/28/2019 -  Cancer Staged    Staging form: Ovary, Fallopian Tube, Primary Peritoneal, AJCC 8th Edition  - Clinical stage from 5/28/2019: FIGO Stage I (cT1, cN0, cM0) - Signed by Krupa Silva MD on 5/28/2019  Stage used in treatment planning: Yes  National guidelines used in treatment planning: Yes        Chemotherapy    Carbo platinum area under the curve of 6 paclitaxel 175 milligrams/meter squared for 3-6 cycles  Patient refused treatment           Patient ID: Irma Sanchez is a 44 y o  female   Patient is very pleasant 24-year-old female with history of stage IC grade 3 clear cell carcinoma of the ovary  Patient was recommended 3-6 cycles of carboplatin paclitaxel as adjuvant therapy but has refused  She presents today in follow-up  She continues to do well  Her  is in the 5 3  She has no complai nt  Today, the patient is doing well  She denies significant abdominal pain, pelvic pain, nausea, vomiting, constipation, diarrhea, fevers, chills, or vaginal bleeding  The following portions of the patient's history were reviewed and updated as appropriate: allergies, current medications, past family history, past social history, past surgical history and problem list     Review of Systems   Constitutional: Negative  HENT: Negative  Eyes: Negative  Respiratory: Negative  Cardiovascular: Negative  Gastrointestinal: Negative  Endocrine: Negative  Genitourinary: Negative  Musculoskeletal: Negative  Skin: Negative  Neurological: Negative  Hematological: Negative  Psychiatric/Behavioral: Negative          Current Outpatient Medications   Medication Sig Dispense Refill    ascorbic acid (VITAMIN C) 250 mg tablet Take 250 mg by mouth daily      Cod Liver Oil (COD LIVER PO) Take 1 Dose by mouth 3 (three) times a week       MAGNESIUM BISGLYCINATE PO       multivitamin (THERAGRAN) TABS Take 1 tablet by mouth daily      OLIVE LEAF PO Take 300 mg by mouth daily      Probiotic Product (PROBIOTIC PO) Take 1 capsule by mouth daily       TURMERIC PO Take by mouth 2 (two) times a day        No current facility-administered medications for this visit  Objective:    Blood pressure 130/70, pulse 67, temperature 97 8 °F (36 6 °C), resp  rate 18, height 5' 1" (1 549 m), weight 57 6 kg (127 lb), last menstrual period 01/02/2019  Body mass index is 24 kg/m²  Body surface area is 1 56 meters squared  Physical Exam  Constitutional:       Appearance: She is well-developed  HENT:      Head: Normocephalic and atraumatic  Neck:      Musculoskeletal: Normal range of motion and neck supple  Thyroid: No thyromegaly  Cardiovascular:      Rate and Rhythm: Normal rate and regular rhythm  Heart sounds: Normal heart sounds  Pulmonary:      Effort: Pulmonary effort is normal       Breath sounds: Normal breath sounds  Abdominal:      General: Bowel sounds are normal       Palpations: Abdomen is soft  Comments: Well healed  incisions  Genitourinary:     Comments: -Normal external female genitalia, normal Bartholin's and Larson's glands                  -Normal midline urethral meatus  No lesions notes                  -Bladder without fullness mass or tenderness                  -Vagina without lesion or discharge No significant cystocele or rectocele noted                  -Cervix surgically absent                  -Uterus surgically absent                  -Adnexae surgically absent                  - Anus without fissure of lesion    Musculoskeletal: Normal range of motion  Lymphadenopathy:      Cervical: No cervical adenopathy  Skin:     General: Skin is warm and dry  Neurological:      Mental Status: She is alert and oriented to person, place, and time     Psychiatric:         Behavior: Behavior normal          Lab Results   Component Value Date     5 3 08/24/2020     Lab Results   Component Value Date     10/23/2014    K 4 1 08/24/2020     08/24/2020    CO2 29 08/24/2020    ANIONGAP 9 10/23/2014    BUN 11 08/24/2020    CREATININE 0 73 08/24/2020    GLUCOSE 132 10/23/2014    GLUF 104 (H) 08/24/2020    CALCIUM 9 2 08/24/2020    AST 18 08/24/2020    ALT 30 08/24/2020    ALKPHOS 42 (L) 08/24/2020    PROT 8 3 (H) 10/23/2014    BILITOT 0 2 10/23/2014    EGFR 95 08/24/2020     Lab Results   Component Value Date    WBC 7 61 12/24/2019    HGB 13 4 12/24/2019    HCT 39 0 12/24/2019    MCV 92 12/24/2019     12/24/2019     Lab Results   Component Value Date    NEUTROABS 3 99 12/24/2019        Trend:  Lab Results   Component Value Date     5 3 08/24/2020     5 0 05/21/2020     4 3 12/27/2019     4 9 09/16/2019     45 4 (H) 05/10/2019

## 2020-09-02 NOTE — ASSESSMENT & PLAN NOTE
Patient's fasting blood sugar was 104  Her hemoglobin A1c has improved  She is 5 4%  Previous was 5 7%  We will continue to monitor her blood sugars as well

## 2020-09-02 NOTE — ASSESSMENT & PLAN NOTE
I reviewed with the patient her lipid panel  Her total cholesterol is 2 0 weight  This is increased from 187  Her triglycerides are 51  HDL cholesterol is excellent at 74  LDL cholesterol is 124  This went up from 113  We discussed diet and exercise  I counseled the patient regarding a low-fat/low-cholesterol diet  At this point, I am not going to recommend medication  We will continue to follow this  Repeat labs were ordered which I would like done prior to her next visit

## 2020-11-30 ENCOUNTER — APPOINTMENT (OUTPATIENT)
Dept: LAB | Facility: HOSPITAL | Age: 50
End: 2020-11-30
Payer: COMMERCIAL

## 2020-11-30 DIAGNOSIS — C56.2 MALIGNANT NEOPLASM OF LEFT OVARY (HCC): ICD-10-CM

## 2020-11-30 LAB — CANCER AG125 SERPL-ACNC: 6.3 U/ML (ref 0–30)

## 2020-11-30 PROCEDURE — 36415 COLL VENOUS BLD VENIPUNCTURE: CPT

## 2020-11-30 PROCEDURE — 86304 IMMUNOASSAY TUMOR CA 125: CPT

## 2020-12-02 ENCOUNTER — OFFICE VISIT (OUTPATIENT)
Dept: GYNECOLOGIC ONCOLOGY | Facility: CLINIC | Age: 50
End: 2020-12-02
Payer: COMMERCIAL

## 2020-12-02 VITALS
RESPIRATION RATE: 18 BRPM | HEART RATE: 68 BPM | TEMPERATURE: 98.2 F | HEIGHT: 61 IN | SYSTOLIC BLOOD PRESSURE: 120 MMHG | BODY MASS INDEX: 24.55 KG/M2 | DIASTOLIC BLOOD PRESSURE: 90 MMHG | WEIGHT: 130 LBS

## 2020-12-02 DIAGNOSIS — Z85.43 PERSONAL HISTORY OF OVARIAN CANCER: Primary | ICD-10-CM

## 2020-12-02 DIAGNOSIS — Z85.43 HISTORY OF OVARIAN CANCER: ICD-10-CM

## 2020-12-02 DIAGNOSIS — Z12.31 BREAST CANCER SCREENING BY MAMMOGRAM: ICD-10-CM

## 2020-12-02 PROCEDURE — 3008F BODY MASS INDEX DOCD: CPT | Performed by: OBSTETRICS & GYNECOLOGY

## 2020-12-02 PROCEDURE — 1036F TOBACCO NON-USER: CPT | Performed by: OBSTETRICS & GYNECOLOGY

## 2020-12-02 PROCEDURE — 99214 OFFICE O/P EST MOD 30 MIN: CPT | Performed by: OBSTETRICS & GYNECOLOGY

## 2020-12-23 ENCOUNTER — HOSPITAL ENCOUNTER (OUTPATIENT)
Dept: MAMMOGRAPHY | Facility: HOSPITAL | Age: 50
Discharge: HOME | End: 2020-12-23
Payer: COMMERCIAL

## 2020-12-23 VITALS — BODY MASS INDEX: 24.55 KG/M2 | HEIGHT: 61 IN | WEIGHT: 130 LBS

## 2020-12-23 DIAGNOSIS — Z12.31 BREAST CANCER SCREENING BY MAMMOGRAM: ICD-10-CM

## 2020-12-23 PROCEDURE — 77063 BREAST TOMOSYNTHESIS BI: CPT

## 2020-12-23 PROCEDURE — 77067 SCR MAMMO BI INCL CAD: CPT

## 2021-02-22 ENCOUNTER — LAB (OUTPATIENT)
Dept: LAB | Facility: HOSPITAL | Age: 51
End: 2021-02-22
Attending: FAMILY MEDICINE
Payer: COMMERCIAL

## 2021-02-22 DIAGNOSIS — Z85.43 PERSONAL HISTORY OF OVARIAN CANCER: ICD-10-CM

## 2021-02-22 DIAGNOSIS — E78.5 DYSLIPIDEMIA: ICD-10-CM

## 2021-02-22 DIAGNOSIS — R73.01 IMPAIRED FASTING GLUCOSE: ICD-10-CM

## 2021-02-22 DIAGNOSIS — C56.2 MALIGNANT NEOPLASM OF LEFT OVARY (HCC): ICD-10-CM

## 2021-02-22 LAB
ALBUMIN SERPL BCP-MCNC: 4 G/DL (ref 3.5–5)
ALP SERPL-CCNC: 59 U/L (ref 46–116)
ALT SERPL W P-5'-P-CCNC: 42 U/L (ref 12–78)
ANION GAP SERPL CALCULATED.3IONS-SCNC: 9 MMOL/L (ref 4–13)
AST SERPL W P-5'-P-CCNC: 23 U/L (ref 5–45)
BILIRUB SERPL-MCNC: 0.4 MG/DL (ref 0.2–1)
BUN SERPL-MCNC: 12 MG/DL (ref 5–25)
CALCIUM SERPL-MCNC: 9.2 MG/DL (ref 8.3–10.1)
CANCER AG125 SERPL-ACNC: 7.3 U/ML (ref 0–30)
CHLORIDE SERPL-SCNC: 102 MMOL/L (ref 100–108)
CHOLEST SERPL-MCNC: 204 MG/DL (ref 50–200)
CO2 SERPL-SCNC: 29 MMOL/L (ref 21–32)
CREAT SERPL-MCNC: 0.8 MG/DL (ref 0.6–1.3)
EST. AVERAGE GLUCOSE BLD GHB EST-MCNC: 117 MG/DL
GFR SERPL CREATININE-BSD FRML MDRD: 85 ML/MIN/1.73SQ M
GLUCOSE P FAST SERPL-MCNC: 109 MG/DL (ref 65–99)
HBA1C MFR BLD: 5.7 %
HDLC SERPL-MCNC: 71 MG/DL
LDLC SERPL CALC-MCNC: 121 MG/DL (ref 0–100)
NONHDLC SERPL-MCNC: 133 MG/DL
POTASSIUM SERPL-SCNC: 3.6 MMOL/L (ref 3.5–5.3)
PROT SERPL-MCNC: 7.4 G/DL (ref 6.4–8.2)
SODIUM SERPL-SCNC: 140 MMOL/L (ref 136–145)
TRIGL SERPL-MCNC: 60 MG/DL

## 2021-02-22 PROCEDURE — 80053 COMPREHEN METABOLIC PANEL: CPT

## 2021-02-22 PROCEDURE — 36415 COLL VENOUS BLD VENIPUNCTURE: CPT

## 2021-02-22 PROCEDURE — 83036 HEMOGLOBIN GLYCOSYLATED A1C: CPT

## 2021-02-22 PROCEDURE — 80061 LIPID PANEL: CPT

## 2021-02-22 PROCEDURE — 86304 IMMUNOASSAY TUMOR CA 125: CPT

## 2021-02-26 ENCOUNTER — OFFICE VISIT (OUTPATIENT)
Dept: FAMILY MEDICINE CLINIC | Facility: CLINIC | Age: 51
End: 2021-02-26
Payer: COMMERCIAL

## 2021-02-26 VITALS
WEIGHT: 133.2 LBS | TEMPERATURE: 98.4 F | HEIGHT: 61 IN | SYSTOLIC BLOOD PRESSURE: 138 MMHG | BODY MASS INDEX: 25.15 KG/M2 | DIASTOLIC BLOOD PRESSURE: 90 MMHG | HEART RATE: 97 BPM

## 2021-02-26 DIAGNOSIS — Z85.43 HISTORY OF OVARIAN CANCER: ICD-10-CM

## 2021-02-26 DIAGNOSIS — E78.5 DYSLIPIDEMIA: Primary | ICD-10-CM

## 2021-02-26 DIAGNOSIS — R73.01 IMPAIRED FASTING GLUCOSE: ICD-10-CM

## 2021-02-26 PROCEDURE — 99213 OFFICE O/P EST LOW 20 MIN: CPT | Performed by: FAMILY MEDICINE

## 2021-02-26 NOTE — PROGRESS NOTES
Assessment/Plan:    Impaired fasting glucose    I reviewed the patient's labs  Her fasting blood glucose is 109  Her hemoglobin A1c is 5 7%  Her goal is less than 5 6  She has pre diabetes  Of note, there is a family history of diabetes  Her father is diabetic  We discussed this  I asked the patient to continue to exercise and try to lose weight  We are going to have to continue to follow her blood sugars  She is at increased risk of developing full-blown diabetes in the future  Dyslipidemia    Lipid panel was reviewed  Her total cholesterol is 204  Triglycerides were 60  Her HDL cholesterol was excellent at 71  Goal is greater than 50  LDL cholesterol was 121  Goal is less than 100  I did explain to the patient that an HDL cholesterol over 59 is considered a negative risk factor for developing heart disease  However, I would like to see her LDL cholesterol lower  Patient does not want to take a statin at this time  We are going to continue diet and exercise as treatment  History of ovarian cancer    I reviewed with the patient the results of her  which was 7 3  Continue routine follow-up with Dr Armani Harrington  Diagnoses and all orders for this visit:    Dyslipidemia  -     Lipid panel; Future    Impaired fasting glucose  -     Hemoglobin A1C; Future  -     Comprehensive metabolic panel; Future    History of ovarian cancer  -     CBC and differential; Future        BMI Counseling: Body mass index is 25 17 kg/m²  The BMI is above normal  Nutrition recommendations include reducing portion sizes, decreasing overall calorie intake, 3-5 servings of fruits/vegetables daily, reducing fast food intake, consuming healthier snacks and moderation in carbohydrate intake  Exercise recommendations include exercising 3-5 times per week  Subjective:      Patient ID: Tushar Mercado is a 44 y o  female  This is a 57-year-old white female who presents to the office today for her routine checkup  The patient is doing well and has no complaints  She is taking her medication as prescribed  She watches her diet  She is exercising regularly  She continues routine follow-up with her gynecologic oncologist       The following portions of the patient's history were reviewed and updated as appropriate: allergies, current medications, past family history, past medical history, past social history, past surgical history and problem list     Review of Systems   Constitutional: Negative for activity change, appetite change and unexpected weight change  Respiratory: Negative for cough, shortness of breath and wheezing  Cardiovascular: Negative for chest pain, palpitations and leg swelling  Gastrointestinal: Negative for abdominal distention, abdominal pain, blood in stool, constipation, diarrhea and nausea  Objective:      /90 (BP Location: Right arm, Patient Position: Sitting, Cuff Size: Adult)   Pulse 97   Temp 98 4 °F (36 9 °C) (Tympanic)   Ht 5' 1" (1 549 m)   Wt 60 4 kg (133 lb 3 2 oz)   LMP 01/02/2019 (LMP Unknown)   BMI 25 17 kg/m²          Physical Exam  Vitals signs reviewed  Constitutional:       Comments: The patient is a 61-year-old white female who appears her stated age  She is pleasant, cooperative, and in no distress  HENT:      Head: Normocephalic and atraumatic  Right Ear: Tympanic membrane, ear canal and external ear normal  There is no impacted cerumen  Left Ear: Tympanic membrane, ear canal and external ear normal  There is no impacted cerumen  Mouth/Throat:      Mouth: Mucous membranes are moist       Pharynx: Oropharynx is clear  No oropharyngeal exudate or posterior oropharyngeal erythema  Eyes:      General: No scleral icterus  Right eye: No discharge  Left eye: No discharge  Conjunctiva/sclera: Conjunctivae normal       Pupils: Pupils are equal, round, and reactive to light  Neck:      Musculoskeletal: Neck supple  Cardiovascular:      Rate and Rhythm: Normal rate and regular rhythm  Pulses: Normal pulses  Heart sounds: Normal heart sounds  No murmur  No friction rub  No gallop  Pulmonary:      Effort: Pulmonary effort is normal  No respiratory distress  Breath sounds: Normal breath sounds  No stridor  No wheezing, rhonchi or rales  Abdominal:      General: Bowel sounds are normal  There is no distension  Palpations: Abdomen is soft  There is no mass  Tenderness: There is no abdominal tenderness  There is no guarding  Hernia: No hernia is present  Comments: There was no hepatosplenomegaly   Lymphadenopathy:      Cervical: No cervical adenopathy  Psychiatric:         Mood and Affect: Mood normal          Behavior: Behavior normal          Thought Content: Thought content normal          Judgment: Judgment normal          Extremities: Without cyanosis, clubbing, or edema  There was no calf tenderness and Homans sign is negative

## 2021-02-26 NOTE — PATIENT INSTRUCTIONS
Low-Sodium Diet   AMBULATORY CARE:   A low-sodium diet  limits foods that are high in sodium (salt)  You will need to follow a low-sodium diet if you have high blood pressure, kidney disease, or heart failure  You may also need to follow this diet if you have a condition that is causing your body to retain (hold) extra fluid  You may need to limit the amount of sodium you eat in a day to 1,500 to 2,000 mg  Ask your healthcare provider how much sodium you can have each day  How to use food labels to choose foods that are low in sodium:  Read food labels to find the amount of sodium they contain  The amount of sodium is listed in milligrams (mg)  The % Daily Value (DV) column tells you how much of your daily needs are met by 1 serving of the food for each nutrient listed  Choose foods that have less than 5% of the DV of sodium  These foods are considered low in sodium  Foods that have 20% or more of the DV of sodium are considered high in sodium  Some food labels may also list any of the following terms that tell you about the sodium content in the food:  · Sodium-free:  Less than 5 mg in each serving    · Very low sodium:  35 mg of sodium or less in each serving    · Low sodium:  140 mg of sodium or less in each serving    · Reduced sodium: At least 25% less sodium in each serving than the regular type    · Light in sodium:  50% less sodium in each serving    · Unsalted or no added salt:  No extra salt is added during processing (the food may still contain sodium)     Foods to avoid:  Salty foods are high in sodium  You should avoid the following:  · Processed foods:      ? Mixes for cornbread, biscuits, cake, and pudding     ? Instant foods, such as potatoes, cereals, noodles, and rice     ? Packaged foods, such as bread stuffing, rice and pasta mixes, snack dip mixes, and macaroni and cheese     ? Canned foods, such as canned vegetables, soups, broths, sauces, and vegetable or tomato juice    ?  Snack foods, such as salted chips, popcorn, pretzels, pork rinds, salted crackers, and salted nuts    ? Frozen foods, such as dinners, entrees, vegetables with sauces, and breaded meats    ? Sauerkraut, pickled vegetables, and other foods prepared in brine    · Meats and cheeses:      ? Smoked or cured meat, such as corned beef, mancini, ham, hot dogs, and sausage    ? Canned meats or spreads, such as potted meats, sardines, anchovies, and imitation seafood    ? Deli or lunch meats, such as bologna, ham, turkey, and roast beef    ? Processed cheese, such as American cheese and cheese spreads    · Condiments, sauces, and seasonings:      ? Salt (¼ teaspoon of salt contains 575 mg of sodium)    ? Seasonings made with salt, such as garlic salt, celery salt, onion salt, and seasoned salt    ? Regular soy sauce, barbecue sauce, teriyaki sauce, steak sauce, Worcestershire sauce, and most flavored vinegars    ? Canned gravy and mixes     ? Regular condiments, such as mustard, ketchup, and salad dressings    ? Pickles and olives    ? Meat tenderizers and monosodium glutamate (MSG)    Foods to include:  Read the food label to find the exact amount of sodium in each serving  · Bread and cereal:  Try to choose breads with less than 80 mg of sodium per serving  ? Bread, roll, marija, tortilla, or unsalted crackers  ? Ready-to-eat cereals with less than 5% DV of sodium (examples include shredded wheat and puffed rice)    ? Pasta    · Vegetables and fruits:      ? Unsalted fresh, frozen, or canned vegetables    ? Fresh, frozen, or canned fruits    ? Fruit juice    · Dairy:  One serving has about 150 mg of sodium  ? Milk, all types    ? Yogurt    ? Hard cheese, such as cheddar, Swiss, [employer], or mozzarella    · Meat and other protein foods:  Some raw meats may have added sodium  ? Plain meats, fish, and poultry     ? Eggs    · Other foods:      ? Homemade pudding    ? Unsalted nuts, popcorn, or pretzels    ?  Unsalted butter or margarine    Ways to decrease sodium:   · Add spices and herbs to foods instead of salt during cooking  Use salt-free seasonings to add flavor to foods  Examples include onion powder, garlic powder, basil, kerr powder, paprika, and parsley  Try lemon or lime juice or vinegar to give foods a tart flavor  Use hot peppers, pepper, or cayenne pepper to add a spicy flavor  · Do not keep a salt shaker at your kitchen table  This may help keep you from adding salt to food at the table  A teaspoon of salt has 2,300 mg of sodium  It may take time to get used to enjoying the natural flavor of food instead of adding salt  Talk to your healthcare provider before you use salt substitutes  Some salt substitutes have a high amount of potassium and need to be avoided if you have kidney disease  · Choose low-sodium foods at restaurants  Meals from restaurants are often high in sodium  Some restaurants have nutrition information on the menu that tells you the amount of sodium in their foods  If possible, ask for your food to be prepared with less, or no salt  · Shop for unsalted or low-sodium foods and snacks at the grocery store  Examples include unsalted or low-sodium broths, soups, and canned vegetables  Choose fresh or frozen vegetables instead  Choose unsalted nuts or seeds or fresh fruits or vegetables as snacks  Read food labels and choose salt-free, very low-sodium, or low-sodium foods  © Copyright [address] Information is for End User's use only and may not be sold, redistributed or otherwise used for commercial purposes  All illustrations and images included in CareNotes® are the copyrighted property of [employer]  or [facility] Dhiraj Quintero   The above information is an  only  It is not intended as medical advice for individual conditions or treatments  Talk to your doctor, nurse or pharmacist before following any medical regimen to see if it is safe and effective for you

## 2021-03-03 ENCOUNTER — OFFICE VISIT (OUTPATIENT)
Dept: GYNECOLOGIC ONCOLOGY | Facility: CLINIC | Age: 51
End: 2021-03-03
Payer: COMMERCIAL

## 2021-03-03 VITALS
HEART RATE: 75 BPM | SYSTOLIC BLOOD PRESSURE: 116 MMHG | DIASTOLIC BLOOD PRESSURE: 84 MMHG | HEIGHT: 64 IN | RESPIRATION RATE: 18 BRPM | WEIGHT: 132.5 LBS | TEMPERATURE: 97.8 F | BODY MASS INDEX: 22.62 KG/M2

## 2021-03-03 DIAGNOSIS — Z85.43 HISTORY OF OVARIAN CANCER: ICD-10-CM

## 2021-03-03 DIAGNOSIS — C56.9 MALIGNANT NEOPLASM OF OVARY, UNSPECIFIED LATERALITY (HCC): Primary | ICD-10-CM

## 2021-03-03 PROCEDURE — 1036F TOBACCO NON-USER: CPT | Performed by: OBSTETRICS & GYNECOLOGY

## 2021-03-03 PROCEDURE — 3008F BODY MASS INDEX DOCD: CPT | Performed by: OBSTETRICS & GYNECOLOGY

## 2021-03-03 PROCEDURE — 99213 OFFICE O/P EST LOW 20 MIN: CPT | Performed by: OBSTETRICS & GYNECOLOGY

## 2021-03-03 NOTE — ASSESSMENT & PLAN NOTE
Patient is very pleasant 22-year-old female with history of stage 1 C1 ovarian carcinoma  She underwent treatment with surgery alone  She remains in a clinical remission with normal exam and normal   The patient will follow-up in 3 months for repeat evaluation or earlier if symptoms warrant

## 2021-03-03 NOTE — PROGRESS NOTES
Assessment/Plan:    Problem List Items Addressed This Visit        Other    History of ovarian cancer       Patient is very pleasant 49-year-old female with history of stage 1 C1 ovarian carcinoma  She underwent treatment with surgery alone  She remains in a clinical remission with normal exam and normal   The patient will follow-up in 3 months for repeat evaluation or earlier if symptoms warrant  Other Visit Diagnoses     Malignant neoplasm of ovary, unspecified laterality ([facility] [address] )    -  Primary    Relevant Orders                CHIEF COMPLAINT:  Surveillance ovarian cancer      Problem:  Cancer Staging  History of ovarian cancer  Staging form: Ovary, Fallopian Tube, Primary Peritoneal, AJCC 8th Edition  - Clinical stage from 5/28/2019: FIGO Stage I (cT1, cN0, cM0) - Signed by Titus Benito MD on 5/28/2019  - Pathologic stage from 5/28/2019: FIGO Stage IC1, calculated as Stage IC (pT1c1, pN0, cM0) - Signed by Titus Benito MD on 5/28/2019        Previous therapy:  Oncology History   History of ovarian cancer   5/17/2019 Initial Diagnosis    [facility]     5/17/2019 Surgery    Patient underwent total abdominal hysterectomy bilateral salpingo oophorectomy radical omentectomy bilateral pelvic lymphadenectomy and staging procedure for 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       5/28/2019 -  Cancer Staged    Staging form: Ovary, Fallopian Tube, Primary Peritoneal, AJCC 8th Edition  - Pathologic stage from 5/28/2019: FIGO Stage IC1, calculated as Stage IC (pT1c1, pN0, cM0) - Signed by Titus Benito MD on 5/28/2019  Histologic grade (G): G3  Histologic grading system: 4 grade system  Residual tumor volume after primary cytoreductive surgery: No gross tumor  Stage used in treatment planning: Yes  National guidelines used in treatment planning: Yes       5/28/2019 -  Cancer Staged    Staging form: Ovary, Fallopian Tube, Primary Peritoneal, AJCC 8th Edition  - Clinical stage from 5/28/2019: FIGO Stage I (cT1, cN0, cM0) - Signed by Jose Roberto Leigh MD on 5/28/2019  Stage used in treatment planning: Yes  National guidelines used in treatment planning: Yes        Chemotherapy    Carbo platinum area under the curve of 6 paclitaxel 175 milligrams/meter squared for 3-6 cycles  Patient refused treatment           Patient ID: Helio Stevens is a 44 y o  female   Patient is very pleasant 53-year-old female with history of 1 C1 ovarian adenocarcinoma treated with surgery alone  Patient refused chemotherapy  Since that time she has been without evidence of recurrence of disease  Her most recent  remains within the normal range at 7  The patient has completed her mesh surgical  hernia repair and is doing well with that      she has no new complaint  Today, the patient is doing well  She denies significant abdominal pain, pelvic pain, nausea, vomiting, constipation, diarrhea, fevers, chills, or vaginal bleeding  The following portions of the patient's history were reviewed and updated as appropriate: allergies, current medications, past family history, past social history, past surgical history and problem list     Review of Systems   Constitutional: Negative  HENT: Negative  Eyes: Negative  Respiratory: Negative  Cardiovascular: Negative  Gastrointestinal: Negative  Endocrine: Negative  Genitourinary: Negative  Musculoskeletal: Negative  Skin: Negative  Neurological: Negative  Hematological: Negative  Psychiatric/Behavioral: Negative          Current Outpatient Medications   Medication Sig Dispense Refill    ascorbic acid (VITAMIN C) 250 mg tablet Take 250 mg by mouth daily      Cod Liver Oil (COD LIVER PO) Take 1 Dose by mouth 3 (three) times a week       MAGNESIUM BISGLYCINATE PO       multivitamin (THERAGRAN) TABS Take 1 tablet by mouth daily      OLIVE LEAF PO Take 300 mg by mouth daily      Probiotic Product (PROBIOTIC PO) Take 1 capsule by mouth daily       TURMERIC PO Take by mouth 2 (two) times a day        No current facility-administered medications for this visit  Objective:    Blood pressure 116/84, pulse 75, temperature 97 8 °F (36 6 °C), resp  rate 18, height 5' 4" (1 626 m), weight 60 1 kg (132 lb 8 oz), last menstrual period 01/02/2019  Body mass index is 22 74 kg/m²  Body surface area is 1 64 meters squared  Physical Exam  Constitutional:       Appearance: She is well-developed  HENT:      Head: Normocephalic and atraumatic  Neck:      Musculoskeletal: Normal range of motion and neck supple  Thyroid: No thyromegaly  Cardiovascular:      Rate and Rhythm: Normal rate and regular rhythm  Heart sounds: Normal heart sounds  Pulmonary:      Effort: Pulmonary effort is normal       Breath sounds: Normal breath sounds  Abdominal:      General: Bowel sounds are normal       Palpations: Abdomen is soft  Comments: Well healed laparoscopic incisions  Genitourinary:     Comments: -Normal external female genitalia, normal Bartholin's and [place]'s glands                  -Normal midline urethral meatus  No lesions notes                  -Bladder without fullness mass or tenderness                  -Vagina without lesion or discharge No significant cystocele or rectocele noted                  -Cervix surgically absent                  -Uterus surgically absent                  -Adnexae surgically absent                  - Anus without fissure of lesion    Musculoskeletal: Normal range of motion  Lymphadenopathy:      Cervical: No cervical adenopathy  Skin:     General: Skin is warm and dry  Neurological:      Mental Status: She is alert and oriented to person, place, and time     Psychiatric:         Behavior: Behavior normal          Lab Results   Component Value Date     7 3 02/22/2021     Lab Results   Component Value Date     10/23/2014    K 3 6 02/22/2021  02/22/2021    CO2 29 02/22/2021    ANIONGAP 9 10/23/2014    BUN 12 02/22/2021    CREATININE 0 80 02/22/2021    GLUCOSE 132 10/23/2014    GLUF 109 (H) 02/22/2021    CALCIUM 9 2 02/22/2021    AST 23 02/22/2021    ALT 42 02/22/2021    ALKPHOS 59 02/22/2021    PROT 8 3 (H) 10/23/2014    BILITOT 0 2 10/23/2014    EGFR 85 02/22/2021     Lab Results   Component Value Date    WBC 7 61 12/24/2019    HGB 13 4 12/24/2019    HCT 39 0 12/24/2019    MCV 92 12/24/2019     12/24/2019     Lab Results   Component Value Date    NEUTROABS 3 99 12/24/2019        Trend:  Lab Results   Component Value Date     7 3 02/22/2021     6 3 11/30/2020     5 3 08/24/2020     5 0 05/21/2020     4 3 12/27/2019     4 9 09/16/2019     45 4 (H) 05/10/2019

## 2021-03-03 NOTE — LETTER
March 3, 2021     Christy Amado [address]  [place] [address]    Patient: Brigitte Bunch   YOB: 1970   Date of Visit: 3/3/2021       Dear Dr Hilario Callahan:    Thank you for referring Silvestre Mehta to me for evaluation  Below are my notes for this consultation  If you have questions, please do not hesitate to call me  I look forward to following your patient along with you  Sincerely,        Vitaly Dow MD        CC: No Recipients  Vitaly Dow MD  3/3/2021  2:10 PM  Incomplete  Assessment/Plan:    Problem List Items Addressed This Visit        Other    History of ovarian cancer       Patient is very pleasant 57-year-old female with history of stage 1 C1 ovarian carcinoma  She underwent treatment with surgery alone  She remains in a clinical remission with normal exam and normal   The patient will follow-up in 3 months for repeat evaluation or earlier if symptoms warrant  Other Visit Diagnoses     Malignant neoplasm of ovary, unspecified laterality ([facility] [address] )    -  Primary    Relevant Orders                CHIEF COMPLAINT:  Surveillance ovarian cancer      Problem:  Cancer Staging  History of ovarian cancer  Staging form: Ovary, Fallopian Tube, Primary Peritoneal, AJCC 8th Edition  - Clinical stage from 5/28/2019: FIGO Stage I (cT1, cN0, cM0) - Signed by Vitaly Dow MD on 5/28/2019  - Pathologic stage from 5/28/2019: FIGO Stage IC1, calculated as Stage IC (pT1c1, pN0, cM0) - Signed by Vitaly Dow MD on 5/28/2019        Previous therapy:  Oncology History   History of ovarian cancer   5/17/2019 Initial Diagnosis    Malignant neoplasm of left ovary ([facility] [address] )     5/17/2019 Surgery    Patient underwent total abdominal hysterectomy bilateral salpingo oophorectomy radical omentectomy bilateral pelvic lymphadenectomy and staging procedure for 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       5/28/2019 -  Cancer Staged    Staging form: Ovary, Fallopian Tube, Primary Peritoneal, AJCC 8th Edition  - Pathologic stage from 5/28/2019: FIGO Stage IC1, calculated as Stage IC (pT1c1, pN0, cM0) - Signed by Rajni Wu MD on 5/28/2019  Histologic grade (G): G3  Histologic grading system: 4 grade system  Residual tumor volume after primary cytoreductive surgery: No gross tumor  Stage used in treatment planning: Yes  National guidelines used in treatment planning: Yes       5/28/2019 -  Cancer Staged    Staging form: Ovary, Fallopian Tube, Primary Peritoneal, AJCC 8th Edition  - Clinical stage from 5/28/2019: FIGO Stage I (cT1, cN0, cM0) - Signed by Rajni Wu MD on 5/28/2019  Stage used in treatment planning: Yes  National guidelines used in treatment planning: Yes        Chemotherapy    Carbo platinum area under the curve of 6 paclitaxel 175 milligrams/meter squared for 3-6 cycles  Patient refused treatment           Patient ID: Lui Benton is a 44 y o  female   Patient is very pleasant 57-year-old female with history of 1 C1 ovarian adenocarcinoma treated with surgery alone  Patient refused chemotherapy  Since that time she has been without evidence of recurrence of disease  Her most recent  remains within the normal range at 7  The patient has completed her mesh surgical  hernia repair and is doing well with that      she has no new complaint  Today, the patient is doing well  She denies significant abdominal pain, pelvic pain, nausea, vomiting, constipation, diarrhea, fevers, chills, or vaginal bleeding  The following portions of the patient's history were reviewed and updated as appropriate: allergies, current medications, past family history, past social history, past surgical history and problem list     Review of Systems   Constitutional: Negative  HENT: Negative  Eyes: Negative  Respiratory: Negative  Cardiovascular: Negative  Gastrointestinal: Negative  Endocrine: Negative  Genitourinary: Negative  Musculoskeletal: Negative  Skin: Negative  Neurological: Negative  Hematological: Negative  Psychiatric/Behavioral: Negative  Current Outpatient Medications   Medication Sig Dispense Refill    ascorbic acid (VITAMIN C) 250 mg tablet Take 250 mg by mouth daily      Cod Liver Oil (COD LIVER PO) Take 1 Dose by mouth 3 (three) times a week       MAGNESIUM BISGLYCINATE PO       multivitamin (THERAGRAN) TABS Take 1 tablet by mouth daily      OLIVE LEAF PO Take 300 mg by mouth daily      Probiotic Product (PROBIOTIC PO) Take 1 capsule by mouth daily       TURMERIC PO Take by mouth 2 (two) times a day        No current facility-administered medications for this visit  Objective:    Blood pressure 116/84, pulse 75, temperature 97 8 °F (36 6 °C), resp  rate 18, height 5' 4" (1 626 m), weight 60 1 kg (132 lb 8 oz), last menstrual period 01/02/2019  Body mass index is 22 74 kg/m²  Body surface area is 1 64 meters squared  Physical Exam  Constitutional:       Appearance: She is well-developed  HENT:      Head: Normocephalic and atraumatic  Neck:      Musculoskeletal: Normal range of motion and neck supple  Thyroid: No thyromegaly  Cardiovascular:      Rate and Rhythm: Normal rate and regular rhythm  Heart sounds: Normal heart sounds  Pulmonary:      Effort: Pulmonary effort is normal       Breath sounds: Normal breath sounds  Abdominal:      General: Bowel sounds are normal       Palpations: Abdomen is soft  Comments: Well healed laparoscopic incisions  Genitourinary:     Comments: -Normal external female genitalia, normal Bartholin's and [place]'s glands                  -Normal midline urethral meatus   No lesions notes                  -Bladder without fullness mass or tenderness                  -Vagina without lesion or discharge No significant cystocele or rectocele noted                  -Cervix surgically absent                  -Uterus surgically absent                  -Adnexae surgically absent                  - Anus without fissure of lesion    Musculoskeletal: Normal range of motion  Lymphadenopathy:      Cervical: No cervical adenopathy  Skin:     General: Skin is warm and dry  Neurological:      Mental Status: She is alert and oriented to person, place, and time     Psychiatric:         Behavior: Behavior normal          Lab Results   Component Value Date     7 3 02/22/2021     Lab Results   Component Value Date     10/23/2014    K 3 6 02/22/2021     02/22/2021    CO2 29 02/22/2021    ANIONGAP 9 10/23/2014    BUN 12 02/22/2021    CREATININE 0 80 02/22/2021    GLUCOSE 132 10/23/2014    GLUF 109 (H) 02/22/2021    CALCIUM 9 2 02/22/2021    AST 23 02/22/2021    ALT 42 02/22/2021    ALKPHOS 59 02/22/2021    PROT 8 3 (H) 10/23/2014    BILITOT 0 2 10/23/2014    EGFR 85 02/22/2021     Lab Results   Component Value Date    WBC 7 61 12/24/2019    HGB 13 4 12/24/2019    HCT 39 0 12/24/2019    MCV 92 12/24/2019     12/24/2019     Lab Results   Component Value Date    NEUTROABS 3 99 12/24/2019        Trend:  Lab Results   Component Value Date     7 3 02/22/2021     6 3 11/30/2020     5 3 08/24/2020     5 0 05/21/2020     4 3 12/27/2019     4 9 09/16/2019     45 4 (H) 05/10/2019                  Iker Mistry MD  3/3/2021  1:57 PM  Incomplete  Assessment/Plan:    Problem List Items Addressed This Visit     None            CHIEF COMPLAINT:  Surveillance ovarian cancer      Problem:  Cancer Staging  History of ovarian cancer  Staging form: Ovary, Fallopian Tube, Primary Peritoneal, AJCC 8th Edition  - Clinical stage from 5/28/2019: FIGO Stage I (cT1, cN0, cM0) - Signed by Iker Mistry MD on 5/28/2019  - Pathologic stage from 5/28/2019: FIGO Stage IC1, calculated as Stage IC (pT1c1, pN0, cM0) - Signed by Iker Mistry MD on 5/28/2019        Previous therapy:  Oncology History   History of ovarian cancer   5/17/2019 Initial Diagnosis    Malignant neoplasm of left ovary ([address] )     5/17/2019 Surgery    Patient underwent total abdominal hysterectomy bilateral salpingo oophorectomy radical omentectomy bilateral pelvic lymphadenectomy and staging procedure for 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  5/28/2019 -  Cancer Staged    Staging form: Ovary, Fallopian Tube, Primary Peritoneal, AJCC 8th Edition  - Pathologic stage from 5/28/2019: FIGO Stage IC1, calculated as Stage IC (pT1c1, pN0, cM0) - Signed by Josey Crespo MD on 5/28/2019  Histologic grade (G): G3  Histologic grading system: 4 grade system  Residual tumor volume after primary cytoreductive surgery: No gross tumor  Stage used in treatment planning: Yes  National guidelines used in treatment planning: Yes       5/28/2019 -  Cancer Staged    Staging form: Ovary, Fallopian Tube, Primary Peritoneal, AJCC 8th Edition  - Clinical stage from 5/28/2019: FIGO Stage I (cT1, cN0, cM0) - Signed by Josey Crespo MD on 5/28/2019  Stage used in treatment planning: Yes  National guidelines used in treatment planning: Yes        Chemotherapy    Carbo platinum area under the curve of 6 paclitaxel 175 milligrams/meter squared for 3-6 cycles   Patient refused treatment           Patient ID: Irasema Mccoy is a 44 y o  female  HPI    The following portions of the patient's history were reviewed and updated as appropriate: allergies, current medications, past family history, past social history, past surgical history and problem list     Review of Systems    Current Outpatient Medications   Medication Sig Dispense Refill    ascorbic acid (VITAMIN C) 250 mg tablet Take 250 mg by mouth daily      Cod Liver Oil (COD LIVER PO) Take 1 Dose by mouth 3 (three) times a week       MAGNESIUM BISGLYCINATE PO       multivitamin (THERAGRAN) TABS Take 1 tablet by mouth daily      OLIVE LEAF PO Take 300 mg by mouth daily      Probiotic Product (PROBIOTIC PO) Take 1 capsule by mouth daily       TURMERIC PO Take by mouth 2 (two) times a day        No current facility-administered medications for this visit  Objective:    Blood pressure 116/84, pulse 75, temperature 97 8 °F (36 6 °C), resp  rate 18, height 5' 4" (1 626 m), weight 60 1 kg (132 lb 8 oz), last menstrual period 01/02/2019  Body mass index is 22 74 kg/m²  Body surface area is 1 64 meters squared      Physical Exam    Lab Results   Component Value Date     7 3 02/22/2021     Lab Results   Component Value Date     10/23/2014    K 3 6 02/22/2021     02/22/2021    CO2 29 02/22/2021    ANIONGAP 9 10/23/2014    BUN 12 02/22/2021    CREATININE 0 80 02/22/2021    GLUCOSE 132 10/23/2014    GLUF 109 (H) 02/22/2021    CALCIUM 9 2 02/22/2021    AST 23 02/22/2021    ALT 42 02/22/2021    ALKPHOS 59 02/22/2021    PROT 8 3 (H) 10/23/2014    BILITOT 0 2 10/23/2014    EGFR 85 02/22/2021     Lab Results   Component Value Date    WBC 7 61 12/24/2019    HGB 13 4 12/24/2019    HCT 39 0 12/24/2019    MCV 92 12/24/2019     12/24/2019     Lab Results   Component Value Date    NEUTROABS 3 99 12/24/2019        Trend:  Lab Results   Component Value Date     7 3 02/22/2021     6 3 11/30/2020     5 3 08/24/2020     5 0 05/21/2020     4 3 12/27/2019     4 9 09/16/2019     45 4 (H) 05/10/2019

## 2021-03-14 NOTE — ASSESSMENT & PLAN NOTE
I reviewed the patient's labs  Her fasting blood glucose is 109  Her hemoglobin A1c is 5 7%  Her goal is less than 5 6  She has pre diabetes  Of note, there is a family history of diabetes  Her father is diabetic  We discussed this  I asked the patient to continue to exercise and try to lose weight  We are going to have to continue to follow her blood sugars  She is at increased risk of developing full-blown diabetes in the future

## 2021-03-14 NOTE — ASSESSMENT & PLAN NOTE
Lipid panel was reviewed  Her total cholesterol is 204  Triglycerides were 60  Her HDL cholesterol was excellent at 71  Goal is greater than 50  LDL cholesterol was 121  Goal is less than 100  I did explain to the patient that an HDL cholesterol over 59 is considered a negative risk factor for developing heart disease  However, I would like to see her LDL cholesterol lower  Patient does not want to take a statin at this time  We are going to continue diet and exercise as treatment

## 2021-03-14 NOTE — ASSESSMENT & PLAN NOTE
I reviewed with the patient the results of her  which was 7 3  Continue routine follow-up with Dr Constanza Gan

## 2021-06-02 ENCOUNTER — OFFICE VISIT (OUTPATIENT)
Dept: GYNECOLOGIC ONCOLOGY | Facility: CLINIC | Age: 51
End: 2021-06-02
Payer: COMMERCIAL

## 2021-06-02 VITALS
RESPIRATION RATE: 18 BRPM | DIASTOLIC BLOOD PRESSURE: 92 MMHG | SYSTOLIC BLOOD PRESSURE: 144 MMHG | HEIGHT: 64 IN | TEMPERATURE: 97.9 F | HEART RATE: 62 BPM | BODY MASS INDEX: 22.36 KG/M2 | WEIGHT: 131 LBS

## 2021-06-02 DIAGNOSIS — Z85.43 HISTORY OF OVARIAN CANCER: ICD-10-CM

## 2021-06-02 DIAGNOSIS — C56.9 MALIGNANT NEOPLASM OF OVARY, UNSPECIFIED LATERALITY (HCC): Primary | ICD-10-CM

## 2021-06-02 PROCEDURE — 1036F TOBACCO NON-USER: CPT | Performed by: OBSTETRICS & GYNECOLOGY

## 2021-06-02 PROCEDURE — 3008F BODY MASS INDEX DOCD: CPT | Performed by: OBSTETRICS & GYNECOLOGY

## 2021-06-02 PROCEDURE — 99214 OFFICE O/P EST MOD 30 MIN: CPT | Performed by: OBSTETRICS & GYNECOLOGY

## 2021-06-02 NOTE — PROGRESS NOTES
Assessment/Plan:    Problem List Items Addressed This Visit        Other    History of ovarian cancer     Patient is very pleasant 57-year-old female with history of 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 status post TAHBSO and debulking  She has undergone no adjuvant chemotherapy per her request   She is presently doing well and remains in a clinical remission by  and exam   We will see her back in 6 months for repeat evaluation   will again be drawn at that time  As there was a slight rise with her last , likely due to change in lab, we will repeat this lab result at 3 months  The patient will send us the result  The patient is up-to-date with breast exams and mammograms which was normal in December of last year  Relevant Orders          Other Visit Diagnoses     Malignant neoplasm of ovary, unspecified laterality ([facility] )    -  Primary    Relevant Orders                CHIEF COMPLAINT:   Surveillance ovarian cancer      Problem:  Cancer Staging  History of ovarian cancer  Staging form: Ovary, Fallopian Tube, Primary Peritoneal, AJCC 8th Edition  - Clinical stage from 5/28/2019: FIGO Stage I (cT1, cN0, cM0) - Signed by Margy Fuentes MD on 5/28/2019  - Pathologic stage from 5/28/2019: FIGO Stage IC1, calculated as Stage IC (pT1c1, pN0, cM0) - Signed by Margy Fuentes MD on 5/28/2019        Previous therapy:  Oncology History   History of ovarian cancer   5/17/2019 Initial Diagnosis    Malignant neoplasm of left ovary ([facility] [address] )     5/17/2019 Surgery    Patient underwent total abdominal hysterectomy bilateral salpingo oophorectomy radical omentectomy bilateral pelvic lymphadenectomy and staging procedure for 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       5/28/2019 -  Cancer Staged    Staging form: Ovary, Fallopian Tube, Primary Peritoneal, AJCC 8th Edition  - Pathologic stage from 5/28/2019: FIGO Stage IC1, calculated as Stage IC (pT1c1, pN0, cM0) - Signed by Rachid Villafuerte MD on 5/28/2019  Histologic grade (G): G3  Histologic grading system: 4 grade system  Residual tumor volume after primary cytoreductive surgery: No gross tumor  Stage used in treatment planning: Yes  National guidelines used in treatment planning: Yes       5/28/2019 -  Cancer Staged    Staging form: Ovary, Fallopian Tube, Primary Peritoneal, AJCC 8th Edition  - Clinical stage from 5/28/2019: FIGO Stage I (cT1, cN0, cM0) - Signed by Rachid Villafuerte MD on 5/28/2019  Stage used in treatment planning: Yes  National guidelines used in treatment planning: Yes        Chemotherapy    Carbo platinum area under the curve of 6 paclitaxel 175 milligrams/meter squared for 3-6 cycles  Patient refused treatment           Patient ID: Armaan Valerio is a 46 y o  female   Patient is very pleasant 58-year-old female with history of 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 status post TAHBSO staging procedure  The patient was recommended carboplatin paclitaxel chemotherapy but elected not to undergo treatment  She has been disease free since that time  Most recently her  remains stable at 10 9  She is now 2 years since her initial diagnosis  Today, the patient is doing well  She denies significant abdominal pain, pelvic pain, nausea, vomiting, constipation, diarrhea, fevers, chills, or vaginal bleeding  The following portions of the patient's history were reviewed and updated as appropriate: allergies, current medications, past family history, past social history, past surgical history and problem list     Review of Systems   Constitutional: Negative  HENT: Negative  Eyes: Negative  Respiratory: Negative  Cardiovascular: Negative  Gastrointestinal: Negative  Endocrine: Negative  Genitourinary: Negative  Musculoskeletal: Negative  Skin: Negative  Neurological: Negative  Hematological: Negative  Psychiatric/Behavioral: Negative          Current Outpatient Medications   Medication Sig Dispense Refill    ascorbic acid (VITAMIN C) 250 mg tablet Take 250 mg by mouth daily      Cod Liver Oil (COD LIVER PO) Take 1 Dose by mouth 3 (three) times a week       MAGNESIUM BISGLYCINATE PO       multivitamin (THERAGRAN) TABS Take 1 tablet by mouth daily      OLIVE LEAF PO Take 300 mg by mouth daily      Probiotic Product (PROBIOTIC PO) Take 1 capsule by mouth daily       TURMERIC PO Take by mouth 2 (two) times a day        No current facility-administered medications for this visit  Objective:    Blood pressure 144/92, pulse 62, temperature 97 9 °F (36 6 °C), resp  rate 18, height 5' 4" (1 626 m), weight 59 4 kg (131 lb), last menstrual period 01/02/2019  Body mass index is 22 49 kg/m²  Body surface area is 1 63 meters squared      Physical Exam    Lab Results   Component Value Date     7 3 02/22/2021     Lab Results   Component Value Date     10/23/2014    K 3 6 02/22/2021     02/22/2021    CO2 29 02/22/2021    ANIONGAP 9 10/23/2014    BUN 12 02/22/2021    CREATININE 0 80 02/22/2021    GLUCOSE 132 10/23/2014    GLUF 109 (H) 02/22/2021    CALCIUM 9 2 02/22/2021    AST 23 02/22/2021    ALT 42 02/22/2021    ALKPHOS 59 02/22/2021    PROT 8 3 (H) 10/23/2014    BILITOT 0 2 10/23/2014    EGFR 85 02/22/2021     Lab Results   Component Value Date    WBC 7 61 12/24/2019    HGB 13 4 12/24/2019    HCT 39 0 12/24/2019    MCV 92 12/24/2019     12/24/2019     Lab Results   Component Value Date    NEUTROABS 3 99 12/24/2019        Trend:  Lab Results   Component Value Date     7 3 02/22/2021     6 3 11/30/2020     5 3 08/24/2020     5 0 05/21/2020     4 3 12/27/2019     4 9 09/16/2019     45 4 (H) 05/10/2019

## 2021-06-02 NOTE — LETTER
June 2, 2021     Kwan Patel [address]   [address]  [facility] [address]    Patient: Trisha Real   YOB: 1970   Date of Visit: 6/2/2021       Dear Dr Aicha Basilio:    Thank you for referring Ramy Cummings to me for evaluation  Below are my notes for this consultation  If you have questions, please do not hesitate to call me  I look forward to following your patient along with you  Sincerely,        Carly Dinero MD        CC: No Recipients  Carly Dinero MD  6/2/2021  1:18 PM  Sign when Signing Visit  Assessment/Plan:    Problem List Items Addressed This Visit        Other    History of ovarian cancer     Patient is very pleasant 57-year-old female with history of 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 status post TAHBSO and debulking  She has undergone no adjuvant chemotherapy per her request   She is presently doing well and remains in a clinical remission by  and exam   We will see her back in 6 months for repeat evaluation   will again be drawn at that time  As there was a slight rise with her last , likely due to change in lab, we will repeat this lab result at 3 months  The patient will send us the result  The patient is up-to-date with breast exams and mammograms which was normal in December of last year           Relevant Orders          Other Visit Diagnoses     Malignant neoplasm of ovary, unspecified laterality ([facility] [address] )    -  Primary    Relevant Orders                CHIEF COMPLAINT:   Surveillance ovarian cancer      Problem:  Cancer Staging  History of ovarian cancer  Staging form: Ovary, Fallopian Tube, Primary Peritoneal, AJCC 8th Edition  - Clinical stage from 5/28/2019: FIGO Stage I (cT1, cN0, cM0) - Signed by Carly Dinero MD on 5/28/2019  - Pathologic stage from 5/28/2019: FIGO Stage IC1, calculated as Stage IC (pT1c1, pN0, cM0) - Signed by Carly Dinero MD on 5/28/2019        Previous therapy:  Oncology History History of ovarian cancer   5/17/2019 Initial Diagnosis    Malignant neoplasm of left ovary ([address] )     5/17/2019 Surgery    Patient underwent total abdominal hysterectomy bilateral salpingo oophorectomy radical omentectomy bilateral pelvic lymphadenectomy and staging procedure for 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  5/28/2019 -  Cancer Staged    Staging form: Ovary, Fallopian Tube, Primary Peritoneal, AJCC 8th Edition  - Pathologic stage from 5/28/2019: FIGO Stage IC1, calculated as Stage IC (pT1c1, pN0, cM0) - Signed by Jam Braga MD on 5/28/2019  Histologic grade (G): G3  Histologic grading system: 4 grade system  Residual tumor volume after primary cytoreductive surgery: No gross tumor  Stage used in treatment planning: Yes  National guidelines used in treatment planning: Yes       5/28/2019 -  Cancer Staged    Staging form: Ovary, Fallopian Tube, Primary Peritoneal, AJCC 8th Edition  - Clinical stage from 5/28/2019: FIGO Stage I (cT1, cN0, cM0) - Signed by Jam Braga MD on 5/28/2019  Stage used in treatment planning: Yes  National guidelines used in treatment planning: Yes        Chemotherapy    Carbo platinum area under the curve of 6 paclitaxel 175 milligrams/meter squared for 3-6 cycles  Patient refused treatment           Patient ID: Judith Francis is a 46 y o  female   Patient is very pleasant 57-year-old female with history of 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 status post TAHBSO staging procedure  The patient was recommended carboplatin paclitaxel chemotherapy but elected not to undergo treatment  She has been disease free since that time  Most recently her  remains stable at 10 9  She is now 2 years since her initial diagnosis  Today, the patient is doing well  She denies significant abdominal pain, pelvic pain, nausea, vomiting, constipation, diarrhea, fevers, chills, or vaginal bleeding        The following portions of the patient's history were reviewed and updated as appropriate: allergies, current medications, past family history, past social history, past surgical history and problem list     Review of Systems   Constitutional: Negative  HENT: Negative  Eyes: Negative  Respiratory: Negative  Cardiovascular: Negative  Gastrointestinal: Negative  Endocrine: Negative  Genitourinary: Negative  Musculoskeletal: Negative  Skin: Negative  Neurological: Negative  Hematological: Negative  Psychiatric/Behavioral: Negative  Current Outpatient Medications   Medication Sig Dispense Refill    ascorbic acid (VITAMIN C) 250 mg tablet Take 250 mg by mouth daily      Cod Liver Oil (COD LIVER PO) Take 1 Dose by mouth 3 (three) times a week       MAGNESIUM BISGLYCINATE PO       multivitamin (THERAGRAN) TABS Take 1 tablet by mouth daily      OLIVE LEAF PO Take 300 mg by mouth daily      Probiotic Product (PROBIOTIC PO) Take 1 capsule by mouth daily       TURMERIC PO Take by mouth 2 (two) times a day        No current facility-administered medications for this visit  Objective:    Blood pressure 144/92, pulse 62, temperature 97 9 °F (36 6 °C), resp  rate 18, height 5' 4" (1 626 m), weight 59 4 kg (131 lb), last menstrual period 01/02/2019  Body mass index is 22 49 kg/m²  Body surface area is 1 63 meters squared      Physical Exam    Lab Results   Component Value Date     7 3 02/22/2021     Lab Results   Component Value Date     10/23/2014    K 3 6 02/22/2021     02/22/2021    CO2 29 02/22/2021    ANIONGAP 9 10/23/2014    BUN 12 02/22/2021    CREATININE 0 80 02/22/2021    GLUCOSE 132 10/23/2014    GLUF 109 (H) 02/22/2021    CALCIUM 9 2 02/22/2021    AST 23 02/22/2021    ALT 42 02/22/2021    ALKPHOS 59 02/22/2021    PROT 8 3 (H) 10/23/2014    BILITOT 0 2 10/23/2014    EGFR 85 02/22/2021     Lab Results   Component Value Date    WBC 7 61 12/24/2019    HGB 13 4 12/24/2019    HCT 39 0 12/24/2019 MCV 92 12/24/2019     12/24/2019     Lab Results   Component Value Date    NEUTROABS 3 99 12/24/2019        Trend:  Lab Results   Component Value Date     7 3 02/22/2021     6 3 11/30/2020     5 3 08/24/2020     5 0 05/21/2020     4 3 12/27/2019     4 9 09/16/2019     45 4 (H) 05/10/2019

## 2021-06-02 NOTE — ASSESSMENT & PLAN NOTE
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

## 2021-06-21 ENCOUNTER — OFFICE VISIT (OUTPATIENT)
Dept: SURGERY | Facility: CLINIC | Age: 51
End: 2021-06-21
Payer: COMMERCIAL

## 2021-06-21 VITALS
SYSTOLIC BLOOD PRESSURE: 116 MMHG | HEART RATE: 72 BPM | RESPIRATION RATE: 18 BRPM | HEIGHT: 64 IN | BODY MASS INDEX: 22.88 KG/M2 | WEIGHT: 134 LBS | TEMPERATURE: 97.3 F | DIASTOLIC BLOOD PRESSURE: 82 MMHG

## 2021-06-21 DIAGNOSIS — K43.2 INCISIONAL HERNIA, WITHOUT OBSTRUCTION OR GANGRENE: Primary | ICD-10-CM

## 2021-06-21 PROCEDURE — 99213 OFFICE O/P EST LOW 20 MIN: CPT | Performed by: SURGERY

## 2021-06-21 NOTE — LETTER
June 21, 2021     [facility] [address]   [address]  [facility] [address]    Patient: Aspen Valenzuela   YOB: 1970   Date of Visit: 6/21/2021       Dear Dr Terry Littlejohnate:    Thank you for referring Silvia Puentes to me for evaluation  Below are my notes for this consultation  If you have questions, please do not hesitate to call me  I look forward to following your patient along with you  Sincerely,        Annabelle Solorio MD        CC: No Recipients  Annabelle Solorio MD  6/21/2021  4:04 PM  Incomplete  Assessment/Plan:    Incisional hernia, without obstruction or gangrene    Patient returns for 1 year's scheduled follow-up status post laparoscopic repair of a large lower midline ventral incisional hernia following the treatment of her ovarian carcinoma  Today the patient voiced no new complaints with regards to her wound  The patient does note an occasional pain in the mid abdomen which is generally self-limited and resolved spontaneously  She notes no new lumps or masses that would suggest the presence of a recurrent hernia  She has resume normal levels of activity including still water kayaking  We also discussed the recent death of her father  On physical examination the patient is a pleasant well-nourished well-developed female  She is in no acute distress  Awake alert oriented  Competent reliable as a historian  Inspection and palpation of her abdomen reveals it to be flat  I appreciate no signs of recurrent hernia on interrogation of the repair  The patient appears well and clinically stable status post laparoscopic repair of a complex lower midline ventral incisional hernia  I have encouraged her to continue to enjoy her life in engage in normal levels of activity as she feels fit  I look for to seeing her back in a year's time  She has been encouraged to follow up sooner on an as-needed basis             Subjective:      Patient ID: Shannony Sever Yaneli Velazquez is a 46 y o  female  Patient is here today for a one year follow up s/p lap incisional hernia repair 01/21/2020  Stated that she is doing well and denied any new problems or concerns  No fever or chills  Teena Lopez MA          Review of Systems   Constitutional: Negative for chills and fever  HENT: Negative for ear pain and sore throat  Eyes: Negative for pain and visual disturbance  Respiratory: Negative for cough and shortness of breath  Cardiovascular: Negative for chest pain and palpitations  Gastrointestinal: Negative for abdominal pain and vomiting  Genitourinary: Negative for dysuria and hematuria  Musculoskeletal: Negative for arthralgias and back pain  Skin: Negative for color change and rash  Neurological: Negative for seizures and syncope  All other systems reviewed and are negative  Objective:      /82 (BP Location: Left arm, Patient Position: Sitting, Cuff Size: Standard)   Pulse 72   Temp (!) 97 3 °F (36 3 °C) (Temporal)   Resp 18   Ht 5' 4" (1 626 m)   Wt 60 8 kg (134 lb)   LMP 01/02/2019 (LMP Unknown)   BMI 23 00 kg/m²          Physical Exam  Constitutional:       Appearance: She is well-developed  HENT:      Head: Normocephalic and atraumatic  Eyes:      Conjunctiva/sclera: Conjunctivae normal       Pupils: Pupils are equal, round, and reactive to light  Cardiovascular:      Rate and Rhythm: Normal rate and regular rhythm  Pulmonary:      Effort: Pulmonary effort is normal       Breath sounds: Normal breath sounds  Abdominal:      General: Bowel sounds are normal       Palpations: Abdomen is soft  Comments: Abdominal exam as described above   Musculoskeletal:         General: Normal range of motion  Cervical back: Normal range of motion and neck supple  Skin:     General: Skin is warm and dry  Neurological:      Mental Status: She is alert and oriented to person, place, and time     Psychiatric:         Behavior: Behavior normal          Thought Content:  Thought content normal          Judgment: Judgment normal

## 2021-06-21 NOTE — ASSESSMENT & PLAN NOTE
Patient returns for 1 year's scheduled follow-up status post laparoscopic repair of a large lower midline ventral incisional hernia following the treatment of her ovarian carcinoma  Today the patient voiced no new complaints with regards to her wound  The patient does note an occasional pain in the mid abdomen which is generally self-limited and resolved spontaneously  She notes no new lumps or masses that would suggest the presence of a recurrent hernia  She has resume normal levels of activity including still water kayaking  We also discussed the recent death of her father  On physical examination the patient is a pleasant well-nourished well-developed female  She is in no acute distress  Awake alert oriented  Competent reliable as a historian  Inspection and palpation of her abdomen reveals it to be flat  I appreciate no signs of recurrent hernia on interrogation of the repair  The patient appears well and clinically stable status post laparoscopic repair of a complex lower midline ventral incisional hernia  I have encouraged her to continue to enjoy her life in engage in normal levels of activity as she feels fit  I look for to seeing her back in a year's time  She has been encouraged to follow up sooner on an as-needed basis

## 2021-06-21 NOTE — PROGRESS NOTES
Assessment/Plan:    Incisional hernia, without obstruction or gangrene    Patient returns for 1 year's scheduled follow-up status post laparoscopic repair of a large lower midline ventral incisional hernia following the treatment of her ovarian carcinoma  Today the patient voiced no new complaints with regards to her wound  The patient does note an occasional pain in the mid abdomen which is generally self-limited and resolved spontaneously  She notes no new lumps or masses that would suggest the presence of a recurrent hernia  She has resume normal levels of activity including still water kayaking  We also discussed the recent death of her father  On physical examination the patient is a pleasant well-nourished well-developed female  She is in no acute distress  Awake alert oriented  Competent reliable as a historian  Inspection and palpation of her abdomen reveals it to be flat  I appreciate no signs of recurrent hernia on interrogation of the repair  The patient appears well and clinically stable status post laparoscopic repair of a complex lower midline ventral incisional hernia  I have encouraged her to continue to enjoy her life in engage in normal levels of activity as she feels fit  I look for to seeing her back in a year's time  She has been encouraged to follow up sooner on an as-needed basis  Subjective:      Patient ID: Kiara Art is a 46 y o  female  Patient is here today for a one year follow up s/p lap incisional hernia repair 01/21/2020  Stated that she is doing well and denied any new problems or concerns  No fever or chills  Teena Lopez MA          Review of Systems   Constitutional: Negative for chills and fever  HENT: Negative for ear pain and sore throat  Eyes: Negative for pain and visual disturbance  Respiratory: Negative for cough and shortness of breath  Cardiovascular: Negative for chest pain and palpitations  Gastrointestinal: Negative for abdominal pain and vomiting  Genitourinary: Negative for dysuria and hematuria  Musculoskeletal: Negative for arthralgias and back pain  Skin: Negative for color change and rash  Neurological: Negative for seizures and syncope  All other systems reviewed and are negative  Objective:      /82 (BP Location: Left arm, Patient Position: Sitting, Cuff Size: Standard)   Pulse 72   Temp (!) 97 3 °F (36 3 °C) (Temporal)   Resp 18   Ht 5' 4" (1 626 m)   Wt 60 8 kg (134 lb)   LMP 01/02/2019 (LMP Unknown)   BMI 23 00 kg/m²          Physical Exam  Constitutional:       Appearance: She is well-developed  HENT:      Head: Normocephalic and atraumatic  Eyes:      Conjunctiva/sclera: Conjunctivae normal       Pupils: Pupils are equal, round, and reactive to light  Cardiovascular:      Rate and Rhythm: Normal rate and regular rhythm  Pulmonary:      Effort: Pulmonary effort is normal       Breath sounds: Normal breath sounds  Abdominal:      General: Bowel sounds are normal       Palpations: Abdomen is soft  Comments: Abdominal exam as described above   Musculoskeletal:         General: Normal range of motion  Cervical back: Normal range of motion and neck supple  Skin:     General: Skin is warm and dry  Neurological:      Mental Status: She is alert and oriented to person, place, and time  Psychiatric:         Behavior: Behavior normal          Thought Content:  Thought content normal          Judgment: Judgment normal

## 2021-06-24 ENCOUNTER — HOSPITAL ENCOUNTER (OUTPATIENT)
Dept: MRI IMAGING | Facility: HOSPITAL | Age: 51
Discharge: HOME | End: 2021-06-24
Attending: SURGERY
Payer: COMMERCIAL

## 2021-06-24 DIAGNOSIS — K76.89 FOCAL NODULAR HYPERPLASIA OF LIVER: ICD-10-CM

## 2021-06-24 PROCEDURE — A9581 GADOXETATE DISODIUM INJ: HCPCS | Performed by: SURGERY

## 2021-06-24 PROCEDURE — 74183 MRI ABD W/O CNTR FLWD CNTR: CPT

## 2021-06-24 RX ADMIN — GADOXETATE DISODIUM 6 ML: 181.43 INJECTION, SOLUTION INTRAVENOUS at 11:38

## 2021-06-30 ENCOUNTER — OFFICE VISIT (OUTPATIENT)
Dept: SURGICAL ONCOLOGY | Facility: CLINIC | Age: 51
End: 2021-06-30
Payer: COMMERCIAL

## 2021-06-30 VITALS
WEIGHT: 135 LBS | HEIGHT: 64 IN | TEMPERATURE: 96.9 F | DIASTOLIC BLOOD PRESSURE: 70 MMHG | SYSTOLIC BLOOD PRESSURE: 136 MMHG | OXYGEN SATURATION: 99 % | RESPIRATION RATE: 17 BRPM | HEART RATE: 63 BPM | BODY MASS INDEX: 23.05 KG/M2

## 2021-06-30 DIAGNOSIS — R16.0 LIVER MASS: Primary | ICD-10-CM

## 2021-06-30 PROCEDURE — 99213 OFFICE O/P EST LOW 20 MIN: CPT | Performed by: NURSE PRACTITIONER

## 2021-06-30 PROCEDURE — 1036F TOBACCO NON-USER: CPT | Performed by: NURSE PRACTITIONER

## 2021-06-30 PROCEDURE — 3008F BODY MASS INDEX DOCD: CPT | Performed by: NURSE PRACTITIONER

## 2021-06-30 NOTE — PROGRESS NOTES
Surgical Oncology Follow Up       [address] [facility],[address]  [facility]  [address] [id number]    [place] Cut[place]  1970  [id number]      Chief Complaint   Patient presents with    Follow-up       Assessment/Plan:  1  Liver mass  - Stable FNH per MRI  - f/u PRN      Discussion/Summary:  Patient is a 24-year-old female who presents today for a follow-up visit for focal nodular hyperplasia  She has a history of stage I C ovarian carcinoma  Her  remains within normal limits  She denies abdominal complaints  She had an MRI of her abdomen with [facility] which revealed two stable benign FNH lesions  No further follow-up of these lesions is needed  Patient will continue to follow with her gyn oncologist for ovarian cancer surveillance  She knows to contact us in the future with any concerns  All of her questions were answered today  History of Present Illness:     -Interval History:  Patient denies abdominal pain, nausea, vomiting or diarrhea  She underwent an incisional hernia repair  She had an MRI which revealed hyper enhancing lesions in segment VII - 2 cm and segment VI- 7 mm  These are stable and consistent with benign focal nodular hyperplasia  No new or suspicious hepatic masses  She continues close follow-up with her gyn oncologist for ovarian cancer surveillance  Review of Systems:  Review of Systems   Constitutional: Negative for chills, fatigue and fever  Respiratory: Negative for cough and shortness of breath  Cardiovascular: Negative for chest pain  Gastrointestinal: Negative for abdominal pain, constipation, diarrhea, nausea and vomiting  Hematological: Negative for adenopathy  Psychiatric/Behavioral: Negative for confusion         Patient Active Problem List   Diagnosis    History of ovarian cancer    Encounter for care related to vascular access port    Dyslipidemia    Impaired fasting glucose    Screening for breast cancer    Hepatic cyst    Liver mass    Encounter for general adult medical examination with abnormal findings    Focal nodular hyperplasia of liver    Screening for HIV without presence of risk factors     Past Medical History:   Diagnosis Date    Asthma     Ovarian cancer ([address] ) 2019    Ovarian cyst      Past Surgical History:   Procedure Laterality Date    COLONOSCOPY      OVARIAN CYST SURGERY      SC LAP, VENTRAL HERNIA REPAIR,REDUCIBLE N/A 1/21/2020    Procedure: LAPAROSCOPIC VENTRAL HERNIA REPAIR WITH MESH;  Surgeon: Yanick Garvey MD;  Location: [facility] MAIN OR;  Service: General    SC TOTAL ABDOM HYSTERECTOMY N/A 5/16/2019    Procedure: TOTAL ABDOMINAL HYSTERECTOMY, BILATERAL SALPINGO-OOPHORECTOMY, PELVIC LYMPHNODE DISSECTION, RADICAL OMENTECTOMY, STAGING BIOPSIES;  Surgeon: Pilar Pittman MD;  Location:  MAIN OR;  Service: Gynecology Oncology    TONSILLECTOMY       Family History   Problem Relation Age of Onset    Diabetes Mother     Pancreatic cancer Mother 62    Diabetes Father     Cancer Father         bladder    No Known Problems Maternal Grandmother     No Known Problems Maternal Grandfather     No Known Problems Paternal Grandmother     Bone cancer Paternal Grandfather     Prostate cancer Paternal Grandfather     Brain cancer Maternal Aunt     No Known Problems Maternal Aunt      Social History     Socioeconomic History    Marital status: /Civil Union     Spouse name: Not on file    Number of children: Not on file    Years of education: Not on file    Highest education level: Not on file   Occupational History    Not on file   Tobacco Use    Smoking status: Never Smoker    Smokeless tobacco: Never Used   Vaping Use    Vaping Use: Never used   Substance and Sexual Activity    Alcohol use: Not Currently     Alcohol/week: 0 0 standard drinks    Drug use: No    Sexual activity: Yes     Partners: Male     Comment:    Other Topics Concern    Not on file   Social History Narrative    Not on file     Social Determinants of Health     Financial Resource Strain:     Difficulty of Paying Living Expenses:    Food Insecurity:     Worried About Running Out of Food in the Last Year:     [address] in the Last Year:    Transportation Needs:     Lack of Transportation (Medical):  Lack of Transportation (Non-Medical):    Physical Activity:     Days of Exercise per Week:     Minutes of Exercise per Session:    Stress:     Feeling of Stress :    Social Connections:     Frequency of Communication with Friends and Family:     Frequency of Social Gatherings with Friends and Family:     Attends Sikh Services:     Active Member of Clubs or Organizations:     Attends Club or Organization Meetings:     Marital Status:    Intimate Partner Violence:     Fear of Current or Ex-Partner:     Emotionally Abused:     Physically Abused:     Sexually Abused:        Current Outpatient Medications:     ascorbic acid (VITAMIN C) 250 mg tablet, Take 250 mg by mouth daily, Disp: , Rfl:     Cod Liver Oil (COD LIVER PO), Take 1 Dose by mouth 3 (three) times a week , Disp: , Rfl:     MAGNESIUM BISGLYCINATE PO, , Disp: , Rfl:     multivitamin (THERAGRAN) TABS, Take 1 tablet by mouth daily, Disp: , Rfl:     OLIVE LEAF PO, Take 300 mg by mouth daily, Disp: , Rfl:     Probiotic Product (PROBIOTIC PO), Take 1 capsule by mouth daily , Disp: , Rfl:     TURMERIC PO, Take by mouth 2 (two) times a day , Disp: , Rfl:   Allergies   Allergen Reactions    Penicillins      Childhood reaction     Vitals:    06/30/21 1251   BP: 136/70   Pulse: 63   Resp: 17   Temp: (!) 96 9 °F (36 1 °C)   SpO2: 99%       Physical Exam  Constitutional:       Appearance: Normal appearance  HENT:      Head: Normocephalic and atraumatic  Cardiovascular:      Rate and Rhythm: Normal rate and regular rhythm     Pulmonary:      Effort: Pulmonary effort is normal       Breath sounds: Normal breath sounds  Abdominal:      General: A surgical scar is present  There is no distension  Palpations: Abdomen is soft  There is no mass  Tenderness: There is no abdominal tenderness  There is no guarding  Neurological:      Mental Status: She is alert  Results:    Imaging  MRI abdomen w wo contrast    Result Date: 6/28/2021  Narrative: MRI - ABDOMEN - WITH AND WITHOUT CONTRAST INDICATION:  Multiple liver lesions  History of stage IC ovarian cancer  COMPARISON: MRIs dated 6/15/2020 and 12/31/2019  CTs dated 1/6/2020 and 12/24/2019  TECHNIQUE:  The following pulse sequences were obtained prior to and following the administration of intravenous contrast:     Coronal and axial T2 with TE of 90 and 180 respectively, axial T2 with fat saturation, axial FIESTA fat-sat, axial T1-weighted in-and-out-of phase, axial DWI/ADC, precontrast axial T1 LAVA with fat saturation, post-contrast dynamic axial T1 LAVA with fat saturation at 20, 70, and 180 seconds, axial LAVA T1 fat sat 10 minute delay, and coronal T1 LAVA fat sat hepatocellular phase  (19 minute delay) IV Contrast:  6 mL of gadoxetate disodium SOLN FINDINGS: LOWER CHEST:   Unremarkable  LIVER: Liver size and contour are normal   Background T2 signal is normal and homogeneous  No steatosis or evidence for iron overload  Benign nonenhancing cyst measuring 8 mm in segment Slava  Early phase hyperenhancing lesions in segments VII (2 cm--9/28) and VI (7 mm--9/45) appear unchanged from prior show  These are isoenhancing to liver on later phases including hepatocellular phase  Findings remain consistent with benign focal nodular hyperplasia  Hepatic and portal veins are patent  Conventional branching pattern of the hepatic artery  BILE DUCTS: No intrahepatic or extrahepatic bile duct dilation  GALLBLADDER:  Normal  PANCREAS: Normal  No main pancreatic ductal dilation   ADRENAL GLANDS:  Normal  SPLEEN:  Normal  KIDNEYS/PROXIMAL URETERS: Renal collecting systems are decompressed  No suspicious renal masses or perinephric collections  BOWEL:   No dilated or inflamed loops of bowel  Stomach appears normal  PERITONEUM/RETROPERITONEUM: No ascites or encapsulated collections  No mesenteric soft tissue implants demonstrated  LYMPH NODES: No abdominal lymphadenopathy  VASCULAR STRUCTURES:  Abdominal aorta and branch vessels appear normal in configuration and caliber  No aneurysm  No central venous thromboses demonstrated  ABDOMINAL WALL:  Surgical changes  Supraumbilical diastasis recti  OSSEOUS STRUCTURES:  No suspicious osseous lesion  Impression: Unchanged areas of focal nodular hyperplasia in the right hepatic lobe  These are unchanged since 12/24/2019  No new or suspicious hepatic masses  Workstation performed: [id number]       I reviewed the above imaging data  Advance Care Planning/Advance Directives:  Discussed disease status and treatment goals with the patient

## 2021-11-02 ENCOUNTER — OFFICE VISIT (OUTPATIENT)
Dept: FAMILY MEDICINE CLINIC | Facility: CLINIC | Age: 51
End: 2021-11-02
Payer: COMMERCIAL

## 2021-11-02 VITALS
BODY MASS INDEX: 23.44 KG/M2 | HEART RATE: 59 BPM | WEIGHT: 137.3 LBS | DIASTOLIC BLOOD PRESSURE: 90 MMHG | SYSTOLIC BLOOD PRESSURE: 144 MMHG | TEMPERATURE: 97.8 F | HEIGHT: 64 IN | OXYGEN SATURATION: 98 %

## 2021-11-02 DIAGNOSIS — R73.01 IMPAIRED FASTING GLUCOSE: Primary | ICD-10-CM

## 2021-11-02 DIAGNOSIS — Z12.31 ENCOUNTER FOR SCREENING MAMMOGRAM FOR MALIGNANT NEOPLASM OF BREAST: ICD-10-CM

## 2021-11-02 DIAGNOSIS — Z85.43 HISTORY OF OVARIAN CANCER: ICD-10-CM

## 2021-11-02 DIAGNOSIS — E78.5 DYSLIPIDEMIA: ICD-10-CM

## 2021-11-02 DIAGNOSIS — C56.9 MALIGNANT NEOPLASM OF OVARY, UNSPECIFIED LATERALITY (HCC): ICD-10-CM

## 2021-11-02 DIAGNOSIS — R03.0 ELEVATED BLOOD PRESSURE READING IN OFFICE WITHOUT DIAGNOSIS OF HYPERTENSION: ICD-10-CM

## 2021-11-02 DIAGNOSIS — D68.69 HYPERCOAGULABLE STATE, SECONDARY (HCC): ICD-10-CM

## 2021-11-02 PROCEDURE — 99214 OFFICE O/P EST MOD 30 MIN: CPT | Performed by: FAMILY MEDICINE

## 2021-11-02 PROCEDURE — 3008F BODY MASS INDEX DOCD: CPT | Performed by: FAMILY MEDICINE

## 2021-11-02 PROCEDURE — 3725F SCREEN DEPRESSION PERFORMED: CPT | Performed by: FAMILY MEDICINE

## 2021-11-02 PROCEDURE — 1036F TOBACCO NON-USER: CPT | Performed by: FAMILY MEDICINE

## 2021-12-02 ENCOUNTER — PATIENT MESSAGE (OUTPATIENT)
Dept: GYNECOLOGIC ONCOLOGY | Facility: CLINIC | Age: 51
End: 2021-12-02

## 2021-12-02 ENCOUNTER — TELEPHONE (OUTPATIENT)
Dept: GYNECOLOGIC ONCOLOGY | Facility: CLINIC | Age: 51
End: 2021-12-02

## 2021-12-08 ENCOUNTER — OFFICE VISIT (OUTPATIENT)
Dept: GYNECOLOGIC ONCOLOGY | Facility: CLINIC | Age: 51
End: 2021-12-08
Payer: COMMERCIAL

## 2021-12-08 VITALS
RESPIRATION RATE: 18 BRPM | WEIGHT: 140 LBS | BODY MASS INDEX: 23.9 KG/M2 | HEART RATE: 86 BPM | DIASTOLIC BLOOD PRESSURE: 82 MMHG | TEMPERATURE: 97.6 F | SYSTOLIC BLOOD PRESSURE: 122 MMHG | HEIGHT: 64 IN

## 2021-12-08 DIAGNOSIS — Z85.43 HISTORY OF OVARIAN CANCER: ICD-10-CM

## 2021-12-08 DIAGNOSIS — Z85.43 ENCOUNTER FOR FOLLOW-UP SURVEILLANCE OF OVARIAN CANCER: Primary | ICD-10-CM

## 2021-12-08 DIAGNOSIS — Z08 ENCOUNTER FOR FOLLOW-UP SURVEILLANCE OF OVARIAN CANCER: Primary | ICD-10-CM

## 2021-12-08 PROBLEM — Z00.01 ENCOUNTER FOR GENERAL ADULT MEDICAL EXAMINATION WITH ABNORMAL FINDINGS: Status: RESOLVED | Noted: 2020-02-18 | Resolved: 2021-12-08

## 2021-12-08 PROCEDURE — 1036F TOBACCO NON-USER: CPT | Performed by: NURSE PRACTITIONER

## 2021-12-08 PROCEDURE — 99213 OFFICE O/P EST LOW 20 MIN: CPT | Performed by: NURSE PRACTITIONER

## 2021-12-08 PROCEDURE — 3008F BODY MASS INDEX DOCD: CPT | Performed by: NURSE PRACTITIONER

## 2021-12-27 ENCOUNTER — HOSPITAL ENCOUNTER (OUTPATIENT)
Dept: MAMMOGRAPHY | Facility: HOSPITAL | Age: 51
Discharge: HOME | End: 2021-12-27
Attending: FAMILY MEDICINE
Payer: COMMERCIAL

## 2021-12-27 VITALS — HEIGHT: 60 IN | BODY MASS INDEX: 27.48 KG/M2 | WEIGHT: 139.99 LBS

## 2021-12-27 DIAGNOSIS — Z12.31 ENCOUNTER FOR SCREENING MAMMOGRAM FOR MALIGNANT NEOPLASM OF BREAST: ICD-10-CM

## 2021-12-27 PROCEDURE — 77063 BREAST TOMOSYNTHESIS BI: CPT

## 2021-12-27 PROCEDURE — 77067 SCR MAMMO BI INCL CAD: CPT

## 2022-03-25 ENCOUNTER — OFFICE VISIT (OUTPATIENT)
Dept: URGENT CARE | Facility: CLINIC | Age: 52
End: 2022-03-25
Payer: COMMERCIAL

## 2022-03-25 VITALS
RESPIRATION RATE: 18 BRPM | HEIGHT: 61 IN | WEIGHT: 146 LBS | TEMPERATURE: 97.5 F | OXYGEN SATURATION: 98 % | HEART RATE: 92 BPM | BODY MASS INDEX: 27.56 KG/M2

## 2022-03-25 DIAGNOSIS — J02.9 SORE THROAT: Primary | ICD-10-CM

## 2022-03-25 LAB — S PYO AG THROAT QL: NEGATIVE

## 2022-03-25 PROCEDURE — 87880 STREP A ASSAY W/OPTIC: CPT | Performed by: PHYSICIAN ASSISTANT

## 2022-03-25 PROCEDURE — 87636 SARSCOV2 & INF A&B AMP PRB: CPT | Performed by: PHYSICIAN ASSISTANT

## 2022-03-25 PROCEDURE — 87070 CULTURE OTHR SPECIMN AEROBIC: CPT | Performed by: PHYSICIAN ASSISTANT

## 2022-03-25 PROCEDURE — 99213 OFFICE O/P EST LOW 20 MIN: CPT | Performed by: PHYSICIAN ASSISTANT

## 2022-03-25 NOTE — PROGRESS NOTES
[facility]    NAME: Jesus Olson is a 46 y o  female  : 1970    MRN: [id number]  DATE: 2022  TIME: 2:39 PM    Assessment and Plan   Sore throat [J02 9]  1  Sore throat  POCT rapid strepA    Throat culture    Covid/Flu-Office Collect       Patient Instructions     Patient Instructions   Infection appears viral   Recommend symptomatic treatment  Can take ibuprofen or tylenol as needed for pain or fever  Over the counter cough and cold medications to help with symptoms  Use salt water gargles for sore throat and throat lozenges  Cough drops as needed  Wash hands frequently to prevent the spread of infection  If not improving over the next 7-10 days, follow up with PCP  Symptoms may persist for 10-14 days  Will rule out strep/covid/flu  Chief Complaint     Chief Complaint   Patient presents with    Sore Throat     sinus congestion had since wednesday        History of Present Illness   46year old female here with complaint of sore throat, nasal congestion, sinus congestion for the past 3 days  No fever, chills  Review of Systems   Review of Systems   Constitutional: Negative for appetite change, chills and fever  HENT: Positive for congestion, postnasal drip, sinus pressure and sore throat  Negative for ear discharge, ear pain, facial swelling and sneezing  Respiratory: Positive for cough  Negative for shortness of breath and wheezing  Neurological: Negative for headaches         Current Medications     Current Outpatient Medications:     ascorbic acid (VITAMIN C) 250 mg tablet, Take 250 mg by mouth daily, Disp: , Rfl:     Cod Liver Oil (COD LIVER PO), Take 1 Dose by mouth 3 (three) times a week , Disp: , Rfl:     MAGNESIUM BISGLYCINATE PO, , Disp: , Rfl:     multivitamin (THERAGRAN) TABS, Take 1 tablet by mouth daily, Disp: , Rfl:     OLIVE LEAF PO, Take 300 mg by mouth daily, Disp: , Rfl:     Probiotic Product (PROBIOTIC PO), Take 1 capsule by mouth daily , Disp: , Rfl:     TURMERIC PO, Take by mouth 2 (two) times a day , Disp: , Rfl:     Current Allergies     Allergies as of 03/25/2022 - Reviewed 03/25/2022   Allergen Reaction Noted    Penicillins  01/02/2019          The following portions of the patient's history were reviewed and updated as appropriate: allergies, current medications, past family history, past medical history, past social history, past surgical history and problem list    Past Medical History:   Diagnosis Date    Asthma     Ovar[facility] [address] ) 2019    Ovarian cyst      Past Surgical History:   Procedure Laterality Date    COLONOSCOPY      OVARIAN CYST SURGERY      [place] LAP, VENTRAL HERNIA REPAIR,REDUCIBLE N/A 1/21/2020    Procedure: LAPAROSCOPIC VENTRAL HERNIA REPAIR WITH MESH;  Surgeon: Bella Barnes MD;  Location: [address] [place] [address] MAIN OR;  Service: General    CA TOTAL ABDOM HYSTERECTOMY N/A 5/16/2019    Procedure: TOTAL ABDOMINAL HYSTERECTOMY, BILATERAL SALPINGO-OOPHORECTOMY, PELVIC LYMPHNODE DISSECTION, RADICAL OMENTECTOMY, STAGING BIOPSIES;  Surgeon: Stacey See MD;  Location:  MAIN OR;  Service: Gynecology Oncology    TONSILLECTOMY       Family History   Problem Relation Age of Onset    Diabetes Mother     Pancreatic cancer Mother 62    Diabetes Father     Cancer Father         Bladder cancer    No Known Problems Maternal Grandmother     No Known Problems Maternal Grandfather     No Known Problems Paternal Grandmother     Bone cancer Paternal Grandfather     Prostate cancer Paternal Grandfather     Brain cancer Maternal Aunt     No Known Problems Maternal Aunt      Social History     Socioeconomic History    Marital status: /Civil Union     Spouse name: Not on file    Number of children: Not on file    Years of education: Not on file    Highest education level: Not on file   Occupational History    Not on file   Tobacco Use    Smoking status: Never Smoker    Smokeless tobacco: Never Used   Vaping Use  Vaping Use: Never used   Substance and Sexual Activity    Alcohol use: Not Currently     Alcohol/week: 0 0 standard drinks    Drug use: No    Sexual activity: Yes     Partners: Male     Birth control/protection: None     Comment:    Other Topics Concern    Not on file   Social History Narrative    Not on file     Social Determinants of Health     Financial Resource Strain: Not on file   Food Insecurity: Not on file   Transportation Needs: Not on file   Physical Activity: Not on file   Stress: Not on file   Social Connections: Not on file   Intimate Partner Violence: Not on file   Housing Stability: Not on file     Medications have been verified  Objective   Pulse 92   Temp 97 5 °F (36 4 °C)   Resp 18   Ht 5' 1" (1 549 m)   Wt 66 2 kg (146 lb)   LMP 01/02/2019 (LMP Unknown)   SpO2 98%   BMI 27 59 kg/m²      Physical Exam   Physical Exam  Vitals and nursing note reviewed  Constitutional:       General: She is not in acute distress  Appearance: She is well-developed  HENT:      Head: Normocephalic and atraumatic  Right Ear: Tympanic membrane normal       Left Ear: Tympanic membrane normal       Nose: Congestion present  No mucosal edema or rhinorrhea  Right Sinus: No maxillary sinus tenderness or frontal sinus tenderness  Left Sinus: No maxillary sinus tenderness or frontal sinus tenderness  Mouth/Throat:      Pharynx: Posterior oropharyngeal erythema present  No oropharyngeal exudate  Eyes:      Conjunctiva/sclera: Conjunctivae normal    Cardiovascular:      Rate and Rhythm: Normal rate and regular rhythm  Heart sounds: Normal heart sounds  No murmur heard

## 2022-03-26 LAB
FLUAV RNA RESP QL NAA+PROBE: NEGATIVE
FLUBV RNA RESP QL NAA+PROBE: NEGATIVE
SARS-COV-2 RNA RESP QL NAA+PROBE: NEGATIVE

## 2022-03-27 LAB — BACTERIA THROAT CULT: NORMAL

## 2022-03-28 ENCOUNTER — TELEPHONE (OUTPATIENT)
Dept: URGENT CARE | Facility: CLINIC | Age: 52
End: 2022-03-28

## 2022-03-28 DIAGNOSIS — R05.9 COUGH: Primary | ICD-10-CM

## 2022-03-28 RX ORDER — ALBUTEROL SULFATE 90 UG/1
2 AEROSOL, METERED RESPIRATORY (INHALATION) EVERY 4 HOURS PRN
Qty: 18 G | Refills: 0 | Status: SHIPPED | OUTPATIENT
Start: 2022-03-28

## 2022-03-28 NOTE — TELEPHONE ENCOUNTER
Patient called requesting albuterol inhaler  Has been using nebulizer but could use another inhaler  Will send to pharmacy

## 2022-04-27 ENCOUNTER — RA CDI HCC (OUTPATIENT)
Dept: OTHER | Facility: HOSPITAL | Age: 52
End: 2022-04-27

## 2022-04-27 NOTE — PROGRESS NOTES
[facility]  [facility] opportunities       Chart reviewed, no opportunity found: CHART REVIEWED, NO OPPORTUNITY FOUND        Patients Insurance        Commercial Insurance: [address] [place] [address]

## 2022-05-24 ENCOUNTER — TELEPHONE (OUTPATIENT)
Dept: SURGERY | Facility: CLINIC | Age: 52
End: 2022-05-24

## 2022-05-24 NOTE — TELEPHONE ENCOUNTER
Called patient and left a message to give us a call to reschedule for another day with Dr Merlyn Plummer or keep appointment but moved to [place]

## 2022-05-27 ENCOUNTER — TELEMEDICINE (OUTPATIENT)
Dept: FAMILY MEDICINE CLINIC | Facility: CLINIC | Age: 52
End: 2022-05-27
Payer: COMMERCIAL

## 2022-05-27 VITALS
SYSTOLIC BLOOD PRESSURE: 132 MMHG | TEMPERATURE: 99.6 F | WEIGHT: 137 LBS | BODY MASS INDEX: 25.86 KG/M2 | HEIGHT: 61 IN | DIASTOLIC BLOOD PRESSURE: 84 MMHG

## 2022-05-27 DIAGNOSIS — U07.1 COVID-19 VIRUS INFECTION: Primary | ICD-10-CM

## 2022-05-27 PROCEDURE — 3008F BODY MASS INDEX DOCD: CPT | Performed by: FAMILY MEDICINE

## 2022-05-27 PROCEDURE — 1036F TOBACCO NON-USER: CPT | Performed by: FAMILY MEDICINE

## 2022-05-27 PROCEDURE — 99213 OFFICE O/P EST LOW 20 MIN: CPT | Performed by: FAMILY MEDICINE

## 2022-05-27 PROCEDURE — 87811 SARS-COV-2 COVID19 W/OPTIC: CPT | Performed by: FAMILY MEDICINE

## 2022-05-27 NOTE — ASSESSMENT & PLAN NOTE
Patient has COVID-19 virus infection  She did home test on herself on Wednesday, when her symptoms 1st began  At that time, her symptoms did him out too much  She had loss of appetite and chills, nothing more  Her 's positive  I suspect she was positive  Because of the holiday weekend, I did want to do a PCR test   Patient is at risk for progression of disease  She is unvaccinated, she has ovarian cancer, and she has asthma  We have a narrow window for treatment  As such, I had the patient come to the office today for a COVID-19 rapid test   She was positive  I am going to recommend treatment with Paxlovid  The patient is familiar with this medication as her  is currently taking it  I advised the patient to take vitamin-C, vitamin-D, and zinc take her temperature regularly  I advised her there is a 5 day quarantine  She needs to isolate at home but she does not need to isolate from her   I did tell the patient after her 5 day quarantine, she needs to wear mask for 5 additional days any time she is around anyone else  We also discussed getting vaccinated  I told her I have a lot of patients who have gotten his virus more than once

## 2022-05-27 NOTE — PROGRESS NOTES
COVID-19 Outpatient Progress Note    Assessment/Plan:    Problem List Items Addressed This Visit        Other    COVID-19 virus infection - Primary     Patient has COVID-19 virus infection  She did home test on herself on Wednesday, when her symptoms 1st began  At that time, her symptoms did him out too much  She had loss of appetite and chills, nothing more  Her 's positive  I suspect she was positive  Because of the holiday weekend, I did want to do a PCR test   Patient is at risk for progression of disease  She is unvaccinated, she has ovarian cancer, and she has asthma  We have a narrow window for treatment  As such, I had the patient come to the office today for a COVID-19 rapid test   She was positive  I am going to recommend treatment with Paxlovid  The patient is familiar with this medication as her  is currently taking it  I advised the patient to take vitamin-C, vitamin-D, and zinc take her temperature regularly  I advised her there is a 5 day quarantine  She needs to isolate at home but she does not need to isolate from her   I did tell the patient after her 5 day quarantine, she needs to wear mask for 5 additional days any time she is around anyone else  We also discussed getting vaccinated  I told her I have a lot of patients who have gotten his virus more than once  Relevant Medications    nirmatrelvir & ritonavir (Paxlovid) tablet therapy pack    Other Relevant Orders    POCT Rapid Covid Ag (Completed)         Disposition:     Patient has been advised to come to our office for a COVID-19 rapid test    I have spent 18 minutes directly with the patient  Greater than 50% of this time was spent in counseling/coordination of care regarding: instructions for management, patient and family education and impressions        Encounter provider Rachell Iverson DO    Provider located at [address] [place] [address] [address] [facility] [id number] [phone]    Recent Visits  Date Type Provider Dept   05/27/22 Telemedicine Brie Lee DO [facility]   Showing recent visits within past 7 days and meeting all other requirements  Future Appointments  No visits were found meeting these conditions  Showing future appointments within next 150 days and meeting all other requirements     This virtual check-in was done via [facility] and patient was informed that this is a secure, HIPAA-compliant platform  She agrees to proceed  Patient agrees to participate in a virtual check in via telephone or video visit instead of presenting to the office to address urgent/immediate medical needs  Patient is aware this is a billable service  After connecting through [facility], the patient was identified by name and date of birth  Manoj Thomas was informed that this was a telemedicine visit and that the exam was being conducted confidentially over secure lines  Manoj Thomas acknowledged consent and understanding of privacy and security of the telemedicine visit  I informed the patient that I have reviewed her record in Epic and presented the opportunity for her to ask any questions regarding the visit today  The patient agreed to participate  Verification of patient location:  Patient is located in the following state in which I hold an active license: [place]    Subjective:   Manoj Thomas is a 45 y o  female who is concerned about COVID-19  Patient's symptoms include fever, chills, sore throat, cough, myalgias and headache  Patient denies fatigue, malaise, congestion, rhinorrhea, anosmia, loss of taste, shortness of breath, chest tightness, abdominal pain, nausea, vomiting and diarrhea       - Date of symptom onset: 5/25/2022  - Date of exposure: 5/24/2022      COVID-19 vaccination status: Not vaccinated    Exposure:   Contact with a person who is under investigation (PUI) for or who is positive for COVID-19 within the last 14 days?: Yes    Hospitalized recently for fever and/or lower respiratory symptoms?: No      Currently a healthcare worker that is involved in direct patient care?: No      Works in a special setting where the risk of COVID-19 transmission may be high? (this may include long-term care, correctional and senior living facilities; homeless shelters; assisted-living facilities and group homes ): No      Resident in a special setting where the risk of COVID-19 transmission may be high? (this may include long-term care, correctional and senior living facilities; homeless shelters; assisted-living facilities and group homes ): No      Lab Results   Component Value Date    SARSCOV2 Negative 03/25/2022    SARSCOVAG Positive (A) 05/31/2022     Past Medical History:   Diagnosis Date    Asthma     Ovarian cancer ([facility] [address] ) 2019    Ovarian cyst      Past Surgical History:   Procedure Laterality Date    COLONOSCOPY      OVARIAN CYST SURGERY      VT LAP, VENTRAL HERNIA REPAIR,REDUCIBLE N/A 1/21/2020    Procedure: LAPAROSCOPIC VENTRAL HERNIA REPAIR WITH MESH;  Surgeon: Venkatesh Bloom MD;  Location: [facility] MAIN OR;  Service: General    VT TOTAL ABDOM HYSTERECTOMY N/A 5/16/2019    Procedure: TOTAL ABDOMINAL HYSTERECTOMY, BILATERAL SALPINGO-OOPHORECTOMY, PELVIC LYMPHNODE DISSECTION, RADICAL OMENTECTOMY, STAGING BIOPSIES;  Surgeon: Sofia Terrell MD;  Location: [facility] MAIN OR;  Service: Gynecology Oncology    TONSILLECTOMY       Current Outpatient Medications   Medication Sig Dispense Refill    ascorbic acid (VITAMIN C) 250 mg tablet Take 250 mg by mouth daily      Cod Liver Oil (COD LIVER PO) Take 1 Dose by mouth 3 (three) times a week       MAGNESIUM BISGLYCINATE PO       multivitamin (THERAGRAN) TABS Take 1 tablet by mouth daily      nirmatrelvir & ritonavir (Paxlovid) tablet therapy pack Take 3 tablets by mouth 2 (two) times a day for 5 days Take 2 nirmatrelvir tablets + 1 ritonavir tablet together per dose 30 tablet 0    OLIVE LEAF PO Take 300 mg by mouth daily      Probiotic Product (PROBIOTIC PO) Take 1 capsule by mouth daily       TURMERIC PO Take by mouth 2 (two) times a day       albuterol (Ventolin HFA) 90 mcg/act inhaler Inhale 2 puffs every 4 (four) hours as needed for wheezing or shortness of breath (Patient not taking: Reported on 5/27/2022) 18 g 0     No current facility-administered medications for this visit  Allergies   Allergen Reactions    Penicillins      Childhood reaction       Review of Systems   Constitutional: Positive for appetite change, chills and fever  Negative for fatigue  HENT: Positive for sinus pressure and sore throat  Negative for congestion and rhinorrhea  Respiratory: Positive for cough  Negative for chest tightness, shortness of breath and wheezing  Gastrointestinal: Negative for abdominal pain, diarrhea, nausea and vomiting  Musculoskeletal: Positive for myalgias  Neurological: Positive for headaches  Objective:    Vitals:    05/27/22 0856   BP: 132/84   Temp: 99 6 °F (37 6 °C)   Weight: 62 1 kg (137 lb)   Height: 5' 1" (1 549 m)       Physical Exam  Vitals reviewed  Constitutional:       Comments: This is a 57-year-old white female who appears his stated age  The patient is nonseptic in appearance and does not appear to be in any respiratory distress   HENT:      Head: Normocephalic and atraumatic  Right Ear: External ear normal       Left Ear: External ear normal       Mouth/Throat:      Mouth: Mucous membranes are moist       Pharynx: Oropharynx is clear  No oropharyngeal exudate or posterior oropharyngeal erythema  Eyes:      General: No scleral icterus  Right eye: No discharge  Left eye: No discharge  Conjunctiva/sclera: Conjunctivae normal    Cardiovascular:      Rate and Rhythm: Normal rate and regular rhythm  Pulmonary:      Comments: Patient did not cough during his entire video visit  He was not tachypneic    He did not appear to be in any respiratory distress  Lymphadenopathy:      Cervical: No cervical adenopathy  VIRTUAL VISIT DISCLAIMER    Barb Mell Toussaint verbally agrees to participate in [employer]  Pt is aware that [employer] could be limited without vital signs or the ability to perform a full hands-on physical exam  Ida Guerra understands she or the provider may request at any time to terminate the video visit and request the patient to seek care or treatment in person

## 2022-05-31 LAB
SARS-COV-2 AG UPPER RESP QL IA: POSITIVE
VALID CONTROL: ABNORMAL

## 2022-06-14 ENCOUNTER — OFFICE VISIT (OUTPATIENT)
Dept: GYNECOLOGIC ONCOLOGY | Facility: CLINIC | Age: 52
End: 2022-06-14
Payer: COMMERCIAL

## 2022-06-14 VITALS
WEIGHT: 139 LBS | TEMPERATURE: 97.3 F | HEIGHT: 61 IN | HEART RATE: 67 BPM | BODY MASS INDEX: 26.24 KG/M2 | DIASTOLIC BLOOD PRESSURE: 84 MMHG | SYSTOLIC BLOOD PRESSURE: 136 MMHG | RESPIRATION RATE: 18 BRPM | OXYGEN SATURATION: 98 %

## 2022-06-14 DIAGNOSIS — C56.9 MALIGNANT NEOPLASM OF OVARY, UNSPECIFIED LATERALITY (HCC): Primary | ICD-10-CM

## 2022-06-14 DIAGNOSIS — Z85.43 HISTORY OF OVARIAN CANCER: ICD-10-CM

## 2022-06-14 PROCEDURE — 3008F BODY MASS INDEX DOCD: CPT | Performed by: OBSTETRICS & GYNECOLOGY

## 2022-06-14 PROCEDURE — 1036F TOBACCO NON-USER: CPT | Performed by: OBSTETRICS & GYNECOLOGY

## 2022-06-14 PROCEDURE — 99213 OFFICE O/P EST LOW 20 MIN: CPT | Performed by: OBSTETRICS & GYNECOLOGY

## 2022-06-14 NOTE — ASSESSMENT & PLAN NOTE
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

## 2022-06-14 NOTE — PROGRESS NOTES
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 status post surgical debulking 3 years ago  She has no evidence of recurrence of disease at this time   is presently pending  Provided this is within the normal limits will see her back in 6 months for repeat evaluation with  at that time  Other Visit Diagnoses     Malignant neoplasm of ovary, unspecified laterality ([facility] [address] )    -  Primary    Relevant Orders                CHIEF COMPLAINT:  Surveillance ovarian cancer      Problem:  Cancer Staging  History of ovarian cancer  Staging form: Ovary, Fallopian Tube, Primary Peritoneal, AJCC 8th Edition  - Clinical stage from 5/28/2019: FIGO Stage I (cT1, cN0, cM0) - Signed by Iker Mistry MD on 5/28/2019  - Pathologic stage from 5/28/2019: FIGO Stage IC1, calculated as Stage IC (pT1c1, pN0, cM0) - Signed by Iker Mistry MD on 5/28/2019        Previous therapy:  Oncology History   History of ovarian cancer   5/17/2019 Initial Diagnosis    Malignant neoplasm of left ovary ([facility] [address] )     5/17/2019 Surgery    Patient underwent total abdominal hysterectomy bilateral salpingo oophorectomy radical omentectomy bilateral pelvic lymphadenectomy and staging procedure for 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       5/28/2019 -  Cancer Staged    Staging form: Ovary, Fallopian Tube, Primary Peritoneal, AJCC 8th Edition  - Pathologic stage from 5/28/2019: FIGO Stage IC1, calculated as Stage IC (pT1c1, pN0, cM0) - Signed by Iker Mistry MD on 5/28/2019  Histologic grade (G): G3  Histologic grading system: 4 grade system  Residual tumor volume after primary cytoreductive surgery: No gross tumor  Stage used in treatment planning: Yes  National guidelines used in treatment planning: Yes       5/28/2019 -  Cancer Staged Staging form: Ovary, Fallopian Tube, Primary Peritoneal, AJCC 8th Edition  - Clinical stage from 5/28/2019: FIGO Stage I (cT1, cN0, cM0) - Signed by Gloria May MD on 5/28/2019  Stage used in treatment planning: Yes  National guidelines used in treatment planning: Yes        Chemotherapy    Carbo platinum area under the curve of 6 paclitaxel 175 milligrams/meter squared for 3-6 cycles  Patient refused treatment           Patient ID: Nhung Lockwood is a 45 y o  female  Patient is very pleasant 26-year-old female history of 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 status post exploratory laparotomy, total abdominal hysterectomy bilateral salpingo-oophorectomy omentectomy and staging procedure in approximately June of 2019  The patient was recommended follow-up chemotherapy with 3 is 6 cycles use  She has been disease free ever since  She presents today for surveillance  The patient has recently been diagnosed with COVID in did not have her blood testing done  Aside from her recent COVID infection the patient is otherwise doing well  Today, the patient is doing well  She denies significant abdominal pain, pelvic pain, nausea, vomiting, constipation, diarrhea, fevers, chills, or vaginal bleeding  The patient is up-to-date with mammograms having undergone screening in November of 2021      The following portions of the patient's history were reviewed and updated as appropriate: allergies, current medications, past family history, past social history, past surgical history and problem list     Review of Systems   Constitutional: Negative  HENT: Negative  Eyes: Negative  Respiratory: Negative  Cardiovascular: Negative  Gastrointestinal: Negative  Endocrine: Negative  Genitourinary: Negative  Musculoskeletal: Negative  Skin: Negative  Neurological: Negative  Hematological: Negative  Psychiatric/Behavioral: Negative          Current Outpatient Medications   Medication Sig Dispense Refill    ascorbic acid (VITAMIN C) 250 mg tablet Take 250 mg by mouth daily      Cod Liver Oil (COD LIVER PO) Take 1 Dose by mouth 3 (three) times a week       MAGNESIUM BISGLYCINATE PO       multivitamin (THERAGRAN) TABS Take 1 tablet by mouth daily      OLIVE LEAF PO Take 300 mg by mouth daily      Probiotic Product (PROBIOTIC PO) Take 1 capsule by mouth daily       TURMERIC PO Take by mouth 2 (two) times a day       albuterol (Ventolin HFA) 90 mcg/act inhaler Inhale 2 puffs every 4 (four) hours as needed for wheezing or shortness of breath 18 g 0     No current facility-administered medications for this visit  Objective:    Blood pressure 136/84, pulse 67, temperature (!) 97 3 °F (36 3 °C), resp  rate 18, height 5' 1" (1 549 m), weight 63 kg (139 lb), last menstrual period 01/02/2019, SpO2 98 %  Body mass index is 26 26 kg/m²  Body surface area is 1 62 meters squared  Physical Exam  Constitutional:       Appearance: She is well-developed  HENT:      Head: Normocephalic and atraumatic  Neck:      Thyroid: No thyromegaly  Cardiovascular:      Rate and Rhythm: Normal rate and regular rhythm  Heart sounds: Normal heart sounds  Pulmonary:      Effort: Pulmonary effort is normal       Breath sounds: Normal breath sounds  Abdominal:      General: Bowel sounds are normal       Palpations: Abdomen is soft  Comments: Well healed laparoscopic incisions  Genitourinary:     Comments: -Normal external female genitalia, normal Bartholin's and Bosworth's glands                  -Normal midline urethral meatus   No lesions notes                  -Bladder without fullness mass or tenderness                  -Vagina without lesion or discharge No significant cystocele or rectocele noted                  -Cervix surgically absent                  -Uterus surgically absent                  -Adnexae surgically absent                  - Anus without fissure of lesion    Musculoskeletal:         General: Normal range of motion  Cervical back: Normal range of motion and neck supple  Lymphadenopathy:      Cervical: No cervical adenopathy  Skin:     General: Skin is warm and dry  Neurological:      Mental Status: She is alert and oriented to person, place, and time     Psychiatric:         Behavior: Behavior normal          Lab Results   Component Value Date     7 3 02/22/2021     Lab Results   Component Value Date     10/23/2014    K 3 6 02/22/2021     02/22/2021    CO2 29 02/22/2021    ANIONGAP 9 10/23/2014    BUN 12 02/22/2021    CREATININE 0 80 02/22/2021    GLUCOSE 132 10/23/2014    GLUF 109 (H) 02/22/2021    CALCIUM 9 2 02/22/2021    AST 23 02/22/2021    ALT 42 02/22/2021    ALKPHOS 59 02/22/2021    PROT 8 3 (H) 10/23/2014    BILITOT 0 2 10/23/2014    EGFR 85 02/22/2021     Lab Results   Component Value Date    WBC 7 61 12/24/2019    HGB 13 4 12/24/2019    HCT 39 0 12/24/2019    MCV 92 12/24/2019     12/24/2019     Lab Results   Component Value Date    NEUTROABS 3 99 12/24/2019        Trend:  Lab Results   Component Value Date     7 3 02/22/2021     6 3 11/30/2020     5 3 08/24/2020     5 0 05/21/2020     4 3 12/27/2019     4 9 09/16/2019     45 4 (H) 05/10/2019

## 2022-06-28 LAB — HBA1C MFR BLD HPLC: 5.9 %

## 2022-07-01 ENCOUNTER — OFFICE VISIT (OUTPATIENT)
Dept: FAMILY MEDICINE CLINIC | Facility: CLINIC | Age: 52
End: 2022-07-01
Payer: COMMERCIAL

## 2022-07-01 VITALS
HEIGHT: 61 IN | WEIGHT: 140.9 LBS | OXYGEN SATURATION: 95 % | BODY MASS INDEX: 26.6 KG/M2 | DIASTOLIC BLOOD PRESSURE: 80 MMHG | HEART RATE: 87 BPM | TEMPERATURE: 98 F | SYSTOLIC BLOOD PRESSURE: 126 MMHG

## 2022-07-01 DIAGNOSIS — Z85.43 HISTORY OF OVARIAN CANCER: ICD-10-CM

## 2022-07-01 DIAGNOSIS — E78.5 DYSLIPIDEMIA: ICD-10-CM

## 2022-07-01 DIAGNOSIS — J45.20 MILD INTERMITTENT ASTHMA, UNCOMPLICATED: Primary | ICD-10-CM

## 2022-07-01 DIAGNOSIS — Z11.59 ENCOUNTER FOR HEPATITIS C SCREENING TEST FOR LOW RISK PATIENT: ICD-10-CM

## 2022-07-01 DIAGNOSIS — D68.69 HYPERCOAGULABLE STATE, SECONDARY (HCC): ICD-10-CM

## 2022-07-01 DIAGNOSIS — M67.432 GANGLION CYST OF DORSUM OF LEFT WRIST: ICD-10-CM

## 2022-07-01 DIAGNOSIS — R73.01 IMPAIRED FASTING GLUCOSE: ICD-10-CM

## 2022-07-01 PROCEDURE — 99214 OFFICE O/P EST MOD 30 MIN: CPT | Performed by: FAMILY MEDICINE

## 2022-07-01 NOTE — ASSESSMENT & PLAN NOTE
Patient has a ganglion cyst of the left wrist   I explained the diagnosis to her  At this time, she is not interested in a surgical approach

## 2022-07-01 NOTE — ASSESSMENT & PLAN NOTE
Patient has mild intermittent asthma  She has been doing well for a number of years now, only taking an albuterol inhaler p r n  Armani Johansen Prior to that, she did require maintenance inhaler  Now, with the cats, she is finding she is using her albuterol inhaler daily  This is only over the past 2 weeks, since she got the cats  For now, continue albuterol p r n   If she finds she continues to use her inhaler frequently, meaning more than twice a week, then she should contact me and I am going to have to start her back on a maintenance inhaler

## 2022-07-01 NOTE — PATIENT INSTRUCTIONS
Asthma   WHAT YOU NEED TO KNOW:   Asthma is a lung disease that makes breathing difficult  Chronic inflammation and reactions to triggers narrow the airways in the lungs  Asthma can become life-threatening if it is not managed  DISCHARGE INSTRUCTIONS:   Call your local emergency number (911 in the [address] [facility]) if:   You have severe shortness of breath  The skin around your neck and ribs pulls in with each breath  Your peak flow numbers are in the red zone of your AAP  Return to the emergency department if:   You have shortness of breath, even after you take your short-term medicine as directed  Your lips or nails turn blue or gray  Call your doctor or asthma specialist if:   You run out of medicine before your next refill is due  Your symptoms get worse  You need to take more medicine than usual to control your symptoms  You have questions or concerns about your condition or care  Medicines:   Medicines  may be used to decrease inflammation, open airways, and make it easier to breathe  Medicines may be inhaled, taken as a pill, or injected  Short-term medicines relieve your symptoms quickly  Long-term medicines are used to prevent future asthma attacks  Other medicines may be needed if your regular medicines are not able to prevent attacks  You may also need medicine to help control your allergies  Ask your healthcare provider for more information about any medicine you are given and how to take it safely  Take your medicine as directed  Contact your healthcare provider if you think your medicine is not helping or if you have side effects  Tell him of her if you are allergic to any medicine  Keep a list of the medicines, vitamins, and herbs you take  Include the amounts, and when and why you take them  Bring the list or the pill bottles to follow-up visits  Carry your medicine list with you in case of an emergency  Manage your symptoms and prevent future attacks:    Follow your Asthma Action Plan (AAP)  This is a written plan that you and your healthcare provider create  It explains which medicine you need and when to change doses if necessary  It also explains how you can monitor symptoms and use a peak flow meter  The meter measures how well your lungs are working  Manage other health conditions , such as allergies, acid reflux, and sleep apnea  Identify and avoid triggers  These may include pets, dust mites, mold, and cockroaches  Do not smoke or be around others who smoke  Nicotine and other chemicals in cigarettes and cigars can cause lung damage  Ask your healthcare provider for information if you currently smoke and need help to quit  E-cigarettes or smokeless tobacco still contain nicotine  Talk to your healthcare provider before you use these products  Ask about the flu vaccine  The flu can make your asthma worse  You may need a yearly flu shot  Follow up with your healthcare provider as directed: You will need to return to make sure your medicine is working and your symptoms are controlled  You may be referred to an asthma or allergy specialist  Marian Gemma may be asked to keep a record of your peak flow values and bring it with you to your appointments  Write down your questions so you remember to ask them during your visits  © Copyright [employer] 2022 Information is for End User's use only and may not be sold, redistributed or otherwise used for commercial purposes  All illustrations and images included in CareNotes® are the copyrighted property of [employer]  or Jaleel Mcdowell  The above information is an  only  It is not intended as medical advice for individual conditions or treatments  Talk to your doctor, nurse or pharmacist before following any medical regimen to see if it is safe and effective for you

## 2022-07-01 NOTE — PROGRESS NOTES
Assessment/Plan:    Dyslipidemia  I reviewed with the patient her lipid panel  Total cholesterol was 193  LDL cholesterol is 110  HDL cholesterol was 65  Patient is going to try to lose weight  I encouraged her to begin her exercise program again  Impaired fasting glucose  Fasting blood sugar was 99  Hemoglobin A1c is 5 9  I am going to continue to follow the patient  I ordered repeat fasting blood work for her next office visit  Mild intermittent asthma, uncomplicated  Patient has mild intermittent asthma  She has been doing well for a number of years now, only taking an albuterol inhaler p r n  Jose Alfredo Ingles Prior to that, she did require maintenance inhaler  Now, with the cats, she is finding she is using her albuterol inhaler daily  This is only over the past 2 weeks, since she got the cats  For now, continue albuterol p r n   If she finds she continues to use her inhaler frequently, meaning more than twice a week, then she should contact me and I am going to have to start her back on a maintenance inhaler  Ganglion cyst of dorsum of left wrist  Patient has a ganglion cyst of the left wrist   I explained the diagnosis to her  At this time, she is not interested in a surgical approach  BMI Counseling: Body mass index is 26 62 kg/m²  The BMI is above normal  Nutrition recommendations include moderation in carbohydrate intake  Exercise recommendations include exercising 3-5 times per week  Diagnoses and all orders for this visit:    Mild intermittent asthma, uncomplicated    Dyslipidemia  -     Comprehensive metabolic panel; Future  -     Lipid panel; Future    Impaired fasting glucose  -     Hemoglobin A1C; Future    Encounter for hepatitis C screening test for low risk patient  -     Hepatitis C antibody;  Future    History of ovarian cancer  -     CBC and differential; Future    Hypercoagulable state, secondary ([address] )    Ganglion cyst of dorsum of left wrist          Subjective:      Patient ID: Aarti Lee Rick Griggs is a 45 y o  female  A patient is a 77-year-old white female who presents to the office today for her routine checkup  The patient is doing well and has no complaints  She does tell me that she recently got 2 kittens  She is allergic to cats  She is now using her rescue inhaler once a day  This is been over the past 2 weeks  She tells me she got through [employer] without any problems  She denies any sequelae from her COVID-19 virus infection  She does complain of a lump on her left wrist that she wants me to check  Patient tells me that she had been following a very restrictive diet  She tells me she was following the diet recommended for her blood type as well as another diet which was based on a book to be cancer  Based upon these to diets, she tells me there was very few things that she could eat  She is going to be meeting with a nutritionist now to go over a diet  The following portions of the patient's history were reviewed and updated as appropriate: allergies, current medications, past family history, past medical history, past social history, past surgical history and problem list     Review of Systems   Constitutional: Positive for appetite change (Reports she stopped exercising after she got COVID)  Negative for activity change  Respiratory: Positive for wheezing  Negative for cough and shortness of breath  Cardiovascular: Negative for chest pain, palpitations and leg swelling  Gastrointestinal: Negative for abdominal distention, abdominal pain, blood in stool, constipation, diarrhea and nausea  Objective:      /80 (BP Location: Left arm, Patient Position: Sitting, Cuff Size: Adult)   Pulse 87   Temp 98 °F (36 7 °C) (Tympanic)   Ht 5' 1" (1 549 m)   Wt 63 9 kg (140 lb 14 4 oz)   LMP 01/02/2019 (LMP Unknown)   SpO2 95%   BMI 26 62 kg/m²          Physical Exam  Vitals reviewed     Constitutional:       Comments: Patient is a 77-year-old white female who appears her stated age  She is pleasant, cooperative, and in no distress   HENT:      Head: Normocephalic and atraumatic  Right Ear: Tympanic membrane, ear canal and external ear normal  There is no impacted cerumen  Left Ear: Tympanic membrane, ear canal and external ear normal  There is no impacted cerumen  Mouth/Throat:      Mouth: Mucous membranes are moist       Pharynx: No oropharyngeal exudate or posterior oropharyngeal erythema  Eyes:      General: No scleral icterus  Right eye: No discharge  Left eye: No discharge  Conjunctiva/sclera: Conjunctivae normal       Pupils: Pupils are equal, round, and reactive to light  Neck:      Comments: No thyromegaly noted  Cardiovascular:      Rate and Rhythm: Normal rate and regular rhythm  Heart sounds: Normal heart sounds  No murmur heard  No friction rub  No gallop  Pulmonary:      Effort: Pulmonary effort is normal  No respiratory distress  Breath sounds: Normal breath sounds  No stridor  No wheezing, rhonchi or rales  Abdominal:      General: Bowel sounds are normal  There is no distension  Palpations: Abdomen is soft  There is no mass  Tenderness: There is no abdominal tenderness  There is no guarding  Musculoskeletal:      Cervical back: Neck supple  Comments: There is a firm freely movable mass present on the dorsal aspect of the patient's left wrist   Lymphadenopathy:      Cervical: No cervical adenopathy  Psychiatric:         Mood and Affect: Mood normal          Behavior: Behavior normal          Thought Content:  Thought content normal          Judgment: Judgment normal        extremities:  Without cyanosis, clubbing, or edema

## 2022-07-01 NOTE — ASSESSMENT & PLAN NOTE
Fasting blood sugar was 99  Hemoglobin A1c is 5 9  I am going to continue to follow the patient  I ordered repeat fasting blood work for her next office visit

## 2022-07-01 NOTE — ASSESSMENT & PLAN NOTE
I reviewed with the patient her lipid panel  Total cholesterol was 193  LDL cholesterol is 110  HDL cholesterol was 65  Patient is going to try to lose weight  I encouraged her to begin her exercise program again

## 2022-09-20 ENCOUNTER — TELEPHONE (OUTPATIENT)
Dept: GYNECOLOGIC ONCOLOGY | Facility: CLINIC | Age: 52
End: 2022-09-20

## 2022-09-20 NOTE — TELEPHONE ENCOUNTER
Lm for pt to see if she could come in at 2pm instead of 2:30 on 12/14 due to provider needing to leave early

## 2022-09-23 ENCOUNTER — OFFICE VISIT (OUTPATIENT)
Dept: URGENT CARE | Facility: MEDICAL CENTER | Age: 52
End: 2022-09-23
Payer: COMMERCIAL

## 2022-09-23 VITALS
RESPIRATION RATE: 18 BRPM | OXYGEN SATURATION: 99 % | HEIGHT: 61 IN | HEART RATE: 69 BPM | WEIGHT: 146 LBS | TEMPERATURE: 97.8 F | BODY MASS INDEX: 27.56 KG/M2

## 2022-09-23 DIAGNOSIS — S61.452A CAT BITE OF LEFT HAND, INITIAL ENCOUNTER: Primary | ICD-10-CM
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
## 2022-09-23 PROCEDURE — 90471 IMMUNIZATION ADMIN: CPT | Performed by: NURSE PRACTITIONER

## 2022-09-23 PROCEDURE — 90714 TD VACC NO PRESV 7 YRS+ IM: CPT | Performed by: NURSE PRACTITIONER

## 2022-09-23 PROCEDURE — 99203 OFFICE O/P NEW LOW 30 MIN: CPT | Performed by: NURSE PRACTITIONER

## 2022-09-23 PROCEDURE — 90715 TDAP VACCINE 7 YRS/> IM: CPT | Performed by: NURSE PRACTITIONER

## 2022-09-23 NOTE — PATIENT INSTRUCTIONS
Apply neosporin and keep clean and dry, if you develop any increased pain, redness, swelling, drainage, fever, or any new or concerning symptoms please return or proceed to ER   Follow up with pcp in 3-5 days

## 2022-09-23 NOTE — PROGRESS NOTES
[facility]        NAME: Anna Petersen is a 45 y o  female  : 1970    MRN: [id number]  DATE: 2022  TIME: 3:51 PM    Assessment and Plan   Cat bite of left hand, initial encounter Moiset[facility] Greenberg  1  Cat bite of left hand, initial encounter  Tdap Vaccine greater than or equal to 8yo         Patient Instructions     Patient Instructions   Apply neosporin and keep clean and dry, if you develop any increased pain, redness, swelling, drainage, fever, or any new or concerning symptoms please return or proceed to ER  Follow up with pcp in 3-5 days       Follow up with PCP in 3-5 days  Proceed to  ER if symptoms worsen  Chief Complaint     Chief Complaint   Patient presents with    Animal Bite     To left hand last night, cat was licking her hand and she pulled hand away and cat latched on to hand          History of Present Illness       Patient is a 57-year-old female who presents with a scratch to her left hand  Patient states that last night her cat was licking her hand and she pulled her hand away and her cat's tooth scratched her hand  States today scratch looks as though it is healed  States my  made me come in    Patient is unsure of the date of her last tetanus shot  Patient states that cat is up-to-date on vaccinations  Denies any pain, swelling, redness  Denies any bleeding or drainage  Denies any fever, chills, or body aches  Review of Systems   Review of Systems   Constitutional: Negative for chills, diaphoresis, fatigue and fever  Respiratory: Negative for cough, chest tightness, shortness of breath, wheezing and stridor  Cardiovascular: Negative for chest pain and palpitations  Musculoskeletal: Negative for arthralgias, back pain, joint swelling and myalgias  Skin: Positive for wound  Negative for rash  Neurological: Negative            Current Medications       Current Outpatient Medications:     albuterol (Ventolin HFA) 90 mcg/act inhaler, Inhale 2 puffs every 4 (four) hours as needed for wheezing or shortness of breath, Disp: 18 g, Rfl: 0    ascorbic acid (VITAMIN C) 250 mg tablet, Take 250 mg by mouth daily, Disp: , Rfl:     Cod Liver Oil (COD LIVER PO), Take 1 Dose by mouth 3 (three) times a week , Disp: , Rfl:     MAGNESIUM BISGLYCINATE PO, , Disp: , Rfl:     multivitamin (THERAGRAN) TABS, Take 1 tablet by mouth daily, Disp: , Rfl:     OLIVE LEAF PO, Take 300 mg by mouth daily, Disp: , Rfl:     Probiotic Product (PROBIOTIC PO), Take 1 capsule by mouth daily , Disp: , Rfl:     TURMERIC PO, Take by mouth 2 (two) times a day , Disp: , Rfl:     Current Allergies     Allergies as of 09/23/2022 - Reviewed 09/23/2022   Allergen Reaction Noted    Penicillins  01/02/2019            The following portions of the patient's history were reviewed and updated as appropriate: allergies, current medications, past family history, past medical history, past social history, past surgical history and problem list      Past Medical History:   Diagnosis Date    Asthma     Ovarian cancer ([facility] ) 2019    Ovarian cyst        Past Surgical History:   Procedure Laterality Date    COLONOSCOPY      OVARIAN CYST SURGERY      MA LAP, VENTRAL HERNIA REPAIR,REDUCIBLE N/A 1/21/2020    Procedure: LAPAROSCOPIC VENTRAL HERNIA REPAIR WITH MESH;  Surgeon: Kady Heard MD;  Location: [facility] MAIN OR;  Service: General    MA TOTAL ABDOM HYSTERECTOMY N/A 5/16/2019    Procedure: TOTAL ABDOMINAL HYSTERECTOMY, BILATERAL SALPINGO-OOPHORECTOMY, PELVIC LYMPHNODE DISSECTION, RADICAL OMENTECTOMY, STAGING BIOPSIES;  Surgeon: Daniel Muller MD;  Location:  MAIN OR;  Service: Gynecology Oncology    TONSILLECTOMY         Family History   Problem Relation Age of Onset    Diabetes Mother     Pancreatic cancer Mother 62    Diabetes Father     Cancer Father         Bladder cancer    No Known Problems Maternal Grandmother     No Known Problems Maternal Grandfather     No Known Problems Paternal Grandmother     Bone cancer Paternal Grandfather     Prostate cancer Paternal Grandfather     Brain cancer Maternal Aunt     No Known Problems Maternal Aunt          Medications have been verified  Objective   Pulse 69   Temp 97 8 °F (36 6 °C)   Resp 18   Ht 5' 1" (1 549 m)   Wt 66 2 kg (146 lb)   LMP 01/02/2019 (LMP Unknown)   SpO2 99%   BMI 27 59 kg/m²   Patient's last menstrual period was 01/02/2019 (lmp unknown)  Physical Exam     Physical Exam  Constitutional:       General: She is not in acute distress  Appearance: Normal appearance  She is not diaphoretic  HENT:      Head: Normocephalic and atraumatic  Cardiovascular:      Rate and Rhythm: Normal rate and regular rhythm  Heart sounds: Normal heart sounds, S1 normal and S2 normal    Pulmonary:      Effort: Pulmonary effort is normal       Breath sounds: Normal breath sounds and air entry  Chest:   Breasts:      Left: No axillary adenopathy  Lymphadenopathy:      Upper Body:      Left upper body: No axillary adenopathy  Skin:     General: Skin is warm and dry  Capillary Refill: Capillary refill takes less than 2 seconds  Neurological:      Mental Status: She is alert

## 2022-10-20 ENCOUNTER — TELEPHONE (OUTPATIENT)
Dept: FAMILY MEDICINE CLINIC | Facility: CLINIC | Age: 52
End: 2022-10-20

## 2022-10-20 DIAGNOSIS — J45.30 MILD PERSISTENT ASTHMA, UNCOMPLICATED: Primary | ICD-10-CM

## 2022-10-20 DIAGNOSIS — R05.9 COUGH: ICD-10-CM

## 2022-10-20 RX ORDER — ALBUTEROL SULFATE 90 UG/1
2 AEROSOL, METERED RESPIRATORY (INHALATION) EVERY 4 HOURS PRN
Qty: 18 G | Refills: 0 | Status: SHIPPED | OUTPATIENT
Start: 2022-10-20 | End: 2023-02-15 | Stop reason: SDUPTHER

## 2022-10-20 NOTE — TELEPHONE ENCOUNTER
PATIENT CALLED TODAY ASKING FOR A REFILL ON HER RESCUE INHALER ALBUTEROL 90 MCG/ACT AND ALSO REQUESTING A DAILY INHALER DUE TO HAVING [facility]  [facility] RA IN [place]  PLEASE ADVISE?

## 2022-12-28 ENCOUNTER — HOSPITAL ENCOUNTER (OUTPATIENT)
Dept: MAMMOGRAPHY | Facility: HOSPITAL | Age: 52
Discharge: HOME | End: 2022-12-28
Attending: FAMILY MEDICINE

## 2022-12-28 VITALS — HEIGHT: 61 IN | WEIGHT: 146 LBS | BODY MASS INDEX: 27.56 KG/M2

## 2022-12-28 DIAGNOSIS — Z12.31 ENCOUNTER FOR SCREENING MAMMOGRAM FOR MALIGNANT NEOPLASM OF BREAST: ICD-10-CM

## 2023-01-06 ENCOUNTER — TELEPHONE (OUTPATIENT)
Dept: GYNECOLOGIC ONCOLOGY | Facility: CLINIC | Age: 53
End: 2023-01-06

## 2023-01-06 NOTE — TELEPHONE ENCOUNTER
Called to remind the patient to go get her lab work drawn for her upcoming appointment  Instructed the patient if she can not go this weekend to call the office and reschedule her appointment

## 2023-01-09 ENCOUNTER — TELEPHONE (OUTPATIENT)
Dept: CARDIAC SURGERY | Facility: CLINIC | Age: 53
End: 2023-01-09

## 2023-01-09 DIAGNOSIS — Z85.43 HISTORY OF OVARIAN CANCER: ICD-10-CM

## 2023-01-09 DIAGNOSIS — C56.9 MALIGNANT NEOPLASM OF OVARY, UNSPECIFIED LATERALITY (HCC): Primary | ICD-10-CM

## 2023-01-09 NOTE — TELEPHONE ENCOUNTER
Pt calling in regarding appointment with Dr Zaire Delgado tomorrow at 1:15 pm  She states the order for  was not put in and she won't have the results for that by tomorrow  She is wondering if she will need to reschedule or not  Pt can be reached at [phone]

## 2023-01-10 ENCOUNTER — OFFICE VISIT (OUTPATIENT)
Dept: GYNECOLOGIC ONCOLOGY | Facility: CLINIC | Age: 53
End: 2023-01-10

## 2023-01-10 VITALS
BODY MASS INDEX: 27.94 KG/M2 | HEART RATE: 71 BPM | RESPIRATION RATE: 18 BRPM | OXYGEN SATURATION: 99 % | DIASTOLIC BLOOD PRESSURE: 80 MMHG | WEIGHT: 148 LBS | TEMPERATURE: 98.1 F | HEIGHT: 61 IN | SYSTOLIC BLOOD PRESSURE: 116 MMHG

## 2023-01-10 DIAGNOSIS — C56.9 MALIGNANT NEOPLASM OF OVARY, UNSPECIFIED LATERALITY (HCC): Primary | ICD-10-CM

## 2023-01-10 DIAGNOSIS — Z85.43 HISTORY OF OVARIAN CANCER: ICD-10-CM

## 2023-01-10 NOTE — PROGRESS NOTES
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 status postsurgery alone in 2019  Patient presents today in surveillance  She is without complaint  Her exam is unremarkable  Her  is still pending  All in all the patient appears to be in a clinical remission  She has had her mammogram performed which was unremarkable last week  Patient will follow-up in 6 months for repeat   Other Visit Diagnoses     Malignant neoplasm of ovary, unspecified laterality ([facility] [address] )    -  Primary    Relevant Orders                CHIEF COMPLAINT: Surveillance stage Ic ovarian cancer      Problem:  Cancer Staging   History of ovarian cancer  Staging form: Ovary, Fallopian Tube, Primary Peritoneal, AJCC 8th Edition  - Clinical stage from 5/28/2019: FIGO Stage I (cT1, cN0, cM0) - Signed by Rodrick Cortez MD on 5/28/2019  - Pathologic stage from 5/28/2019: FIGO Stage IC1, calculated as Stage IC (pT1c1, pN0, cM0) - Signed by Rodrick Cortez MD on 5/28/2019        Previous therapy:  Oncology History   History of ovarian cancer   5/17/2019 Initial Diagnosis    Malignant neoplasm of left ovary ([facility] [address] )     5/17/2019 Surgery    Patient underwent total abdominal hysterectomy bilateral salpingo oophorectomy radical omentectomy bilateral pelvic lymphadenectomy and staging procedure for 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       5/28/2019 -  Cancer Staged    Staging form: Ovary, Fallopian Tube, Primary Peritoneal, AJCC 8th Edition  - Pathologic stage from 5/28/2019: FIGO Stage IC1, calculated as Stage IC (pT1c1, pN0, cM0) - Signed by Rodrick Cortez MD on 5/28/2019  Histologic grade (G): G3  Histologic grading system: 4 grade system  Residual tumor volume after primary cytoreductive surgery: No gross tumor  Stage used in treatment planning: Yes  National guidelines used in treatment planning: Yes       5/28/2019 -  Cancer Staged    Staging form: Ovary, Fallopian Tube, Primary Peritoneal, AJCC 8th Edition  - Clinical stage from 5/28/2019: FIGO Stage I (cT1, cN0, cM0) - Signed by Rojelio Barrett MD on 5/28/2019  Stage used in treatment planning: Yes  National guidelines used in treatment planning: Yes        Chemotherapy    Carbo platinum area under the curve of 6 paclitaxel 175 milligrams/meter squared for 3-6 cycles  Patient refused treatment           Patient ID: Gemini Gao is a 45 y o  female  Patient is a very pleasant 69-year-old female with a history of stage Ic 1 clear cell carcinoma of the ovary status post robotic hysterectomy BSO and staging  She underwent surgical procedure was recommended chemotherapy  She chose not to undergo chemotherapy pathway and followed observation  Patient presents today in follow-up  Initial treatment was 5/2019  Today, the patient is doing well  She denies significant abdominal pain, pelvic pain, nausea, vomiting, constipation, diarrhea, fevers, chills, or vaginal bleeding  Patient has had her  recently drawn but it is not yet resulted  The following portions of the patient's history were reviewed and updated as appropriate: allergies, current medications, past medical history, past social history, past surgical history and problem list     Review of Systems   Constitutional: Negative  HENT: Negative  Eyes: Negative  Respiratory: Negative  Cardiovascular: Negative  Gastrointestinal: Negative  Endocrine: Negative  Genitourinary: Negative  Musculoskeletal: Negative  Skin: Negative  Neurological: Negative  Hematological: Negative  Psychiatric/Behavioral: Negative          Current Outpatient Medications   Medication Sig Dispense Refill   • albuterol (Ventolin HFA) 90 mcg/act inhaler Inhale 2 puffs every 4 (four) hours as needed for wheezing or shortness of breath 18 g 0   • ascorbic acid (VITAMIN C) 250 mg tablet Take 250 mg by mouth daily     • Cod Liver Oil (COD LIVER PO) Take 1 Dose by mouth 3 (three) times a week      • MAGNESIUM BISGLYCINATE PO      • multivitamin (THERAGRAN) TABS Take 1 tablet by mouth daily     • OLIVE LEAF PO Take 300 mg by mouth daily     • Probiotic Product (PROBIOTIC PO) Take 1 capsule by mouth daily      • TURMERIC PO Take by mouth 2 (two) times a day      • mometasone-formoterol (DULERA) 100-5 MCG/ACT inhaler Inhale 2 puffs 2 (two) times a day Rinse mouth after use  (Patient not taking: Reported on 1/10/2023) 13 g 2     No current facility-administered medications for this visit  Objective:    Blood pressure 116/80, pulse 71, temperature 98 1 °F (36 7 °C), temperature source Temporal, resp  rate 18, height 5' 1" (1 549 m), weight 67 1 kg (148 lb), last menstrual period 01/02/2019, SpO2 99 %  Body mass index is 27 96 kg/m²  Body surface area is 1 66 meters squared  Physical Exam  Constitutional:       Appearance: She is well-developed  HENT:      Head: Normocephalic and atraumatic  Neck:      Thyroid: No thyromegaly  Cardiovascular:      Rate and Rhythm: Normal rate and regular rhythm  Heart sounds: Normal heart sounds  Pulmonary:      Effort: Pulmonary effort is normal       Breath sounds: Normal breath sounds  Abdominal:      General: Bowel sounds are normal       Palpations: Abdomen is soft  Comments: Well healed laparoscopic incisions  Genitourinary:     Comments: -Normal external female genitalia, normal Bartholin's and [place]'s glands                  -Normal midline urethral meatus   No lesions notes                  -Bladder without fullness mass or tenderness                  -Vagina without lesion or discharge No significant cystocele or rectocele noted                  -Cervix surgically absent                  -Uterus surgically absent                  -Adnexae surgically absent                  - Anus without fissure of lesion    Musculoskeletal:         General: Normal range of motion  Cervical back: Normal range of motion and neck supple  Lymphadenopathy:      Cervical: No cervical adenopathy  Skin:     General: Skin is warm and dry  Neurological:      Mental Status: She is alert and oriented to person, place, and time     Psychiatric:         Behavior: Behavior normal          Lab Results   Component Value Date     7 3 02/22/2021     Lab Results   Component Value Date     10/23/2014    K 3 6 02/22/2021     02/22/2021    CO2 29 02/22/2021    ANIONGAP 9 10/23/2014    BUN 12 02/22/2021    CREATININE 0 80 02/22/2021    GLUCOSE 132 10/23/2014    GLUF 109 (H) 02/22/2021    CALCIUM 9 2 02/22/2021    AST 23 02/22/2021    ALT 42 02/22/2021    ALKPHOS 59 02/22/2021    PROT 8 3 (H) 10/23/2014    BILITOT 0 2 10/23/2014    EGFR 85 02/22/2021     Lab Results   Component Value Date    WBC 7 61 12/24/2019    HGB 13 4 12/24/2019    HCT 39 0 12/24/2019    MCV 92 12/24/2019     12/24/2019     Lab Results   Component Value Date    NEUTROABS 3 99 12/24/2019        Trend:  Lab Results   Component Value Date     7 3 02/22/2021     6 3 11/30/2020     5 3 08/24/2020     5 0 05/21/2020     4 3 12/27/2019     4 9 09/16/2019     45 4 (H) 05/10/2019

## 2023-01-10 NOTE — ASSESSMENT & PLAN NOTE
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

## 2023-01-25 ENCOUNTER — RA CDI HCC (OUTPATIENT)
Dept: OTHER | Facility: HOSPITAL | Age: 53
End: 2023-01-25

## 2023-01-25 NOTE — PROGRESS NOTES
[facility]  coding opportunities          Chart Reviewed number of suggestions sent to Provider: 1     Patients Insurance        Commercial Insurance: [employer]       [id number]:  Mild intermittent asthma without complication [[id number]]    Found in active problem list - please review and assess and document per MEAT if applicable for [id number]

## 2023-02-10 LAB
HBA1C MFR BLD HPLC: 6.1 %
HCV AB SER-ACNC: NORMAL

## 2023-02-15 ENCOUNTER — OFFICE VISIT (OUTPATIENT)
Dept: FAMILY MEDICINE CLINIC | Facility: CLINIC | Age: 53
End: 2023-02-15

## 2023-02-15 VITALS
TEMPERATURE: 97.9 F | HEIGHT: 61 IN | SYSTOLIC BLOOD PRESSURE: 126 MMHG | WEIGHT: 147.7 LBS | DIASTOLIC BLOOD PRESSURE: 84 MMHG | BODY MASS INDEX: 27.89 KG/M2 | OXYGEN SATURATION: 97 % | HEART RATE: 72 BPM

## 2023-02-15 DIAGNOSIS — R73.01 IMPAIRED FASTING GLUCOSE: ICD-10-CM

## 2023-02-15 DIAGNOSIS — Z00.00 ANNUAL PHYSICAL EXAM: Primary | ICD-10-CM

## 2023-02-15 DIAGNOSIS — R05.9 COUGH: ICD-10-CM

## 2023-02-15 RX ORDER — ALBUTEROL SULFATE 90 UG/1
2 AEROSOL, METERED RESPIRATORY (INHALATION) EVERY 4 HOURS PRN
Qty: 18 G | Refills: 1 | Status: SHIPPED | OUTPATIENT
Start: 2023-02-15

## 2023-02-15 NOTE — PROGRESS NOTES
ADULT ANNUAL [facility]    NAME: Kathy Dueñas  AGE: 45 y o  SEX: female  : 1970     DATE: 2/15/2023     Assessment and Plan:     Problem List Items Addressed This Visit        Endocrine    Impaired fasting glucose    Relevant Orders    Glucose, fasting    Hemoglobin A1C       Other    Annual physical exam - Primary     Patient presented to the office today for her annual physical exam   I reviewed and discussed with her her recent consultation with Dr Duglas Nguyen  I also reviewed the patient's  level with her and reassured her that it was completely normal   I reviewed her most recent labs  Her CBC was normal   Her fasting blood glucose was 112 and her hemoglobin A1c is 6 1  Fasting lipid panel showed a total cholesterol 198  Triglycerides were 76  HDL cholesterol 64  LDL cholesterol is 119  I checked her blood pressure myself and found her blood pressure to be 126/84  I noted a 7 pound weight gain  We reviewed her family history  Her mother and father were both diabetic  The patient and I spent a considerable amount of time discussing her impaired fasting glucose as well as diet  The patient read a book about insulin resistance  He also is of the book believe that insulin resistance can be combated by following a low-fat diet  I told the patient that although fat cells will cause insulin resistance, I do not think that she can beat her prediabetes by following a low-fat diet  She needs to lose weight  She needs to follow a low carbohydrate diet  She is going to try a low-fat diet  I am going to order a repeat fasting blood sugar and hemoglobin A1c in 3 months  I will see her back again in 6 months  I asked her to continue exercising  I reviewed her last colonoscopy  Immunizations and preventive care screenings were discussed with patient today   Appropriate education was printed on patient's after visit summary  Counseling:  · Exercise: the importance of regular exercise/physical activity was discussed  Recommend exercise 3-5 times per week for at least 30 minutes  BMI Counseling: Body mass index is 27 91 kg/m²  The BMI is above normal  Nutrition recommendations include decreasing portion sizes and moderation in carbohydrate intake  Exercise recommendations include exercising 3-5 times per week  Rationale for BMI follow-up plan is due to patient being overweight or obese  Depression Screening and Follow-up Plan: Patient was screened for depression during today's encounter  They screened negative with a PHQ-2 score of 0  Return in about 6 months (around 8/15/2023)  Chief Complaint:     Chief Complaint   Patient presents with   • Follow-up      History of Present Illness:     Adult Annual Physical   Patient here for a comprehensive physical exam  The patient reports no problems  Diet and Physical Activity  · Diet/Nutrition: well balanced diet  · Exercise: vigorous cardiovascular exercise and 3-4 times a week on average  Depression Screening  PHQ-2/9 Depression Screening    Little interest or pleasure in doing things: 0 - not at all  Feeling down, depressed, or hopeless: 0 - not at all  Trouble falling or staying asleep, or sleeping too much: 0 - not at all  Feeling tired or having little energy: 0 - not at all  Poor appetite or overeatin - not at all  Feeling bad about yourself - or that you are a failure or have let yourself or your family down: 0 - not at all  Trouble concentrating on things, such as reading the newspaper or watching television: 0 - not at all  Moving or speaking so slowly that other people could have noticed   Or the opposite - being so fidgety or restless that you have been moving around a lot more than usual: 0 - not at all  Thoughts that you would be better off dead, or of hurting yourself in some way: 0 - not at all  PHQ-2 Score: 0  PHQ-2 Interpretation: Negative depression screen  PHQ-9 Score: 0   PHQ-9 Interpretation: No or Minimal depression        General Health  · Sleep: gets 4-6 hours of sleep on average  · Hearing: normal - bilateral   · Vision: no vision problems  · Dental: regular dental visits  /GYN Health  · Patient is: postmenopausal  · Last menstrual period:   · Contraceptive method: not applicable       Review of Systems:     Review of Systems   Past Medical History:     Past Medical History:   Diagnosis Date   • Asthma    • Ovarian cancer ([facility] [address] ) 2019   • Ovarian cyst       Past Surgical History:     Past Surgical History:   Procedure Laterality Date   • COLONOSCOPY     • OVARIAN CYST SURGERY     • NC LAPS REPAIR HERNIA EXCEPT INCAL/INGUN REDUCIBLE N/A 1/21/2020    Procedure: LAPAROSCOPIC VENTRAL HERNIA REPAIR WITH MESH;  Surgeon: Tanya Tello MD;  Location: [address] [place] [address] MAIN OR;  Service: General   • NC TOTAL ABDOMINAL HYSTERECT W/WO RMVL TUBE OVARY N/A 5/16/2019    Procedure: TOTAL ABDOMINAL HYSTERECTOMY, BILATERAL SALPINGO-OOPHORECTOMY, PELVIC LYMPHNODE DISSECTION, RADICAL OMENTECTOMY, STAGING BIOPSIES;  Surgeon: Violeta Colon MD;  Location:  MAIN OR;  Service: Gynecology Oncology   • TONSILLECTOMY        Social History:     Social History     Socioeconomic History   • Marital status: /Civil Union     Spouse name: None   • Number of children: None   • Years of education: None   • Highest education level: None   Occupational History   • None   Tobacco Use   • Smoking status: Never   • Smokeless tobacco: Never   Vaping Use   • Vaping Use: Never used   Substance and Sexual Activity   • Alcohol use: Not Currently     Alcohol/week: 0 0 standard drinks   • Drug use: No   • Sexual activity: Yes     Partners: Male     Birth control/protection: None     Comment:    Other Topics Concern   • None   Social History Narrative   • None     Social Determinants of Health     Financial Resource Strain: Not on file   Food Insecurity: Not on file   Transportation Needs: Not on file   Physical Activity: Not on file   Stress: Not on file   Social Connections: Not on file   Intimate Partner Violence: Not on file   Housing Stability: Not on file      Family History:     Family History   Problem Relation Age of Onset   • Diabetes Mother    • Pancreatic cancer Mother 62   • Diabetes Father    • Cancer Father         Bladder cancer   • No Known Problems Maternal Grandmother    • No Known Problems Maternal Grandfather    • No Known Problems Paternal Grandmother    • Bone cancer Paternal Grandfather    • Prostate cancer Paternal Grandfather    • Brain cancer Maternal Aunt    • No Known Problems Maternal Aunt       Current Medications:     Current Outpatient Medications   Medication Sig Dispense Refill   • ascorbic acid (VITAMIN C) 250 mg tablet Take 250 mg by mouth daily     • Cod Liver Oil (COD LIVER PO) Take 1 Dose by mouth 3 (three) times a week      • MAGNESIUM BISGLYCINATE PO      • multivitamin (THERAGRAN) TABS Take 1 tablet by mouth daily     • Probiotic Product (PROBIOTIC PO) Take 1 capsule by mouth daily      • albuterol (Ventolin HFA) 90 mcg/act inhaler Inhale 2 puffs every 4 (four) hours as needed for wheezing or shortness of breath 18 g 1   • OLIVE LEAF PO Take 300 mg by mouth daily (Patient not taking: Reported on 2/15/2023)     • TURMERIC PO Take by mouth 2 (two) times a day  (Patient not taking: Reported on 2/15/2023)       No current facility-administered medications for this visit  Allergies: Allergies   Allergen Reactions   • Penicillins      Childhood reaction      Physical Exam:     /84   Pulse 72   Temp 97 9 °F (36 6 °C) (Tympanic)   Ht 5' 1" (1 549 m)   Wt 67 kg (147 lb 11 2 oz)   LMP 01/02/2019 (LMP Unknown)   SpO2 97%   BMI 27 91 kg/m²     Physical Exam  Vitals reviewed  Constitutional:       Comments: Patient is a 77-year-old white female who appears her stated age    She is pleasant, cooperative, and in no distress  HENT:      Head: Normocephalic and atraumatic  Right Ear: Tympanic membrane, ear canal and external ear normal  There is no impacted cerumen  Left Ear: Tympanic membrane, ear canal and external ear normal  There is no impacted cerumen  Mouth/Throat:      Mouth: Mucous membranes are moist       Pharynx: Oropharynx is clear  No oropharyngeal exudate or posterior oropharyngeal erythema  Eyes:      General: No scleral icterus  Right eye: No discharge  Left eye: No discharge  Conjunctiva/sclera: Conjunctivae normal       Pupils: Pupils are equal, round, and reactive to light  Neck:      Comments: No thyromegaly  Cardiovascular:      Rate and Rhythm: Normal rate and regular rhythm  Heart sounds: Normal heart sounds  No murmur heard  No friction rub  No gallop  Pulmonary:      Effort: Pulmonary effort is normal  No respiratory distress  Breath sounds: Normal breath sounds  No stridor  No wheezing, rhonchi or rales  Abdominal:      General: Bowel sounds are normal  There is no distension  Palpations: Abdomen is soft  There is no mass  Tenderness: There is no abdominal tenderness  There is no guarding  Hernia: No hernia is present  Comments: There is no hepatosplenomegaly   Musculoskeletal:      Cervical back: Neck supple  Lymphadenopathy:      Cervical: No cervical adenopathy  Psychiatric:         Mood and Affect: Mood normal          Behavior: Behavior normal          Thought Content:  Thought content normal          Judgment: Judgment normal         Extremities:  Without cyanosis, clubbing, or edema    DO ELIAS Galvez [facility] PRIMARY CARE

## 2023-02-16 NOTE — ASSESSMENT & PLAN NOTE
Patient presented to the office today for her annual physical exam   I reviewed and discussed with her her recent consultation with Dr Severino Regan  I also reviewed the patient's  level with her and reassured her that it was completely normal   I reviewed her most recent labs  Her CBC was normal   Her fasting blood glucose was 112 and her hemoglobin A1c is 6 1  Fasting lipid panel showed a total cholesterol 198  Triglycerides were 76  HDL cholesterol 64  LDL cholesterol is 119  I checked her blood pressure myself and found her blood pressure to be 126/84  I noted a 7 pound weight gain  We reviewed her family history  Her mother and father were both diabetic  The patient and I spent a considerable amount of time discussing her impaired fasting glucose as well as diet  The patient read a book about insulin resistance  He also is of the book believe that insulin resistance can be combated by following a low-fat diet  I told the patient that although fat cells will cause insulin resistance, I do not think that she can beat her prediabetes by following a low-fat diet  She needs to lose weight  She needs to follow a low carbohydrate diet  She is going to try a low-fat diet  I am going to order a repeat fasting blood sugar and hemoglobin A1c in 3 months  I will see her back again in 6 months  I asked her to continue exercising  I reviewed her last colonoscopy

## 2023-03-06 NOTE — DISCHARGE INSTRUCTIONS
Procedural Sedation   WHAT YOU NEED TO KNOW:   Procedural sedation is medicine used during procedures to help you feel relaxed and calm  You will remember little to none of the procedure  After sedation you may feel tired, weak, or unsteady on your feet  You may also have trouble concentrating or short-term memory loss  These symptoms should go away in 24 hours or less  DISCHARGE INSTRUCTIONS:   Call 911 or have someone else call for any of the following:   · You have sudden trouble breathing      · You cannot be woken  ·    Contact Interventional Radiology at [phone] PATIENTS: Contact Interventional Radiology at [phone] [facility] PATIENTS: Contact Interventional Radiology at [phone]) if any of the following occur:      · You have a severe headache or dizziness      · Your heart is beating faster than usual     · You have a fever or chills      · Your skin is itchy, swollen, or you have a rash      · You have nausea or are vomiting for more than 8 hours after the procedure       · You have questions or concerns about your condition or care  Self-care:   · Have someone stay with you for 24 hours  This person can drive you to errands and help you do things around the house  This person can also watch for problems       · Rest and do quiet activities for 24 hours  Do not exercise, ride a bike, or play sports  Stand up slowly to prevent dizziness and falls  Take short walks around the house with another person  Slowly return to your usual activities the next day       · Do not drive or use dangerous machines or tools for 24 hours  You may injure yourself or others  Examples include a lawnmower, saw, or drill  Do not return to work for 24 hours if you use dangerous machines or tools for work       · Do not make important decisions for 24 hours  For example, do not sign important papers or invest money       · Drink liquids as directed  Liquids help flush the sedation medicine out of your body  Ask how much liquid to drink each day and which liquids are best for you       · Eat small, frequent meals to prevent nausea and vomiting  Start with clear liquids such as juice or broth  If you do not vomit after clear liquids, you can eat your usual foods       · Do not drink alcohol or take medicines that make you drowsy  This includes medicines that help you sleep and anxiety medicines  Ask your healthcare provider if it is safe for you to take pain medicine  Follow up with your healthcare provider as directed: Write down your questions so you remember to ask them during your visits  Tumor Depth: Less than 6mm from granular layer and no invasion beyond the subcutaneous fat

## 2023-03-30 ENCOUNTER — HOSPITAL ENCOUNTER (EMERGENCY)
Facility: HOSPITAL | Age: 53
Discharge: HOME | End: 2023-03-30
Attending: EMERGENCY MEDICINE

## 2023-03-30 VITALS
OXYGEN SATURATION: 95 % | RESPIRATION RATE: 17 BRPM | DIASTOLIC BLOOD PRESSURE: 86 MMHG | SYSTOLIC BLOOD PRESSURE: 159 MMHG | HEART RATE: 102 BPM | TEMPERATURE: 97.4 F

## 2023-03-30 DIAGNOSIS — S61.001A AVULSION OF SKIN OF RIGHT THUMB, INITIAL ENCOUNTER: Primary | ICD-10-CM

## 2023-03-30 NOTE — ED PROVIDER NOTES
History  Chief Complaint   Patient presents with   • Finger Laceration     Bleeding thumb laceration from cutting potatoes      Patient is a 24-year-old female who presents today with right thumb laceration  Patient was using a mandolin to slice potatoes when she cut the tip of her thumb off  She had immediate pain and bleeding, applied pressure and presents to the ED for treatment  Patient denies any numbness, pain radiating down her right hand, any decreased range of motion of the thumb, are concerned that there is a foreign object embedded in the wound  Patient is not on anticoagulation  Had a tetanus shot last year after a cat bite  Prior to Admission Medications   Prescriptions Last Dose Informant Patient Reported? Taking?    Cod Liver Oil (COD LIVER PO)   Yes No   Sig: Take 1 Dose by mouth 3 (three) times a week    MAGNESIUM BISGLYCINATE PO   Yes No   OLIVE LEAF PO   Yes No   Sig: Take 300 mg by mouth daily   Patient not taking: Reported on 2/15/2023   Probiotic Product (PROBIOTIC PO)   Yes No   Sig: Take 1 capsule by mouth daily    TURMERIC PO   Yes No   Sig: Take by mouth 2 (two) times a day    Patient not taking: Reported on 2/15/2023   albuterol (Ventolin HFA) 90 mcg/act inhaler   No No   Sig: Inhale 2 puffs every 4 (four) hours as needed for wheezing or shortness of breath   ascorbic acid (VITAMIN C) 250 mg tablet   Yes No   Sig: Take 250 mg by mouth daily   multivitamin (THERAGRAN) TABS   Yes No   Sig: Take 1 tablet by mouth daily      Facility-Administered Medications: None       Past Medical History:   Diagnosis Date   • Asthma    • Ovarian cancer ([facility] [address] ) 2019   • Ovarian cyst        Past Surgical History:   Procedure Laterality Date   • COLONOSCOPY     • OVARIAN CYST SURGERY     • AK LAPS REPAIR HERNIA EXCEPT INCAL/INGUN REDUCIBLE N/A 1/21/2020    Procedure: LAPAROSCOPIC VENTRAL HERNIA REPAIR WITH MESH;  Surgeon: Maral Downs MD;  Location: [facility] MAIN OR;  Service: General   • AK TOTAL ABDOMINAL HYSTERECT W/WO RMVL TUBE OVARY N/A 5/16/2019    Procedure: TOTAL ABDOMINAL HYSTERECTOMY, BILATERAL SALPINGO-OOPHORECTOMY, PELVIC LYMPHNODE DISSECTION, RADICAL OMENTECTOMY, STAGING BIOPSIES;  Surgeon: Kady Mahoney MD;  Location: [facility] MAIN OR;  Service: Gynecology Oncology   • TONSILLECTOMY         Family History   Problem Relation Age of Onset   • Diabetes Mother    • Pancreatic cancer Mother 62   • Diabetes Father    • Cancer Father         Bladder cancer   • No Known Problems Maternal Grandmother    • No Known Problems Maternal Grandfather    • No Known Problems Paternal Grandmother    • Bone cancer Paternal Grandfather    • Prostate cancer Paternal Grandfather    • Brain cancer Maternal Aunt    • No Known Problems Maternal Aunt      I have reviewed and agree with the history as documented  E-Cigarette/Vaping   • E-Cigarette Use Never User      E-Cigarette/Vaping Substances   • Nicotine No    • THC No    • CBD No    • Flavoring No    • Other No    • Unknown No      Social History     Tobacco Use   • Smoking status: Never   • Smokeless tobacco: Never   Vaping Use   • Vaping Use: Never used   Substance Use Topics   • Alcohol use: Not Currently     Alcohol/week: 0 0 standard drinks   • Drug use: No       Review of Systems   Constitutional: Negative for fever  Respiratory: Negative for shortness of breath  Cardiovascular: Negative for chest pain  Musculoskeletal: Negative for gait problem  Skin: Positive for wound (Right thumb)  Negative for rash  Neurological: Negative for syncope and facial asymmetry  All other systems reviewed and are negative  Physical Exam  Physical Exam  Vitals and nursing note reviewed  Constitutional:       General: She is in acute distress (Right thumb pain)  Appearance: Normal appearance  She is not toxic-appearing or diaphoretic  HENT:      Head: Normocephalic and atraumatic        Right Ear: Hearing and external ear normal       Left Ear: Hearing and external ear normal       Nose: Nose normal    Eyes:      General: Lids are normal  No scleral icterus  Right eye: No discharge  Left eye: No discharge  Cardiovascular:      Rate and Rhythm: Normal rate  Pulmonary:      Effort: Pulmonary effort is normal  No respiratory distress  Musculoskeletal:         General: No tenderness, deformity or signs of injury  Hands:       Cervical back: Normal range of motion and neck supple  Skin:     General: Skin is warm and dry  Capillary Refill: Capillary refill takes less than 2 seconds  Coloration: Skin is not cyanotic, jaundiced or mottled  Findings: Wound present  No erythema or rash  Comments: Patient has an avulsion injury to the tip of the right thumb approximately 0 5 cm in diameter  Profuse bleeding from the wound, irrigated with normal saline, has a red base  No nailbed involvement  Patient has full range of motion of the DIP and MCP joint of the right thumb  Neurological:      General: No focal deficit present  Mental Status: She is alert and oriented to person, place, and time  Mental status is at baseline  Sensory: Sensation is intact  Gait: Gait normal       Comments: Patient has full sensation of the right thumb  2+ radial pulse  Psychiatric:         Attention and Perception: Attention normal          Mood and Affect: Mood normal          Speech: Speech normal          Behavior: Behavior normal  Behavior is cooperative  Thought Content:  Thought content normal          Vital Signs  ED Triage Vitals [03/30/23 1904]   Temperature Pulse Respirations Blood Pressure SpO2   (!) 97 4 °F (36 3 °C) (!) 107 18 159/86 97 %      Temp Source Heart Rate Source Patient Position - Orthostatic VS BP Location FiO2 (%)   Temporal Monitor Standing Left arm --      Pain Score       7           Vitals:    03/30/23 1904 03/30/23 1907   BP: 159/86 159/86   Pulse: (!) 107 102   Patient Position - Orthostatic VS: Standing Sitting         Visual Acuity      ED Medications  Medications - No data to display    Diagnostic Studies  Results Reviewed     None                 No orders to display              Procedures  Procedures         ED Course                                             Medical Decision Making  MDM      Patient with history as above presented with avulsion of tip of R thumb  History obtained from patient  Patient was nontoxic, stable  Ambulatory  Exam as above  Low suspicion for retained foreign object, deep laceration requiring suturing, contamination of wound, neurovascular compromise of the R thumb/hand, or other serious pathology requiring consultation with hand surgeon or requiring imaging/testing  Patient had a tetanus booster last year  JOSE not indicative of high contamination risk and patient is not immunocompromised that would necessitate antibiotics at this time  Surgicell applied to wound bed which did achieve hemostasis  Xeroform wrapped around wound, then gauze, then secured lightly with coban  Reviewed wound care with the patient, keep clean and dry, do not remove the surgicell, change the xeroform and gauze/coban daily, avoid wrapping too tightly, and keep hand elevated to reduce swelling and throbbing  Follow up with PCP early next week to monitor for wound healing  Reviewed s/s of infection and strict return to ED precautions given  Patient verbalized understanding and agreement with plan of care  May use tylenol for pain  Consideration was given for admission, but the patient was stable for outpatient management  Disposition:  Discharged with instructions to obtain outpatient follow up of patient's symptoms and findings, with strict return precautions if patient develops new or worsening symptoms          Avulsion of skin of right thumb, initial encounter: acute illness or injury      Disposition  Final diagnoses:   Avulsion of skin of right thumb, initial encounter     Time reflects when diagnosis was documented in both MDM as applicable and the Disposition within this note     Time User Action Codes Description Comment    3/30/2023  8:04 PM Humberto Klaus, April Add [[id number]] Avulsion of skin of right thumb, initial encounter       ED Disposition     ED Disposition   Discharge    Condition   Stable    Date/Time   u Mar 30, 2023  8:04 PM    Comment   Alexsurindervanessa Toussaint discharge to home/self care  Follow-up Information     Follow up With Specialties Details Why Sera,  Family Medicine Go in 1 week For follow up visit [facility] [address]  [address]  [phone]            Discharge Medication List as of 3/30/2023  8:08 PM      CONTINUE these medications which have NOT CHANGED    Details   albuterol (Ventolin HFA) 90 mcg/act inhaler Inhale 2 puffs every 4 (four) hours as needed for wheezing or shortness of breath, Starting Wed 2/15/2023, Normal      ascorbic acid (VITAMIN C) 250 mg tablet Take 250 mg by mouth daily, Historical Med      Cod Liver Oil (COD LIVER PO) Take 1 Dose by mouth 3 (three) times a week , Historical Med      MAGNESIUM BISGLYCINATE PO Starting Wed 1/1/2020, Historical Med      multivitamin (THERAGRAN) TABS Take 1 tablet by mouth daily, Historical Med      OLIVE LEAF PO Take 300 mg by mouth daily, Historical Med      Probiotic Product (PROBIOTIC PO) Take 1 capsule by mouth daily , Historical Med      TURMERIC PO Take by mouth 2 (two) times a day , Historical Med             No discharge procedures on file      PDMP Review       Value Time User    PDMP Reviewed  Yes 1/21/2020 11:02 AM Krissy Weber PA-C          ED Provider  Electronically Signed by           Dennise Buitrago PA-C  03/30/23 2049

## 2023-03-31 NOTE — DISCHARGE INSTRUCTIONS
We applied a foam called Surgicel to your wound  This helps create an artificial scab to stop the bleeding  We then wrapped it with yellow Vaseline gauze called Xeroform  This prevents anything sticking to your wound  We then put regular gauze and wrapped it and Coban to provide protection and soak up any drainage  Once a day take off the dressing and the Xeroform, but leave that foam on the wound  You can cut a new piece of Xeroform to wrap around the tip of your thumb, apply new gauze, and then wrap it and Coban lightly  Do not wrap it too tightly so that you do not cut off circulation  Keep your thumb clean and dry and avoid using it hand for grasping activities  Elevate your hand to help with the throbbing and swelling  Follow-up with your family doctor early next week to monitor for wound healing  We gave you supplies for your dressing changes  Take this to the doctor's office  Return to the ED if you have any signs of infection such as increased redness and swelling of the right hand, drainage that looks like pus (could be white or yellow, thick or thin), fevers and chills, the wound starts bleeding again and you cannot get it to stop, or other concerning symptoms

## 2023-03-31 NOTE — ED ATTENDING ATTESTATION
3/30/2023  Zoltan BLACKWELL MD, saw and evaluated the patient  I have discussed the patient with the resident/non-physician practitioner and agree with the resident's/non-physician practitioner's findings, Plan of Care, and MDM as documented in the resident's/non-physician practitioner's note, except where noted  All available labs and Radiology studies were reviewed  I was present for key portions of any procedure(s) performed by the resident/non-physician practitioner and I was immediately available to provide assistance  At this point I agree with the current assessment done in the Emergency Department  I have conducted an independent evaluation of this patient a history and physical is as follows:    52-year-old female otherwise healthy except for history of asthma and remote history of ovarian cancer sustained an avulsion injury to her right thumb after using mandolin slicer just prior to arrival direct pressure was applied but it continues to ooze whenever the pressure is let up  She denies any numbness tingling or weakness last tetanus was approximately 1 year ago  Physical examination vital signs noted patient has intact range of motion of the thumb there is no evidence of subungual hematoma light touch is intact  Medical decision making: Area is not amenable to suturing will instead achieve homeostasis with years of use of Surgifoam and apply Xeroform dressing the area was cleansed with chlorhexidine preop prior irrigated and then Surgifoam was applied which did help with hemostasis we reviewed wound management that she is to change only the Xeroform portion of the dressing and leave the Surgifoam in place to allow healing to occur reviewed signs and symptoms of wound infection  Will return with any new or worsening symptoms patient was comfortable with plan        ED Course         Critical Care Time  Procedures    Impression is avulsion injury to the right thumb finger pad 0 5 cm  Plan as discussed above

## 2023-04-05 ENCOUNTER — OFFICE VISIT (OUTPATIENT)
Dept: FAMILY MEDICINE CLINIC | Facility: CLINIC | Age: 53
End: 2023-04-05

## 2023-04-05 VITALS
WEIGHT: 145.8 LBS | SYSTOLIC BLOOD PRESSURE: 140 MMHG | HEART RATE: 78 BPM | DIASTOLIC BLOOD PRESSURE: 80 MMHG | OXYGEN SATURATION: 98 % | TEMPERATURE: 98.2 F | BODY MASS INDEX: 27.53 KG/M2 | HEIGHT: 61 IN

## 2023-04-05 DIAGNOSIS — S61.001D OPEN WOUND OF RIGHT THUMB, SUBSEQUENT ENCOUNTER: Primary | ICD-10-CM

## 2023-04-05 PROBLEM — S61.001A OPEN WOUND OF RIGHT THUMB: Status: ACTIVE | Noted: 2023-04-05

## 2023-04-05 NOTE — PROGRESS NOTES
Name: Mario Cho      : 1970      MRN: [id number]  Encounter Provider: Jania Scott DO  Encounter Date: 2023   [facility]    Assessment & Plan     1  Open wound of right thumb, subsequent encounter  Assessment & Plan:  Suffered an avulsion laceration to her right thumb  I think the wound looks great  I told her I think that the treating physician did a terrific job  I see no sign of infection  This is healing well  I did advise the patient that once healed, she is either going to have numbness at the tip of her finger for 6 months or so, where she will have the opposite  She may have hyperesthesia  I redressed the patient's wound today with Xeroform gauze, 2 x 2 gauze, Vineet wrap, and a stockinette  I told the patient that come Monday, I think she would be fine just using some fingertip Band-Aids  I think she could even get away with that now but she wants some extra padding right now as she is still quite fearful  She can get it wet also come Monday  She is currently using a plastic baggy over hands  Subjective      This is a 61-year-old white female who presents to the office today as a follow-up from the urgency department  Patient states that 6 days ago, she was using a mandolin to cut potatoes when she sliced off the tip of her right thumb  She is right-hand dominant  She tells me that she had an avulsion laceration and was unable to get sutures  She has her hand wrapped  She tells me she prefers to keep a heavy wrapping over this for protection in case she bumps it, which she has been done  She tells me the ER wanted her to be reevaluated to make sure there was no infection  She denies any fever or chills  Review of Systems   Constitutional: Negative for chills, diaphoresis and fever  Skin: Positive for wound         Current Outpatient Medications on File Prior to Visit   Medication Sig   • albuterol (Ventolin HFA) 90 "mcg/act inhaler Inhale 2 puffs every 4 (four) hours as needed for wheezing or shortness of breath   • ascorbic acid (VITAMIN C) 250 mg tablet Take 250 mg by mouth daily   • Cod Liver Oil (COD LIVER PO) Take 1 Dose by mouth 3 (three) times a week    • MAGNESIUM BISGLYCINATE PO    • multivitamin (THERAGRAN) TABS Take 1 tablet by mouth daily   • Probiotic Product (PROBIOTIC PO) Take 1 capsule by mouth daily    • TURMERIC PO Take by mouth 2 (two) times a day   • OLIVE LEAF PO Take 300 mg by mouth daily (Patient not taking: Reported on 2/15/2023)   • [DISCONTINUED] ondansetron (ZOFRAN) 8 mg tablet Take 1 tablet (8 mg total) by mouth every 8 (eight) hours as needed for nausea or vomiting       Objective     /80   Pulse 78   Temp 98 2 °F (36 8 °C) (Tympanic)   Ht 5' 1\" (1 549 m)   Wt 66 1 kg (145 lb 12 8 oz)   LMP 01/02/2019 (LMP Unknown)   SpO2 98%   BMI 27 55 kg/m²     Physical Exam  Vitals reviewed  Constitutional:       Comments: This is a pleasant 77-year-old white female who appears her stated age  She is in no apparent distress   Lymphadenopathy:      Upper Body:      Right upper body: No axillary or epitrochlear adenopathy  Skin:     Comments: I was able to remove the patient's dressing from her right hand  There is an avulsion laceration present to the tip of the right thumb  There is a thin layer of Gelfoam covering the wound  There is no erythema, pus or drainage  There is no swelling of the thumb  She had difficulty bending the thumb but this was more just from stiffness  After she tried for a moment she was able to bend her thumb normally  There were no toxic striations  Thumb was warm to the touch  Due to the location of the laceration, I did not assess for capillary refill  This would have caused pain         Marrivanessa Failing, DO  "

## 2023-04-06 NOTE — ASSESSMENT & PLAN NOTE
Suffered an avulsion laceration to her right thumb  I think the wound looks great  I told her I think that the treating physician did a terrific job  I see no sign of infection  This is healing well  I did advise the patient that once healed, she is either going to have numbness at the tip of her finger for 6 months or so, where she will have the opposite  She may have hyperesthesia  I redressed the patient's wound today with Xeroform gauze, 2 x 2 gauze, Vineet wrap, and a stockinette  I told the patient that come Monday, I think she would be fine just using some fingertip Band-Aids  I think she could even get away with that now but she wants some extra padding right now as she is still quite fearful  She can get it wet also come Monday  She is currently using a plastic baggy over hands

## 2023-06-22 ENCOUNTER — TELEPHONE (OUTPATIENT)
Dept: SURGERY | Facility: CLINIC | Age: 53
End: 2023-06-22

## 2023-06-22 NOTE — TELEPHONE ENCOUNTER
Called pt and left voicemail asking how she was doing and if she wanted to reschedule her appt if she has any issues

## 2023-06-22 NOTE — TELEPHONE ENCOUNTER
----- Message from Thor Min sent at 5/26/2022  2:46 PM EDT -----  Regarding: f/u on hernia  Patient canceled her appointment for 06/22/22 for a 1 year f/u on s/p lap inc  Hernia rpr 01/21/2020  Call patient to see how she is doing

## 2023-06-26 NOTE — TELEPHONE ENCOUNTER
Patient returned my phone call and states that everything is healed up nicely,and no longer needs our services

## 2023-07-10 ENCOUNTER — TELEPHONE (OUTPATIENT)
Dept: HEMATOLOGY ONCOLOGY | Facility: CLINIC | Age: 53
End: 2023-07-10

## 2023-07-10 NOTE — TELEPHONE ENCOUNTER
Appointment Change  Cancel, Reschedule, Change to Virtual      Who are you speaking with? Patient   If it is not the patient, are they listed on an active communication consent form? N/A   Which provider is the appointment scheduled with? Dr. Javon Fernandez   When is the appointment scheduled? Please list date and time 7/12/23 2:00PM   At which location is the appointment scheduled to take place? [place] ([facility])   Was the appointment rescheduled or changed from an in person visit to a virtual visit? If so, please list the details of the change. 8/16/23 11:45AM   What is the reason for the appointment change? Patient had conflict come up. Was [facility]AR transport scheduled for this visit? No   Does STAR transport need to be scheduled for the new visit (if applicable) No   Does the patient need an infusion appointment rescheduled? No   Does the patient have an infusion appointment scheduled? If so, when? No   Is the patient undergoing chemotherapy? No   Was the no-show policy reviewed for appointments being changed with less then 24 hours of notice?  Yes

## 2023-08-08 ENCOUNTER — RA CDI HCC (OUTPATIENT)
Dept: OTHER | Facility: HOSPITAL | Age: 53
End: 2023-08-08

## 2023-08-08 NOTE — PROGRESS NOTES
[address] [facility] coding opportunities          Chart Reviewed number of suggestions sent to Provider: 1     Patients Insurance        Commercial Insurance: [employer]in[employer]       [id number]:  Mild intermittent asthma without complication [888511]     Found in active problem list - please review and assess and document per MEAT if applicable for [id number]

## 2023-08-11 LAB — HBA1C MFR BLD HPLC: 6.2 %

## 2023-08-15 ENCOUNTER — OFFICE VISIT (OUTPATIENT)
Dept: FAMILY MEDICINE CLINIC | Facility: CLINIC | Age: 53
End: 2023-08-15
Payer: COMMERCIAL

## 2023-08-15 VITALS
HEIGHT: 61 IN | DIASTOLIC BLOOD PRESSURE: 71 MMHG | SYSTOLIC BLOOD PRESSURE: 126 MMHG | TEMPERATURE: 97.7 F | BODY MASS INDEX: 27.94 KG/M2 | HEART RATE: 71 BPM | WEIGHT: 148 LBS | OXYGEN SATURATION: 96 %

## 2023-08-15 DIAGNOSIS — Z85.43 HISTORY OF OVARIAN CANCER: ICD-10-CM

## 2023-08-15 DIAGNOSIS — Z79.899 ENCOUNTER FOR LONG-TERM (CURRENT) USE OF MEDICATIONS: ICD-10-CM

## 2023-08-15 DIAGNOSIS — R73.01 IMPAIRED FASTING GLUCOSE: Primary | ICD-10-CM

## 2023-08-15 DIAGNOSIS — B35.1 ONYCHOMYCOSIS: ICD-10-CM

## 2023-08-15 DIAGNOSIS — E55.9 VITAMIN D DEFICIENCY: ICD-10-CM

## 2023-08-15 DIAGNOSIS — J45.20 MILD INTERMITTENT ASTHMA, UNCOMPLICATED: ICD-10-CM

## 2023-08-15 DIAGNOSIS — E78.5 DYSLIPIDEMIA: ICD-10-CM

## 2023-08-15 PROCEDURE — 99214 OFFICE O/P EST MOD 30 MIN: CPT | Performed by: FAMILY MEDICINE

## 2023-08-15 NOTE — ASSESSMENT & PLAN NOTE
His asthma has been well controlled. She uses her albuterol inhaler as needed. Last time she used it was 1 week ago.

## 2023-08-15 NOTE — ASSESSMENT & PLAN NOTE
Patient reports recurrence of her onychomycosis. She would like treatment. In the past, she did take Lamisil but this had to be stopped due to an elevation of her liver enzymes. She does not want to try Lamisil again. We discussed alternative treatments including over-the-counter. I am going to have the patient start Jublia. She can apply this to her toenails at bedtime daily for 48 weeks.

## 2023-08-15 NOTE — PROGRESS NOTES
Name: Nilton Molina      : 1970      MRN: [id number]  Encounter Provider: Patricia Thorne DO  Encounter Date: 8/15/2023   Encounter department: [facility] PRIMARY CARE    Assessment & Plan     1. Impaired fasting glucose  Assessment & Plan:  Patient has impaired fasting glucose. I reviewed her labs. Her fasting blood sugar was 109. Hemoglobin A1c is 6.2%. I told the patient again that this is consistent with prediabetes. The patient to continue to exercise, try to work on her diet and lose weight. She does have a very strong family history of diabetes, which I suspect is the main culprit here. Patient herself is at high risk of developing overt diabetes in the future. As such, I plan to continue to follow her blood sugar and hemoglobin A1c. I ordered repeat fasting labs for her next office visit. Orders:  -     Hemoglobin A1C; Future; Expected date: 11/15/2023    2. Onychomycosis  Assessment & Plan:  Patient reports recurrence of her onychomycosis. She would like treatment. In the past, she did take Lamisil but this had to be stopped due to an elevation of her liver enzymes. She does not want to try Lamisil again. We discussed alternative treatments including over-the-counter. I am going to have the patient start Jublia. She can apply this to her toenails at bedtime daily for 48 weeks. Orders:  -     Efinaconazole 10 % SOLN; Apply to affected toenails at bedtime    3. Dyslipidemia  -     Lipid Panel with Direct LDL reflex; Future  -     Comprehensive metabolic panel; Future    4. Encounter for long-term (current) use of medications  -     CBC and differential; Future    5. Vitamin D deficiency  -     Vitamin D 25 hydroxy; Future    6. Mild intermittent asthma, uncomplicated  Assessment & Plan:  His asthma has been well controlled. She uses her albuterol inhaler as needed. Last time she used it was 1 week ago.       7. History of ovarian cancer  Assessment & Plan:  Patient Ca1 22 level was 8. She is now 4 years status postdiagnosis. She will continue follow-up with Dr. sIael Frost. Subjective      Patient is a 57-year-old white female who presents to the office today for her routine checkup. The patient is doing well and has no complaints. She tells me she is trying to watch her diet. She is exercising regularly. She tells me she found that the low-fat diet did not work in lowering her blood sugars. She has a follow-up appointment with Dr. Isael Frost tomorrow. She has not been experiencing any abdominal pain or bloating. Patient feels well in general.    Review of Systems   Respiratory: Negative for cough and shortness of breath. Cardiovascular: Negative for chest pain, palpitations and leg swelling. Gastrointestinal: Negative for abdominal distention, abdominal pain, blood in stool, constipation, diarrhea and nausea. Genitourinary: Negative for dysuria, frequency and urgency. Skin:        Reports thickened discolored toenails.        Current Outpatient Medications on File Prior to Visit   Medication Sig   • albuterol (Ventolin HFA) 90 mcg/act inhaler Inhale 2 puffs every 4 (four) hours as needed for wheezing or shortness of breath   • ascorbic acid (VITAMIN C) 250 mg tablet Take 250 mg by mouth daily   • Cod Liver Oil (COD LIVER PO) Take 1 Dose by mouth 3 (three) times a week    • MAGNESIUM BISGLYCINATE PO    • multivitamin (THERAGRAN) TABS Take 1 tablet by mouth daily   • Probiotic Product (PROBIOTIC PO) Take 1 capsule by mouth daily    • TURMERIC PO Take by mouth 2 (two) times a day   • OLIVE LEAF PO Take 300 mg by mouth daily (Patient not taking: Reported on 2/15/2023)   • [DISCONTINUED] ondansetron (ZOFRAN) 8 mg tablet Take 1 tablet (8 mg total) by mouth every 8 (eight) hours as needed for nausea or vomiting       Objective     /71   Pulse 71   Temp 97.7 °F (36.5 °C) (Tympanic)   Ht 5' 1" (1.549 m)   Wt 67.1 kg (148 lb)   LMP 05/10/2015   SpO2 96%   BMI 27.96 kg/m²     Physical Exam  Vitals reviewed. Constitutional:       Comments: The patient is a 20-year-old white female who appears her stated age. The patient is pleasant, cooperative, and in no distress   HENT:      Head: Normocephalic and atraumatic. Right Ear: Tympanic membrane, ear canal and external ear normal. There is no impacted cerumen. Left Ear: Tympanic membrane, ear canal and external ear normal. There is no impacted cerumen. Mouth/Throat:      Mouth: Mucous membranes are moist.      Pharynx: Oropharynx is clear. No oropharyngeal exudate or posterior oropharyngeal erythema. Eyes:      General: No scleral icterus. Right eye: No discharge. Left eye: No discharge. Conjunctiva/sclera: Conjunctivae normal.      Pupils: Pupils are equal, round, and reactive to light. Neck:      Vascular: No carotid bruit. Comments: There is no thyromegaly  Cardiovascular:      Rate and Rhythm: Normal rate and regular rhythm. Heart sounds: Normal heart sounds. No murmur heard. No friction rub. No gallop. Pulmonary:      Effort: Pulmonary effort is normal. No respiratory distress. Breath sounds: Normal breath sounds. No stridor. No wheezing, rhonchi or rales. Abdominal:      General: Bowel sounds are normal. There is no distension. Palpations: Abdomen is soft. There is no mass. Tenderness: There is no abdominal tenderness. There is no guarding. Musculoskeletal:      Cervical back: Neck supple. Lymphadenopathy:      Cervical: No cervical adenopathy. Skin:     Comments: Positive for onychomycosis   Psychiatric:         Mood and Affect: Mood normal.         Behavior: Behavior normal.         Thought Content:  Thought content normal.         Judgment: Judgment normal.     Extremities: Without cyanosis, clubbing, or edema    Lazarus Uriarte DO

## 2023-08-15 NOTE — ASSESSMENT & PLAN NOTE
Patient has impaired fasting glucose. I reviewed her labs. Her fasting blood sugar was 109. Hemoglobin A1c is 6.2%. I told the patient again that this is consistent with prediabetes. The patient to continue to exercise, try to work on her diet and lose weight. She does have a very strong family history of diabetes, which I suspect is the main culprit here. Patient herself is at high risk of developing overt diabetes in the future. As such, I plan to continue to follow her blood sugar and hemoglobin A1c. I ordered repeat fasting labs for her next office visit.

## 2023-08-15 NOTE — ASSESSMENT & PLAN NOTE
Patient Ca1 25 level was 8. She is now 4 years status postdiagnosis. She will continue follow-up with Dr. Akila Nicole

## 2023-08-16 ENCOUNTER — OFFICE VISIT (OUTPATIENT)
Dept: GYNECOLOGIC ONCOLOGY | Facility: CLINIC | Age: 53
End: 2023-08-16
Payer: COMMERCIAL

## 2023-08-16 VITALS
HEART RATE: 72 BPM | OXYGEN SATURATION: 98 % | SYSTOLIC BLOOD PRESSURE: 128 MMHG | BODY MASS INDEX: 28.09 KG/M2 | WEIGHT: 148.8 LBS | DIASTOLIC BLOOD PRESSURE: 70 MMHG | HEIGHT: 61 IN | RESPIRATION RATE: 16 BRPM

## 2023-08-16 DIAGNOSIS — Z85.43 HISTORY OF OVARIAN CANCER: Primary | ICD-10-CM

## 2023-08-16 PROCEDURE — 99213 OFFICE O/P EST LOW 20 MIN: CPT | Performed by: OBSTETRICS & GYNECOLOGY

## 2023-08-16 RX ORDER — EFINACONAZOLE 100 MG/ML
SOLUTION TOPICAL
Refills: 5 | OUTPATIENT
Start: 2023-08-16

## 2023-08-16 NOTE — PROGRESS NOTES
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 She has been disease-free since that time and remains in a clinical remission. Mammograms are up-to-date. We will see the patient back in 6 months with  for repeat evaluation. Relevant Orders             CHIEF COMPLAINT: Surveillance ovarian cancer      Problem:  Cancer Staging   History of ovarian cancer  Staging form: Ovary, Fallopian Tube, Primary Peritoneal, AJCC 8th Edition  - Clinical stage from 5/28/2019: FIGO Stage I (cT1, cN0, cM0) - Signed by Kaleb Null MD on 5/28/2019  - Pathologic stage from 5/28/2019: FIGO Stage IC1, calculated as Stage IC (pT1c1, pN0, cM0) - Signed by aKleb Null MD on 5/28/2019        Previous therapy:  Oncology History   History of ovarian cancer   5/17/2019 Initial Diagnosis    Malignant neoplasm of left ovary ([address])     5/17/2019 Surgery    Patient underwent total abdominal hysterectomy bilateral salpingo oophorectomy radical omentectomy bilateral pelvic lymphadenectomy and staging procedure for 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.      5/28/2019 -  Cancer Staged    Staging form: Ovary, Fallopian Tube, Primary Peritoneal, AJCC 8th Edition  - Pathologic stage from 5/28/2019: FIGO Stage IC1, calculated as Stage IC (pT1c1, pN0, cM0) - Signed by Kaleb Null MD on 5/28/2019  Histologic grade (G): G3  Histologic grading system: 4 grade system  Residual tumor volume after primary cytoreductive surgery: No gross tumor  Stage used in treatment planning: Yes  National guidelines used in treatment planning: Yes       5/28/2019 -  Cancer Staged    Staging form: Ovary, Fallopian Tube, Primary Peritoneal, AJCC 8th Edition  - Clinical stage from 5/28/2019: FIGO Stage I (cT1, cN0, cM0) - Signed by Pravin Washington MD on 5/28/2019  Stage used in treatment planning: Yes  National guidelines used in treatment planning: Yes        Chemotherapy    Carbo platinum area under the curve of 6 paclitaxel 175 milligrams/meter squared for 3-6 cycles. Patient refused treatment           Patient ID: Hortensia Wong is a 52 y.o. 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 She then underwent chemotherapy with carboplatin and paclitaxel. She has been disease-free since that time. The patient presents for follow-up today she is without complaint. Her  is stable at 8. Today, the patient is doing well. She denies significant abdominal pain, pelvic pain, nausea, vomiting, constipation, diarrhea, fevers, chills, or vaginal bleeding. The following portions of the patient's history were reviewed and updated as appropriate: allergies, current medications, past family history, past social history, past surgical history and problem list.    Review of Systems   Constitutional: Negative. HENT: Negative. Eyes: Negative. Respiratory: Negative. Cardiovascular: Negative. Gastrointestinal: Negative. Endocrine: Negative. Genitourinary: Negative. Musculoskeletal: Negative. Skin: Negative. Neurological: Negative. Hematological: Negative. Psychiatric/Behavioral: Negative.         Current Outpatient Medications   Medication Sig Dispense Refill   • albuterol (Ventolin HFA) 90 mcg/act inhaler Inhale 2 puffs every 4 (four) hours as needed for wheezing or shortness of breath 18 g 1   • ascorbic acid (VITAMIN C) 250 mg tablet Take 250 mg by mouth daily     • Cod Liver Oil (COD LIVER PO) Take 1 Dose by mouth 3 (three) times a week      • Efinaconazole 10 % SOLN Apply to affected toenails at bedtime 8 mL 5   • MAGNESIUM BISGLYCINATE PO      • multivitamin (THERAGRAN) TABS Take 1 tablet by mouth daily     • Probiotic Product (PROBIOTIC PO) Take 1 capsule by mouth daily      • TURMERIC PO Take by mouth 2 (two) times a day     • OLIVE LEAF PO Take 300 mg by mouth daily (Patient not taking: Reported on 2/15/2023)       No current facility-administered medications for this visit. Objective:    Blood pressure 128/70, pulse 72, resp. rate 16, height 5' 1" (1.549 m), weight 67.5 kg (148 lb 12.8 oz), last menstrual period 05/10/2015, SpO2 98 %. Body mass index is 28.12 kg/m². Body surface area is 1.67 meters squared. Physical Exam  Constitutional:       Appearance: She is well-developed. HENT:      Head: Normocephalic and atraumatic. Neck:      Thyroid: No thyromegaly. Cardiovascular:      Rate and Rhythm: Normal rate and regular rhythm. Heart sounds: Normal heart sounds. Pulmonary:      Effort: Pulmonary effort is normal.      Breath sounds: Normal breath sounds. Abdominal:      General: Bowel sounds are normal.      Palpations: Abdomen is soft. Comments: Well healed incisions. Genitourinary:     Comments: -Normal external female genitalia, normal Bartholin's and [place]'s glands                  -Normal midline urethral meatus. No lesions notes                  -Bladder without fullness mass or tenderness                  -Vagina without lesion or discharge No significant cystocele or rectocele noted                  -Cervix surgically absent                  -Uterus surgically absent                  -Adnexae surgically absent                  - Anus without fissure of lesion    Musculoskeletal:         General: Normal range of motion. Cervical back: Normal range of motion and neck supple. Lymphadenopathy:      Cervical: No cervical adenopathy. Skin:     General: Skin is warm and dry. Neurological:      Mental Status: She is alert and oriented to person, place, and time. Psychiatric:         Behavior: Behavior normal.         Lab Results   Component Value Date     7.3 02/22/2021     Lab Results   Component Value Date     10/23/2014    K 3.6 02/22/2021     02/22/2021    CO2 29 02/22/2021    ANIONGAP 9 10/23/2014    BUN 12 02/22/2021    CREATININE 0.80 02/22/2021    GLUCOSE 132 10/23/2014    GLUF 109 (H) 02/22/2021    CALCIUM 9.2 02/22/2021    AST 23 02/22/2021    ALT 42 02/22/2021    ALKPHOS 59 02/22/2021    PROT 8.3 (H) 10/23/2014    BILITOT 0.2 10/23/2014    EGFR 85 02/22/2021     Lab Results   Component Value Date    WBC 7.61 12/24/2019    HGB 13.4 12/24/2019    HCT 39.0 12/24/2019    MCV 92 12/24/2019     12/24/2019     Lab Results   Component Value Date    NEUTROABS 3.99 12/24/2019        Trend:  Lab Results   Component Value Date     7.3 02/22/2021     6.3 11/30/2020     5.3 08/24/2020     5.0 05/21/2020     4.3 12/27/2019     4.9 09/16/2019     45.4 (H) 05/10/2019

## 2023-08-16 NOTE — LETTER
August 16, 2023     Estrella Bowen, [address]  [address]  [facility] [address]    Patient: Olive Terrazas   YOB: 1970   Date of Visit: 8/16/2023       Dear Dr. Carlo Mccoy:    Thank you for referring Angie Birmingham to me for evaluation. Below are my notes for this consultation. If you have questions, please do not hesitate to call me. I look forward to following your patient along with you. 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 She has been disease-free since that time and remains in a clinical remission. Mammograms are up-to-date. We will see the patient back in 6 months with  for repeat evaluation. Relevant Orders             CHIEF COMPLAINT: Surveillance ovarian cancer      Problem:  Cancer Staging   History of ovarian cancer  Staging form: Ovary, Fallopian Tube, Primary Peritoneal, AJCC 8th Edition  - Clinical stage from 5/28/2019: FIGO Stage I (cT1, cN0, cM0) - Signed by Francis Mcdonald MD on 5/28/2019  - Pathologic stage from 5/28/2019: FIGO Stage IC1, calculated as Stage IC (pT1c1, pN0, cM0) - Signed by Francis Mcdonald MD on 5/28/2019        Previous therapy:  Oncology History   History of ovarian cancer   5/17/2019 Initial Diagnosis    Malignant neoplasm of left ovary ([address])     5/17/2019 Surgery    Patient underwent total abdominal hysterectomy bilateral salpingo oophorectomy radical omentectomy bilateral pelvic lymphadenectomy and staging procedure for 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.      5/28/2019 -  Cancer Staged    Staging form: Ovary, Fallopian Tube, Primary Peritoneal, AJCC 8th Edition  - Pathologic stage from 5/28/2019: FIGO Stage IC1, calculated as Stage IC (pT1c1, pN0, cM0) - Signed by Chantel Dawn MD on 5/28/2019  Histologic grade (G): G3  Histologic grading system: 4 grade system  Residual tumor volume after primary cytoreductive surgery: No gross tumor  Stage used in treatment planning: Yes  National guidelines used in treatment planning: Yes       5/28/2019 -  Cancer Staged    Staging form: Ovary, Fallopian Tube, Primary Peritoneal, AJCC 8th Edition  - Clinical stage from 5/28/2019: FIGO Stage I (cT1, cN0, cM0) - Signed by Chantel Dawn MD on 5/28/2019  Stage used in treatment planning: Yes  National guidelines used in treatment planning: Yes        Chemotherapy    Carbo platinum area under the curve of 6 paclitaxel 175 milligrams/meter squared for 3-6 cycles. Patient refused treatment           Patient ID: Tramaine Kendall is a 52 y.o. 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 She then underwent chemotherapy with carboplatin and paclitaxel. She has been disease-free since that time. The patient presents for follow-up today she is without complaint. Her  is stable at 8. Today, the patient is doing well. She denies significant abdominal pain, pelvic pain, nausea, vomiting, constipation, diarrhea, fevers, chills, or vaginal bleeding. The following portions of the patient's history were reviewed and updated as appropriate: allergies, current medications, past family history, past social history, past surgical history and problem list.    Review of Systems   Constitutional: Negative. HENT: Negative. Eyes: Negative. Respiratory: Negative. Cardiovascular: Negative. Gastrointestinal: Negative. Endocrine: Negative. Genitourinary: Negative. Musculoskeletal: Negative. Skin: Negative. Neurological: Negative. Hematological: Negative. Psychiatric/Behavioral: Negative. Current Outpatient Medications   Medication Sig Dispense Refill   • albuterol (Ventolin HFA) 90 mcg/act inhaler Inhale 2 puffs every 4 (four) hours as needed for wheezing or shortness of breath 18 g 1   • ascorbic acid (VITAMIN C) 250 mg tablet Take 250 mg by mouth daily     • Cod Liver Oil (COD LIVER PO) Take 1 Dose by mouth 3 (three) times a week      • Efinaconazole 10 % SOLN Apply to affected toenails at bedtime 8 mL 5   • MAGNESIUM BISGLYCINATE PO      • multivitamin (THERAGRAN) TABS Take 1 tablet by mouth daily     • Probiotic Product (PROBIOTIC PO) Take 1 capsule by mouth daily      • TURMERIC PO Take by mouth 2 (two) times a day     • OLIVE LEAF PO Take 300 mg by mouth daily (Patient not taking: Reported on 2/15/2023)       No current facility-administered medications for this visit. Objective:    Blood pressure 128/70, pulse 72, resp. rate 16, height 5' 1" (1.549 m), weight 67.5 kg (148 lb 12.8 oz), last menstrual period 05/10/2015, SpO2 98 %. Body mass index is 28.12 kg/m². Body surface area is 1.67 meters squared. Physical Exam  Constitutional:       Appearance: She is well-developed. HENT:      Head: Normocephalic and atraumatic. Neck:      Thyroid: No thyromegaly. Cardiovascular:      Rate and Rhythm: Normal rate and regular rhythm. Heart sounds: Normal heart sounds. Pulmonary:      Effort: Pulmonary effort is normal.      Breath sounds: Normal breath sounds. Abdominal:      General: Bowel sounds are normal.      Palpations: Abdomen is soft. Comments: Well healed incisions. Genitourinary:     Comments: -Normal external female genitalia, normal [facility]'s glands                  -Normal midline urethral meatus.  No lesions notes                  -Bladder without fullness mass or tenderness                  -Vagina without lesion or discharge No significant cystocele or rectocele noted                  -Cervix surgically absent                  -Uterus surgically absent                  -Adnexae surgically absent                  - Anus without fissure of lesion    Musculoskeletal:         General: Normal range of motion. Cervical back: Normal range of motion and neck supple. Lymphadenopathy:      Cervical: No cervical adenopathy. Skin:     General: Skin is warm and dry. Neurological:      Mental Status: She is alert and oriented to person, place, and time.    Psychiatric:         Behavior: Behavior normal.         Lab Results   Component Value Date     7.3 02/22/2021     Lab Results   Component Value Date     10/23/2014    K 3.6 02/22/2021     02/22/2021    CO2 29 02/22/2021    ANIONGAP 9 10/23/2014    BUN 12 02/22/2021    CREATININE 0.80 02/22/2021    GLUCOSE 132 10/23/2014    GLUF 109 (H) 02/22/2021    CALCIUM 9.2 02/22/2021    AST 23 02/22/2021    ALT 42 02/22/2021    ALKPHOS 59 02/22/2021    PROT 8.3 (H) 10/23/2014    BILITOT 0.2 10/23/2014    EGFR 85 02/22/2021     Lab Results   Component Value Date    WBC 7.61 12/24/2019    HGB 13.4 12/24/2019    HCT 39.0 12/24/2019    MCV 92 12/24/2019     12/24/2019     Lab Results   Component Value Date    NEUTROABS 3.99 12/24/2019        Trend:  Lab Results   Component Value Date     7.3 02/22/2021     6.3 11/30/2020     5.3 08/24/2020     5.0 05/21/2020     4.3 12/27/2019     4.9 09/16/2019     45.4 (H) 05/10/2019

## 2023-08-16 NOTE — ASSESSMENT & PLAN NOTE
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 She has been disease-free since that time and remains in a clinical remission. Mammograms are up-to-date. We will see the patient back in 6 months with  for repeat evaluation.

## 2024-01-26 ENCOUNTER — RA CDI HCC (OUTPATIENT)
Dept: OTHER | Facility: HOSPITAL | Age: 54
End: 2024-01-26

## 2024-01-26 NOTE — PROGRESS NOTES
HCC coding opportunities          Chart Reviewed number of suggestions sent to Provider: 1     Patients Insurance        Commercial Insurance: [employer]       J45.20: Mild intermittent asthma without complication [[id number]]     Last assessed on 8/15/2023 - please review and assess and document per MEAT if applicable for 2024

## 2024-02-14 ENCOUNTER — TELEPHONE (OUTPATIENT)
Dept: HEMATOLOGY ONCOLOGY | Facility: CLINIC | Age: 54
End: 2024-02-14

## 2024-02-15 ENCOUNTER — TELEPHONE (OUTPATIENT)
Age: 54
End: 2024-02-15

## 2024-02-15 NOTE — TELEPHONE ENCOUNTER
I left a message informing the patient that I spoke with Tom at [facility]. He checked the Benefits for specialist out of network and informed me that there is no coverage for out of network providers. He also informed me that the insurance company will not be able to authorize an out of network referral/auth to cover the patient's visit with Dr. Chirinos because he is out of network. He informed me that the patient will need to pay out of pocket or she change to an in-network provider for insurance coverage.    I informed the patient of all this information and provided the office number to call with any questions or concerns.    The call reference number is [id number].

## 2024-02-20 ENCOUNTER — TELEPHONE (OUTPATIENT)
Dept: FAMILY MEDICINE CLINIC | Facility: CLINIC | Age: 54
End: 2024-02-20

## 2024-02-21 PROBLEM — Z12.39 SCREENING FOR BREAST CANCER: Status: RESOLVED | Noted: 2019-10-29 | Resolved: 2024-02-21

## 2024-04-10 ENCOUNTER — OFFICE VISIT (OUTPATIENT)
Dept: FAMILY MEDICINE CLINIC | Facility: CLINIC | Age: 54
End: 2024-04-10

## 2024-04-10 VITALS
TEMPERATURE: 97.5 F | HEIGHT: 61 IN | BODY MASS INDEX: 28.58 KG/M2 | DIASTOLIC BLOOD PRESSURE: 79 MMHG | SYSTOLIC BLOOD PRESSURE: 175 MMHG | WEIGHT: 151.4 LBS | OXYGEN SATURATION: 98 % | HEART RATE: 66 BPM

## 2024-04-10 DIAGNOSIS — R73.01 IMPAIRED FASTING GLUCOSE: ICD-10-CM

## 2024-04-10 DIAGNOSIS — R03.0 ELEVATED BLOOD PRESSURE READING IN OFFICE WITHOUT DIAGNOSIS OF HYPERTENSION: ICD-10-CM

## 2024-04-10 DIAGNOSIS — D68.69 HYPERCOAGULABLE STATE, SECONDARY (HCC): ICD-10-CM

## 2024-04-10 DIAGNOSIS — D48.5 NEOPLASM OF UNCERTAIN BEHAVIOR OF SKIN: Primary | ICD-10-CM

## 2024-04-10 DIAGNOSIS — C56.9 MALIGNANT NEOPLASM OF OVARY, UNSPECIFIED LATERALITY (HCC): ICD-10-CM

## 2024-04-10 PROCEDURE — 99213 OFFICE O/P EST LOW 20 MIN: CPT | Performed by: FAMILY MEDICINE

## 2024-04-10 NOTE — PROGRESS NOTES
Name: Ida Toussaint      : 1970      MRN: [id number]  Encounter Provider: Kanu Oliveros DO  Encounter Date: 4/10/2024   Encounter department: [facility] PRIMARY CARE    Assessment & Plan     1. Neoplasm of uncertain behavior of skin  Assessment & Plan:  We discussed a biopsy of the lesion.  Patient was agreeable.  I prepped the skin in the usual fashion with alcohol.  I then used 1% lidocaine with epinephrine to raise a small wheal under the lesion as a local anesthetic.  After sufficient anesthesia was achieved, #15 scalpel was used to perform a shave biopsy.  The lesion was sent for pathology.  The base of the lesion was then fulgurated.  I lightly curetted the base with a 3 mm dermal curette to remove any remnants.  I then refulgurated the area with excellent hemostasis achieved.  Triple antibiotic ointment was applied followed by Band-Aid.  Wound care instructions were given to the patient.  After the patient had checked out, I learned that her current insurance is not excepted by [employer].  As such, the patient declined sending the specimen out for biopsy.  I reassured her as I suspect it is a benign nevus that became necrotic from rubbing on her bra strap although I think other causes needed to be ruled out.  However, I am fairly confident that it is benign and I reassured the patient.      2. Malignant neoplasm of ovary, unspecified laterality (HCC)  -     Ambulatory Referral to Nutrition Services; Future    3. Hypercoagulable state, secondary (HCC)    4. Impaired fasting glucose  -     Ambulatory Referral to Nutrition Services; Future    5. Elevated blood pressure reading in office without diagnosis of hypertension  Assessment & Plan:  Blood pressure today in the office was rechecked by me and found to be elevated at 140/90.  Blood pressure has been normal in the past.  I am simply going to recommend she follow a low-sodium diet and we will observe this for now.      Shave  lesion    Date/Time: 4/10/2024 11:30 AM    Performed by: Kanu Oliveros DO  Authorized by: Kanu Oliveros DO  Universal Protocol:  Consent: Verbal consent obtained. Written consent not obtained.  Consent given by: patient    Number of Lesions: 1  Lesion 1:     Body area: trunk    Trunk location: L breast    Initial size (mm): 5    Final defect size (mm): 5    Malignancy: malignancy unknown      Destruction method: shave removal          Depression Screening and Follow-up Plan: Patient was screened for depression during today's encounter. They screened negative with a PHQ-2 score of 0.        Subjective      This is a 53-year-old white female who presents to the office today complaining of a mole on her left chest.  She tells me that it recently changed.  It has become black and crusty.  She tells me it seems to be coming off.  She denies any bleeding.  Also request referral to see dietitian.  She complains that she is having difficulty losing weight.  She has history of impaired fasting glucose.  She also wants to make sure that she is getting the proper nutrition with her cancer diagnosis.      Review of Systems   Skin:  Positive for color change.       Current Outpatient Medications on File Prior to Visit   Medication Sig    albuterol (Ventolin HFA) 90 mcg/act inhaler Inhale 2 puffs every 4 (four) hours as needed for wheezing or shortness of breath    ascorbic acid (VITAMIN C) 250 mg tablet Take 250 mg by mouth daily    Cod Liver Oil (COD LIVER PO) Take 1 Dose by mouth 3 (three) times a week     MAGNESIUM BISGLYCINATE PO     multivitamin (THERAGRAN) TABS Take 1 tablet by mouth daily    Probiotic Product (PROBIOTIC PO) Take 1 capsule by mouth daily     TURMERIC PO Take by mouth 2 (two) times a day    [DISCONTINUED] Efinaconazole 10 % SOLN Apply to affected toenails at bedtime    [DISCONTINUED] OLIVE LEAF PO Take 300 mg by mouth daily (Patient not taking: Reported on 2/15/2023)    [DISCONTINUED] ondansetron (ZOFRAN) 8 mg  "tablet Take 1 tablet (8 mg total) by mouth every 8 (eight) hours as needed for nausea or vomiting       Objective     BP (!) 175/79   Pulse 66   Temp 97.5 °F (36.4 °C) (Tympanic)   Ht 5' 1\" (1.549 m)   Wt 68.7 kg (151 lb 6.4 oz)   LMP 05/10/2015   SpO2 98%   BMI 28.61 kg/m²     Physical Exam  Vitals reviewed.   Constitutional:       Comments: This is a pleasant 53-year-old white female who appears her stated age.  Patient is pleasant, cooperative, and in no distress.   Skin:     Comments: There is a skin lesion at the superior aspect of the patient's left breast.  It is raised.  There is a part of the lesion that is black and part of the lesion is brown.  It has irregular borders.       Kanu Oliveros, DO    "

## 2024-04-11 NOTE — ASSESSMENT & PLAN NOTE
We discussed a biopsy of the lesion.  Patient was agreeable.  I prepped the skin in the usual fashion with alcohol.  I then used 1% lidocaine with epinephrine to raise a small wheal under the lesion as a local anesthetic.  After sufficient anesthesia was achieved, #15 scalpel was used to perform a shave biopsy.  The lesion was sent for pathology.  The base of the lesion was then fulgurated.  I lightly curetted the base with a 3 mm dermal curette to remove any remnants.  I then refulgurated the area with excellent hemostasis achieved.  Triple antibiotic ointment was applied followed by Band-Aid.  Wound care instructions were given to the patient.  After the patient had checked out, I learned that her current insurance is not excepted by [employer].  As such, the patient declined sending the specimen out for biopsy.  I reassured her as I suspect it is a benign nevus that became necrotic from rubbing on her bra strap although I think other causes needed to be ruled out.  However, I am fairly confident that it is benign and I reassured the patient.

## 2024-04-11 NOTE — ASSESSMENT & PLAN NOTE
Blood pressure today in the office was rechecked by me and found to be elevated at 140/90.  Blood pressure has been normal in the past.  I am simply going to recommend she follow a low-sodium diet and we will observe this for now.

## 2024-04-12 ENCOUNTER — TELEPHONE (OUTPATIENT)
Dept: HEMATOLOGY ONCOLOGY | Facility: CLINIC | Age: 54
End: 2024-04-12

## 2024-04-12 NOTE — TELEPHONE ENCOUNTER
Appointment Change  Cancel, Reschedule, Change to Virtual      Who are you speaking with? Patient   If it is not the patient, is the caller listed on the communication consent form? N/A   Which provider is the appointment scheduled with? Dr. Chirinos   When was the original appointment scheduled?    Please list date and time 4/23/24 2:30 PM   At which location is the appointment scheduled to take place? [place] ([facility])   Was the appointment rescheduled?     Was the appointment changed from an in person visit to a virtual visit?    If so, please list the details of the change. 5/2/24 2:30 PM  [place]   What is the reason for the appointment change? Pt is requesting an appt at the [place] location. Pt aware that is where her new appt is scheduled.        Was STAR transport scheduled? No   Does STAR transport need to be scheduled for the new visit (if applicable) No   Does the patient need an infusion appointment rescheduled? No   Does the patient have an upcoming infusion appointment scheduled? If so, when? No   Is the patient undergoing chemotherapy? No   For appointments cancelled with less than 24 hours:  Was the no-show policy reviewed? Yes

## 2024-04-19 ENCOUNTER — TELEPHONE (OUTPATIENT)
Dept: GYNECOLOGIC ONCOLOGY | Facility: CLINIC | Age: 54
End: 2024-04-19

## 2024-04-19 NOTE — TELEPHONE ENCOUNTER
Called patient on 4/19/2024 to see if she will be Self-Pay or has insurance. Patient wasn't available so I left a message and that if she did have insurance to bring along to appointment or she could up date it in her Mychart

## 2024-04-29 ENCOUNTER — TELEPHONE (OUTPATIENT)
Dept: GYNECOLOGIC ONCOLOGY | Facility: CLINIC | Age: 54
End: 2024-04-29

## 2024-04-29 LAB
25(OH)D3+25(OH)D2 SERPL-MCNC: 31 NG/ML (ref 30–100)
ALBUMIN SERPL-MCNC: 4.5 G/DL (ref 3.5–5.7)
ALP SERPL-CCNC: 50 U/L (ref 35–120)
ALT SERPL-CCNC: 20 U/L
ANION GAP SERPL CALCULATED.3IONS-SCNC: 9 MMOL/L (ref 3–11)
AST SERPL-CCNC: 18 U/L
BASOPHILS # BLD AUTO: 0.1 THOU/CMM (ref 0–0.1)
BASOPHILS NFR BLD AUTO: 1 %
BILIRUB SERPL-MCNC: 0.4 MG/DL (ref 0.2–1)
BUN SERPL-MCNC: 15 MG/DL (ref 7–25)
CALCIUM SERPL-MCNC: 9.5 MG/DL (ref 8.5–10.1)
CHLORIDE SERPL-SCNC: 102 MMOL/L (ref 100–109)
CHOLEST SERPL-MCNC: 238 MG/DL
CHOLEST/HDLC SERPL: 3.8 {RATIO}
CO2 SERPL-SCNC: 29 MMOL/L (ref 21–31)
CREAT SERPL-MCNC: 0.79 MG/DL (ref 0.4–1.1)
CYTOLOGY CMNT CVX/VAG CYTO-IMP: ABNORMAL
DIFFERENTIAL METHOD BLD: ABNORMAL
EOSINOPHIL # BLD AUTO: 0.6 THOU/CMM (ref 0–0.5)
EOSINOPHIL NFR BLD AUTO: 9 %
ERYTHROCYTE [DISTWIDTH] IN BLOOD BY AUTOMATED COUNT: 12.8 % (ref 12–16)
EST. AVERAGE GLUCOSE BLD GHB EST-MCNC: 131 MG/DL
GFR/BSA.PRED SERPLBLD CYS-BASED-ARV: 88 ML/MIN/{1.73_M2}
GLUCOSE SERPL-MCNC: 121 MG/DL (ref 65–99)
HBA1C MFR BLD: 6.2 %
HCT VFR BLD AUTO: 40.8 % (ref 35–43)
HDLC SERPL-MCNC: 62 MG/DL (ref 23–92)
HGB BLD-MCNC: 13.8 G/DL (ref 11.5–14.5)
LDLC SERPL CALC-MCNC: 144 MG/DL
LYMPHOCYTES # BLD AUTO: 1.9 THOU/CMM (ref 1–3)
LYMPHOCYTES NFR BLD AUTO: 26 %
MCH RBC QN AUTO: 30.5 PG (ref 26–34)
MCHC RBC AUTO-ENTMCNC: 33.8 G/DL (ref 32–37)
MCV RBC AUTO: 90 FL (ref 80–100)
MONOCYTES # BLD AUTO: 0.5 THOU/CMM (ref 0.3–1)
MONOCYTES NFR BLD AUTO: 7 %
NEUTROPHILS # BLD AUTO: 4 THOU/CMM (ref 1.8–7.8)
NEUTROPHILS NFR BLD AUTO: 57 %
NONHDLC SERPL-MCNC: 176 MG/DL
PLATELET # BLD AUTO: 236 THOU/CMM (ref 140–350)
PMV BLD REES-ECKER: 9.2 FL (ref 7.5–11.3)
POTASSIUM SERPL-SCNC: 4.4 MMOL/L (ref 3.5–5.2)
PROT SERPL-MCNC: 6.9 G/DL (ref 6.3–8.3)
RBC # BLD AUTO: 4.52 MILL/CMM (ref 3.7–4.7)
SODIUM SERPL-SCNC: 140 MMOL/L (ref 135–145)
TRIGL SERPL-MCNC: 159 MG/DL
WBC # BLD AUTO: 7.1 THOU/CMM (ref 4–10)

## 2024-04-29 NOTE — TELEPHONE ENCOUNTER
I left a message reminding the patient to get the  blood work done prior to her appointment with Dr. Chirinos on 5/2/2024 at 2:30PM.

## 2024-05-01 ENCOUNTER — TELEPHONE (OUTPATIENT)
Dept: FAMILY MEDICINE CLINIC | Facility: CLINIC | Age: 54
End: 2024-05-01

## 2024-05-01 NOTE — TELEPHONE ENCOUNTER
Called patient to give her the blood work results. Patient did not answer. Left a message for patient to call the office.

## 2024-05-01 NOTE — TELEPHONE ENCOUNTER
----- Message from Kanu Oliveros DO sent at 5/1/2024  3:52 PM EDT -----  Fasting blood sugar is 121 (<100) and Hgba1c is 6.2 (4.0-5.6)- This is still in the Pre-Diabetes range. T Chol 238 (<200), Triglycerides 159 (<150), LDL Chol 144 (<100), Vitamin D 31 Normal. CBC is normal. Highest her cholesterol has been in a few ye  ars. She needs to get back on track with her diet.

## 2024-05-02 ENCOUNTER — OFFICE VISIT (OUTPATIENT)
Dept: GYNECOLOGIC ONCOLOGY | Facility: CLINIC | Age: 54
End: 2024-05-02
Payer: COMMERCIAL

## 2024-05-02 ENCOUNTER — TELEPHONE (OUTPATIENT)
Dept: HEMATOLOGY ONCOLOGY | Facility: CLINIC | Age: 54
End: 2024-05-02

## 2024-05-02 VITALS
OXYGEN SATURATION: 98 % | BODY MASS INDEX: 28.4 KG/M2 | TEMPERATURE: 97.9 F | SYSTOLIC BLOOD PRESSURE: 146 MMHG | HEIGHT: 61 IN | HEART RATE: 80 BPM | DIASTOLIC BLOOD PRESSURE: 88 MMHG | WEIGHT: 150.4 LBS | RESPIRATION RATE: 16 BRPM

## 2024-05-02 DIAGNOSIS — Z85.43 HISTORY OF OVARIAN CANCER: Primary | ICD-10-CM

## 2024-05-02 DIAGNOSIS — Z12.31 BREAST CANCER SCREENING BY MAMMOGRAM: ICD-10-CM

## 2024-05-02 PROCEDURE — 99214 OFFICE O/P EST MOD 30 MIN: CPT | Performed by: OBSTETRICS & GYNECOLOGY

## 2024-05-02 NOTE — TELEPHONE ENCOUNTER
Patient Call    Who are you speaking with? Patient    If it is not the patient, are they listed on an active communication consent form? Yes   What is the reason for this call? Patient will call lab to see where  results are   Does this require a call back? No   If a call back is required, please list best call back number [phone]    If a call back is required, advise that a message will be forwarded to their care team and someone will return their call as soon as possible.   Did you relay this information to the patient? Yes

## 2024-05-02 NOTE — PROGRESS NOTES
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  She is now 5 years out and remains in a clinical remission.  She is due for mammogram this has been ordered.  We recommended following up for annual exams with 's at that time.  Will see the patient back in 1 year for repeat evaluation.         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 for surveillance      Problem:  Cancer Staging   History of ovarian cancer  Staging form: Ovary, Fallopian Tube, Primary Peritoneal, AJCC 8th Edition  - Clinical stage from 5/28/2019: FIGO Stage I (cT1, cN0, cM0) - Signed by Jace Chirinos MD on 5/28/2019  - Pathologic stage from 5/28/2019: FIGO Stage IC1, calculated as Stage IC (pT1c1, pN0, cM0) - Signed by Jace Chirinos MD on 5/28/2019        Previous therapy:  Oncology History   History of ovarian cancer   5/17/2019 Initial Diagnosis    Malignant neoplasm of left ovary (HCC)     5/17/2019 Surgery    Patient underwent total abdominal hysterectomy bilateral salpingo oophorectomy radical omentectomy bilateral pelvic lymphadenectomy and staging procedure for 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.     5/28/2019 -  Cancer Staged    Staging form: Ovary, Fallopian Tube, Primary Peritoneal, AJCC 8th Edition  - Pathologic stage from 5/28/2019: FIGO Stage IC1, calculated as Stage IC (pT1c1, pN0, cM0) - Signed by Jace Chirinos MD on 5/28/2019  Histologic grade (G): G3  Histologic grading system: 4 grade system  Residual tumor volume after primary cytoreductive surgery: No  gross tumor  Stage used in treatment planning: Yes  National guidelines used in treatment planning: Yes       5/28/2019 -  Cancer Staged    Staging form: Ovary, Fallopian Tube, Primary Peritoneal, AJCC 8th Edition  - Clinical stage from 5/28/2019: FIGO Stage I (cT1, cN0, cM0) - Signed by Jace Chirinos MD on 5/28/2019  Stage used in treatment planning: Yes  National guidelines used in treatment planning: Yes        Chemotherapy    Carbo platinum area under the curve of 6 paclitaxel 175 milligrams/meter squared for 3-6 cycles. Patient refused treatment           Patient ID: Ida Toussaint is a 53 y.o. female  Patient is a very pleasant 53-year-old female with a history of stage I C1 ovarian carcinoma clear-cell subtype diagnosed and treated in May 2019.  She underwent exploratory laparotomy ARABELLA/BSO staging followed by carboplatin and paclitaxel therapy.  She has been disease-free ever since.  She presents  today for further evaluation.  Most recent  equals 8    Today, the patient is doing well.  She denies significant abdominal pain, pelvic pain, nausea, vomiting, constipation, diarrhea, fevers, chills, or vaginal bleeding.           The following portions of the patient's history were reviewed and updated as appropriate: allergies, current medications, past family history, past medical history, past social history, past surgical history, and problem list.    Review of Systems    Current Outpatient Medications   Medication Sig Dispense Refill    albuterol (Ventolin HFA) 90 mcg/act inhaler Inhale 2 puffs every 4 (four) hours as needed for wheezing or shortness of breath 18 g 1    ascorbic acid (VITAMIN C) 250 mg tablet Take 250 mg by mouth daily      Cod Liver Oil (COD LIVER PO) Take 1 Dose by mouth 3 (three) times a week       MAGNESIUM BISGLYCINATE PO       multivitamin (THERAGRAN) TABS Take 1 tablet by mouth daily      Probiotic Product (PROBIOTIC PO) Take 1 capsule by mouth daily       TURMERIC PO Take by  "mouth 2 (two) times a day       No current facility-administered medications for this visit.           Objective:    Blood pressure 146/88, pulse 80, temperature 97.9 °F (36.6 °C), temperature source Temporal, resp. rate 16, height 5' 1\" (1.549 m), weight 68.2 kg (150 lb 6.4 oz), last menstrual period 05/10/2015, SpO2 98%.  Body mass index is 28.42 kg/m².  Body surface area is 1.67 meters squared.    Physical Exam    Lab Results   Component Value Date     7.3 02/22/2021     Lab Results   Component Value Date     10/23/2014    K 4.4 04/29/2024     04/29/2024    CO2 29 04/29/2024    ANIONGAP 9 10/23/2014    BUN 15 04/29/2024    CREATININE 0.79 04/29/2024    GLUCOSE 132 10/23/2014    GLUF 109 (H) 02/22/2021    CALCIUM 9.5 04/29/2024    AST 18 04/29/2024    ALT 20 04/29/2024    ALKPHOS 50 04/29/2024    PROT 8.3 (H) 10/23/2014    BILITOT 0.2 10/23/2014    EGFR 88 04/29/2024     Lab Results   Component Value Date    WBC 7.1 04/29/2024    HGB 13.8 04/29/2024    HCT 40.8 04/29/2024    MCV 90 04/29/2024     04/29/2024     Lab Results   Component Value Date    NEUTROABS 4.0 04/29/2024        Trend:  Lab Results   Component Value Date     7.3 02/22/2021     6.3 11/30/2020     5.3 08/24/2020     5.0 05/21/2020     4.3 12/27/2019     4.9 09/16/2019     45.4 (H) 05/10/2019                 "

## 2024-05-02 NOTE — ASSESSMENT & PLAN NOTE
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  She is now 5 years out and remains in a clinical remission.  She is due for mammogram this has been ordered.  We recommended following up for annual exams with 's at that time.  Will see the patient back in 1 year for repeat evaluation.

## 2024-05-06 ENCOUNTER — HOSPITAL ENCOUNTER (OUTPATIENT)
Dept: MAMMOGRAPHY | Facility: HOSPITAL | Age: 54
Discharge: HOME | End: 2024-05-06
Payer: COMMERCIAL

## 2024-05-06 DIAGNOSIS — Z12.31 BREAST CANCER SCREENING BY MAMMOGRAM: ICD-10-CM

## 2024-05-06 PROCEDURE — 77063 BREAST TOMOSYNTHESIS BI: CPT

## 2024-05-06 PROCEDURE — 77067 SCR MAMMO BI INCL CAD: CPT

## 2024-05-07 ENCOUNTER — OFFICE VISIT (OUTPATIENT)
Dept: FAMILY MEDICINE CLINIC | Facility: CLINIC | Age: 54
End: 2024-05-07
Payer: COMMERCIAL

## 2024-05-07 ENCOUNTER — CLINICAL SUPPORT (OUTPATIENT)
Dept: NUTRITION | Facility: HOSPITAL | Age: 54
End: 2024-05-07
Attending: FAMILY MEDICINE

## 2024-05-07 VITALS
HEIGHT: 61 IN | OXYGEN SATURATION: 97 % | WEIGHT: 152.6 LBS | SYSTOLIC BLOOD PRESSURE: 141 MMHG | HEART RATE: 71 BPM | DIASTOLIC BLOOD PRESSURE: 74 MMHG | BODY MASS INDEX: 28.81 KG/M2 | TEMPERATURE: 97.7 F

## 2024-05-07 VITALS — BODY MASS INDEX: 28.32 KG/M2 | HEIGHT: 61 IN | WEIGHT: 150 LBS

## 2024-05-07 DIAGNOSIS — E78.5 DYSLIPIDEMIA: ICD-10-CM

## 2024-05-07 DIAGNOSIS — R05.9 COUGH: ICD-10-CM

## 2024-05-07 DIAGNOSIS — C56.9 MALIGNANT NEOPLASM OF OVARY, UNSPECIFIED LATERALITY (HCC): ICD-10-CM

## 2024-05-07 DIAGNOSIS — R73.01 IMPAIRED FASTING GLUCOSE: ICD-10-CM

## 2024-05-07 DIAGNOSIS — Z00.00 ANNUAL PHYSICAL EXAM: Primary | ICD-10-CM

## 2024-05-07 DIAGNOSIS — Z78.0 POST-MENOPAUSE: ICD-10-CM

## 2024-05-07 PROCEDURE — 99396 PREV VISIT EST AGE 40-64: CPT | Performed by: FAMILY MEDICINE

## 2024-05-07 RX ORDER — ALBUTEROL SULFATE 90 UG/1
2 AEROSOL, METERED RESPIRATORY (INHALATION) EVERY 4 HOURS PRN
Qty: 18 G | Refills: 1 | Status: SHIPPED | OUTPATIENT
Start: 2024-05-07

## 2024-05-07 NOTE — PROGRESS NOTES
ADULT ANNUAL PHYSICAL  [facility] PRIMARY CARE    NAME: Ida Toussaint  AGE: 53 y.o. SEX: female  : 1970     DATE: 2024     Assessment and Plan:     Problem List Items Addressed This Visit       Dyslipidemia    Relevant Orders    Lipid Panel with Direct LDL reflex    Comprehensive metabolic panel    Impaired fasting glucose    Relevant Orders    Hemoglobin A1C    Annual physical exam - Primary     This is a 53-year-old white female who presents to the office today for her annual physical exam.  I did review the patient's immunization records.  I am recommending annual influenza vaccine for this patient every fall.  I reviewed the patient's family history.  Her mother is .  Her mother had pancreatic cancer, schizophrenia, and diabetes.  Her father  from bladder cancer.  He had hypertension, hyperlipidemia, and diabetes.  Her brother  in a motorcycle accident.  Her paternal grandfather  from prostate cancer.  I reviewed the patient's recent labs.  Her CBC was unremarkable.  Fasting blood sugar and hemoglobin A1c were both noted to be elevated, consistent with impaired fasting glucose.  The patient and I spoke at length today regarding diet and exercise.  The patient had just come from the dietitian.  Patient is planning on beginning an aerobic exercise program in addition to her walking.  I also reviewed the patient's lipid panel.  At this time, would not recommend any medication for treatment of her lipids.  I am concerned about her blood pressure.  I checked her blood pressure myself today and found it to be somewhat elevated at 144/80.  When I last saw her blood pressure was high.  When she saw Dr. Chirinos, I noted her blood pressure was also high.  It appears patient is hypertensive.  I am going to give the patient a trial of diet and exercise.  I asked her to lose 10 to 12 pounds by her next visit with me.  She needs to follow a  low-sodium diet and exercise.  If blood pressure does not come down, then I will need to consider starting this patient on blood pressure medication.  I did order repeat fasting labs for her next office visit as well.         Malignant neoplasm of ovary, unspecified laterality (HCC)    Relevant Orders    CBC and differential    Cough    Relevant Medications    albuterol (Ventolin HFA) 90 mcg/act inhaler    Post-menopause    Relevant Orders    DXA bone density spine hip and pelvis       Immunizations and preventive care screenings were discussed with patient today. Appropriate education was printed on patient's after visit summary.    Counseling:  Exercise: the importance of regular exercise/physical activity was discussed. Recommend exercise 3-5 times per week for at least 30 minutes.          Return in about 6 months (around 11/7/2024).     Chief Complaint:     Chief Complaint   Patient presents with    Annual Exam      History of Present Illness:     Adult Annual Physical   Patient here for a comprehensive physical exam. The patient reports problems - PND .    Diet and Physical Activity  Diet/Nutrition: consuming 3-5 servings of fruits/vegetables daily and adequate fiber intake.   Exercise: walking and plans to start aerobic exercise .      Depression Screening  PHQ-2/9 Depression Screening           General Health  Sleep: gets 7-8 hours of sleep on average.   Hearing: normal - bilateral.  Vision: no vision problems and wears glasses.   Dental: regular dental visits.       /GYN Health  Follows with gynecology? yes   Patient is: postmenopausal  Last menstrual period: N/A  Contraceptive method:  s/p hysterectomy .    Advanced Care Planning  Do you have an advanced directive? no  Do you have a durable medical power of ? no  ACP document given to the patient? no     Review of Systems:     Review of Systems   HENT:  Positive for congestion and rhinorrhea.    Respiratory:  Positive for cough. Negative for  shortness of breath.    Cardiovascular:  Negative for chest pain, palpitations and leg swelling.   Gastrointestinal:  Negative for abdominal distention, abdominal pain, blood in stool, constipation, diarrhea and nausea.   Genitourinary:  Negative for dysuria, frequency and urgency.   Allergic/Immunologic: Positive for environmental allergies.      Past Medical History:     Past Medical History:   Diagnosis Date    Asthma     Cancer (HCC) 2019    Ovarian cancer (HCC) 2019    Ovarian cyst       Past Surgical History:     Past Surgical History:   Procedure Laterality Date    COLONOSCOPY      HYSTERECTOMY  5/16/19    OVARIAN CYST SURGERY      MI LAPS REPAIR HERNIA EXCEPT INCAL/INGUN REDUCIBLE N/A 01/21/2020    Procedure: LAPAROSCOPIC VENTRAL HERNIA REPAIR WITH MESH;  Surgeon: Bradford Silverman MD;  Location:  MAIN OR;  Service: General    MI TOTAL ABDOMINAL HYSTERECT W/WO RMVL TUBE OVARY N/A 05/16/2019    Procedure: TOTAL ABDOMINAL HYSTERECTOMY, BILATERAL SALPINGO-OOPHORECTOMY, PELVIC LYMPHNODE DISSECTION, RADICAL OMENTECTOMY, STAGING BIOPSIES;  Surgeon: Jace Chirinos MD;  Location:  MAIN OR;  Service: Gynecology Oncology    TONSILLECTOMY        Social History:     Social History     Socioeconomic History    Marital status: /Civil Union     Spouse name: None    Number of children: None    Years of education: None    Highest education level: None   Occupational History    None   Tobacco Use    Smoking status: Never     Passive exposure: Never    Smokeless tobacco: Never   Vaping Use    Vaping status: Never Used   Substance and Sexual Activity    Alcohol use: Not Currently    Drug use: No    Sexual activity: Yes     Partners: Male     Birth control/protection: None     Comment:    Other Topics Concern    None   Social History Narrative    None     Social Determinants of Health     Financial Resource Strain: Not on file   Food Insecurity: Not on file   Transportation Needs: Not on file   Physical  "Activity: Not on file   Stress: Not on file   Social Connections: Not on file   Intimate Partner Violence: Not on file   Housing Stability: Not on file      Family History:     Family History   Problem Relation Age of Onset    Diabetes Mother     Pancreatic cancer Mother 58    Diabetes Father     Cancer Father         Bladder cancer    No Known Problems Maternal Grandmother     No Known Problems Maternal Grandfather     No Known Problems Paternal Grandmother     Bone cancer Paternal Grandfather     Prostate cancer Paternal Grandfather     Brain cancer Maternal Aunt     No Known Problems Maternal Aunt       Current Medications:     Current Outpatient Medications   Medication Sig Dispense Refill    albuterol (Ventolin HFA) 90 mcg/act inhaler Inhale 2 puffs every 4 (four) hours as needed for wheezing or shortness of breath 18 g 1    ascorbic acid (VITAMIN C) 250 mg tablet Take 250 mg by mouth daily      Cod Liver Oil (COD LIVER PO) Take 1 Dose by mouth 3 (three) times a week       MAGNESIUM BISGLYCINATE PO       multivitamin (THERAGRAN) TABS Take 1 tablet by mouth daily      Probiotic Product (PROBIOTIC PO) Take 1 capsule by mouth daily       TURMERIC PO Take by mouth 2 (two) times a day       No current facility-administered medications for this visit.      Allergies:     Allergies   Allergen Reactions    Penicillins      Childhood reaction      Physical Exam:     /74   Pulse 71   Temp 97.7 °F (36.5 °C) (Tympanic)   Ht 5' 1\" (1.549 m)   Wt 69.2 kg (152 lb 9.6 oz)   LMP 05/10/2015   SpO2 97%   BMI 28.83 kg/m²     Physical Exam  Vitals reviewed.   Constitutional:       Comments: This patient is a 53-year-old white female who appears her stated age.  The patient is pleasant, cooperative, and in no distress.   HENT:      Head: Normocephalic and atraumatic.      Right Ear: Tympanic membrane, ear canal and external ear normal. There is no impacted cerumen.      Left Ear: Tympanic membrane, ear canal and " external ear normal. There is no impacted cerumen.      Nose:      Comments: Positive nasal congestion with pale nasal turbinates and clear rhinorrhea present.     Mouth/Throat:      Mouth: Mucous membranes are moist.      Pharynx: Oropharynx is clear. No oropharyngeal exudate or posterior oropharyngeal erythema.   Eyes:      General: No scleral icterus.        Right eye: No discharge.         Left eye: No discharge.      Conjunctiva/sclera: Conjunctivae normal.      Pupils: Pupils are equal, round, and reactive to light.   Cardiovascular:      Rate and Rhythm: Normal rate and regular rhythm.      Heart sounds: Normal heart sounds. No murmur heard.     No friction rub. No gallop.   Pulmonary:      Effort: Pulmonary effort is normal. No respiratory distress.      Breath sounds: Normal breath sounds. No stridor. No wheezing, rhonchi or rales.   Abdominal:      General: Bowel sounds are normal. There is no distension.      Palpations: Abdomen is soft. There is no mass.      Tenderness: There is no abdominal tenderness. There is no guarding.   Musculoskeletal:      Cervical back: Neck supple.   Lymphadenopathy:      Cervical: No cervical adenopathy.   Psychiatric:         Mood and Affect: Mood normal.         Behavior: Behavior normal.         Thought Content: Thought content normal.         Judgment: Judgment normal.     Extremities: Without cyanosis, clubbing, or edema    Kanu Oliveros DO  [facility]

## 2024-05-07 NOTE — PROGRESS NOTES
Initial Nutrition Assessment Form    Patient Name: Ida Toussaint    YOB: 1970    Sex: Female     Assessment Date: 5/7/2024  Start Time: 1 pm Stop Time: 2:30 pm Total Minutes: 90     Data:  Present at session: self   Parent Concerns: Patient concerned about her recent weight gain, not feeling good and increasing blood sugars.  She followed Blood Type Diet for 2 years and lost a lot of weight.  She has been gaining weight since re-introducing carb.  She does not eat meat.  Will have small amounts of turkey, fish, seafood, eggs, nuts, beans.   Medical Dx/Reason for Referral: C56.9 (ICD-10-CM) - Malignant neoplasm of ovary, unspecified laterality (HCC)R73.01 (ICD-10-CM) - Impaired fasting glucose   Past Medical History:   Diagnosis Date    Asthma     Cancer (HCC) 2019    Ovarian cancer (HCC) 2019    Ovarian cyst        Current Outpatient Medications   Medication Sig Dispense Refill    albuterol (Ventolin HFA) 90 mcg/act inhaler Inhale 2 puffs every 4 (four) hours as needed for wheezing or shortness of breath 18 g 1    ascorbic acid (VITAMIN C) 250 mg tablet Take 250 mg by mouth daily      Cod Liver Oil (COD LIVER PO) Take 1 Dose by mouth 3 (three) times a week       MAGNESIUM BISGLYCINATE PO       multivitamin (THERAGRAN) TABS Take 1 tablet by mouth daily      Probiotic Product (PROBIOTIC PO) Take 1 capsule by mouth daily       TURMERIC PO Take by mouth 2 (two) times a day       No current facility-administered medications for this visit.        Additional Meds/Supplements: Medications reviewed   Special Learning Needs: none   Height: HC Readings from Last 3 Encounters:   No data found for HC      Weight: Wt Readings from Last 3 Encounters:   05/07/24 69.2 kg (152 lb 9.6 oz)   05/07/24 68 kg (150 lb)   05/02/24 68.2 kg (150 lb 6.4 oz)     Body mass index is 28.34 kg/m².   Recent Weight Change: [x]Yes     []No  Amount: Gradual weight gain over last few years      Energy Needs: ~ 1500 calories/day (30 gentry/kg  IbW)   Allergies   Allergen Reactions    Penicillins      Childhood reaction       Social History     Substance and Sexual Activity   Alcohol Use Not Currently       Social History     Tobacco Use   Smoking Status Never    Passive exposure: Never   Smokeless Tobacco Never       Who shops? patient   Who cooks? patient   Exercise: Walk but not daily.  Has exercise equipment available to her.   Prior Counseling? []Yes     [x]No  When:      Why:         Diet Hx:  Breakfast: Breakfast sandwich with egg, swiss cheese, mushroom, avocado on english muffin or core bar; water    May eat out for breakfast in which case will have full breakfast including eggs, vegetables, potatoes    Lunch: Pizza/ziti (at baseball game), sauteed vegetables in pasta sauce with gnocchi or egg roll with duck sauce          Dinner: Salad, fish, starch; intake from eating out.  Would like to eat more balanced meals with starch, vegetable and protein but not animal protein          Snacks: Limited snacking        Nutrition Diagnosis:   Inconsistent carbohydrate intake  intake related to Physiological causes requiring careful timing and consistency in the amount of carbohydrate (i.e. diabetes mellitus, hypoglycemia) as evidenced by  Estimated carbohydrate intake that is different from recommended types or ingested on an irregular basis       Medical Nutrition Therapy Intervention:  [x]Individualized Meal Plan:  reviewed meal plan to include all food groups including carbs and protein sources other than meat; encouraged patient to add more beans, tofu, iftikhar daquan mushrooms, etc to provide protein in diet [x]Understanding Lab Values:  reviewed A1c, lipid panel and vit D levels   []Basic Pathophysiology of Disease []Food/Medication Interactions   []Food Diary [x]Exercise:  encouraged at least 150 minutes exercise per week   [x]Lifestyle/Behavior Modification Techniques:  plan lunch as patient reports that is her worst time and often will pick or grab  something in a rush []Medication, Mechanism of Action   [x]Label Reading:  reviewed label reading for serving size, good fat vs bad fat, total carbs and added sugars []Self Blood Glucose Monitoring   [x]Weight/BMI Goals:  5% wt loss [x]Other - 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  Encouraged patient to schedule her exercise and treat it like a doctor's appointment.   Other Notes:        Comprehension: []Excellent  []Very Good  [x]Good  []Fair   []Poor    Receptivity: []Excellent  []Very Good  [x]Good  []Fair   []Poor    Expected Compliance: []Excellent  []Very Good  [x]Good  []Fair   []Poor        Goals:   Add protein back into diet from vegetable sources at dinner meal, examples provided   2.     Add 150 minutes of exercise per week   3.     Increase carb intake from various foods including fruits, vegetables, dairy and grains, at least 130 g/day       Follow up to be determined.  RD contact information provided.   Labs:  CMP  Lab Results   Component Value Date     10/23/2014    K 4.4 04/29/2024     04/29/2024    CO2 29 04/29/2024    ANIONGAP 9 10/23/2014    BUN 15 04/29/2024    CREATININE 0.79 04/29/2024    GLUCOSE 132 10/23/2014    GLUF 109 (H) 02/22/2021    CALCIUM 9.5 04/29/2024    AST 18 04/29/2024    ALT 20 04/29/2024    ALKPHOS 50 04/29/2024    PROT 8.3 (H) 10/23/2014    BILITOT 0.2 10/23/2014    EGFR 88 04/29/2024       BMP  Lab Results   Component Value Date    GLUCOSE 132 10/23/2014    CALCIUM 9.5 04/29/2024     10/23/2014    K 4.4 04/29/2024    CO2 29 04/29/2024     04/29/2024    BUN 15 04/29/2024    CREATININE 0.79 04/29/2024       Lipids  Lab Results   Component Value Date    CHOL 213 09/23/2014     Lab Results   Component Value Date    HDL 62 04/29/2024    HDL 71 02/22/2021    HDL 74 08/24/2020     Lab Results   Component Value Date    LDLCALC 144 (H) 04/29/2024    LDLCALC 121 (H) 02/22/2021    LDLCALC 124 (H) 08/24/2020     Lab Results   Component  "Value Date    TRIG 159 (H) 04/29/2024    TRIG 60 02/22/2021    TRIG 51 08/24/2020     No results found for: \"CHOLHDL\"    Hemoglobin A1C  Lab Results   Component Value Date    HGBA1C 6.2 (H) 04/29/2024       Fasting Glucose  Lab Results   Component Value Date    GLUF 109 (H) 02/22/2021       Insulin     Thyroid  Lab Results   Component Value Date    TSH 2.13 06/28/2022       Hepatic Function Panel  Lab Results   Component Value Date    ALT 20 04/29/2024    AST 18 04/29/2024    ALKPHOS 50 04/29/2024    BILITOT 0.2 10/23/2014       Celiac Disease Antibody Panel  No results found for: \"ENDOMYSIAL IGA\", \"GLIADIN IGA\", \"GLIADIN IGG\", \"IGA\", \"TISSUE TRANSGLUT AB\", \"TTG IGA\"   Iron  No results found for: \"IRON\", \"TIBC\", \"FERRITIN\"    Vitamins  No results found for: \"VITAMIN B2\"   No results found for: \"NICOTINAMIDE\", \"NICOTINIC ACID\"   No results found for: \"VITAMINB6\"  No results found for: \"UYGKJJKG25\"  No results found for: \"VITB5\"  No results found for: \"K2ATRRHC\"  No results found for: \"THYROGLB\"  No results found for: \"VITAMIN K\"   25-HYDROXY VIT D   Date Value Ref Range Status   04/29/2024 31 30 - 100 ng/mL Final     Comment:     Interpretive information: Total Vitamin D, 25-Hydroxy                                              This assay quantifies the sum of vitamin D3,   25-hydroxy and vitamin D2, 25-hydroxy.                                                <10    ng/mL  Deficiency     10-30  ng/mL  Insufficiency      ng/mL  Sufficiency     >100   ng/mL  Toxicity        No components found for: \"VITAMINE\"     Rebecca Askew RD  [facility]  [address] [place] [phone]      "

## 2024-05-07 NOTE — ASSESSMENT & PLAN NOTE
This is a 53-year-old white female who presents to the office today for her annual physical exam.  I did review the patient's immunization records.  I am recommending annual influenza vaccine for this patient every fall.  I reviewed the patient's family history.  Her mother is .  Her mother had pancreatic cancer, schizophrenia, and diabetes.  Her father  from bladder cancer.  He had hypertension, hyperlipidemia, and diabetes.  Her brother  in a motorcycle accident.  Her paternal grandfather  from prostate cancer.  I reviewed the patient's recent labs.  Her CBC was unremarkable.  Fasting blood sugar and hemoglobin A1c were both noted to be elevated, consistent with impaired fasting glucose.  The patient and I spoke at length today regarding diet and exercise.  The patient had just come from the dietitian.  Patient is planning on beginning an aerobic exercise program in addition to her walking.  I also reviewed the patient's lipid panel.  At this time, would not recommend any medication for treatment of her lipids.  I am concerned about her blood pressure.  I checked her blood pressure myself today and found it to be somewhat elevated at 144/80.  When I last saw her blood pressure was high.  When she saw Dr. Chirinos, I noted her blood pressure was also high.  It appears patient is hypertensive.  I am going to give the patient a trial of diet and exercise.  I asked her to lose 10 to 12 pounds by her next visit with me.  She needs to follow a low-sodium diet and exercise.  If blood pressure does not come down, then I will need to consider starting this patient on blood pressure medication.  I did order repeat fasting labs for her next office visit as well.

## 2024-05-14 ENCOUNTER — HOSPITAL ENCOUNTER (OUTPATIENT)
Dept: BONE DENSITY | Facility: HOSPITAL | Age: 54
Discharge: HOME | End: 2024-05-14
Attending: FAMILY MEDICINE
Payer: COMMERCIAL

## 2024-05-14 VITALS — HEIGHT: 61 IN | WEIGHT: 150 LBS | BODY MASS INDEX: 28.32 KG/M2

## 2024-05-14 DIAGNOSIS — Z78.0 POST-MENOPAUSE: ICD-10-CM

## 2024-05-14 PROCEDURE — 77080 DXA BONE DENSITY AXIAL: CPT

## 2024-05-15 NOTE — RESULT ENCOUNTER NOTE
Your recent bone density showed osteopenia. This is a weakening of the bones but not osteoporosis. Recommended treatment is Calcium, 500-600 mg twice a day, and Vitamin D3, at least 400 IU daily. Exercise is also recommended. I will go over this at your next office visit.

## 2024-06-06 PROBLEM — R05.9 COUGH: Status: RESOLVED | Noted: 2024-05-07 | Resolved: 2024-06-06

## 2024-08-07 ENCOUNTER — TELEMEDICINE (OUTPATIENT)
Dept: FAMILY MEDICINE CLINIC | Facility: CLINIC | Age: 54
End: 2024-08-07
Payer: COMMERCIAL

## 2024-08-07 ENCOUNTER — TELEPHONE (OUTPATIENT)
Age: 54
End: 2024-08-07

## 2024-08-07 VITALS — BODY MASS INDEX: 28.15 KG/M2 | WEIGHT: 149 LBS | TEMPERATURE: 99.1 F

## 2024-08-07 DIAGNOSIS — U07.1 COVID-19 VIRUS INFECTION: Primary | ICD-10-CM

## 2024-08-07 PROCEDURE — 99213 OFFICE O/P EST LOW 20 MIN: CPT | Performed by: FAMILY MEDICINE

## 2024-08-07 RX ORDER — NIRMATRELVIR AND RITONAVIR 300-100 MG
3 KIT ORAL 2 TIMES DAILY
Qty: 30 TABLET | Refills: 0 | Status: SHIPPED | OUTPATIENT
Start: 2024-08-07 | End: 2024-08-12

## 2024-08-07 NOTE — PROGRESS NOTES
COVID-19 Outpatient Progress Note  Name: Ida Tosusaint      : 1970      MRN: [id number]  Encounter Provider: Kanu Oliveros DO  Encounter Date: 2024   Encounter department: [facility]    Assessment & Plan   1. COVID-19 virus infection  Assessment & Plan:  Patient has COVID-19 virus infection.  Today is day #3.  I recommended vitamin C, vitamin D, and zinc.  We discussed symptomatic treatment as well.  The patient qualifies for Paxlovid therapy.  I discussed this with her and she did not want Paxlovid.  She took it several years ago as well.  I reviewed her records and her renal function is normal.  I sent a prescription for Paxlovid to [facility] pharmacy.  We discussed potential side effects from the medication.  I discussed new recommendations from the CDC.  Since the patient is not febrile, she does not need to quarantine.  However, I have recommended that she wear a mask for a total of 10 days.  As such, she needs to wear a mask for an additional 7 days.  Patient has been advised to drink plenty of fluids and rest.  She should call me immediately if she develops any shortness of breath.  Call if no improvement or worsens.  Orders:  -     nirmatrelvir & ritonavir (Paxlovid, 300/100,) tablet therapy pack; Take 3 tablets by mouth 2 (two) times a day for 5 days Take 2 nirmatrelvir tablets + 1 ritonavir tablet together per dose    Disposition:     Discussed symptom directed medication options with patient. Discussed vitamin D, vitamin C, and/or zinc supplementation with patient.     Patient meets criteria for Paxlovid and they have been counseled appropriately regarding risks, benefits, side effects, and alternative treatment options. After discussion, patient agrees to treatment.    Possible side effects of Paxlovid?    Possible side effects of Paxlovid are:  - Liver Problems. Notify us right away if you start to experience loss of appetite, yellowing of your skin and the whites of eyes  (jaundice), dark-colored urine, pale colored stools and itchy skin, stomach area (abdominal) pain.  - Resistance to HIV Medicines. If you have untreated HIV infection, Paxlovid may lead to some HIV medicines not working as well in the future.  - Other possible side effects include: altered sense of taste, diarrhea, high blood pressure, or muscle aches.    I have spent a total time of 15 minutes on the day of the encounter for this patient including          Encounter provider: Kanu Oliveros DO     Provider located at: [facility]  [address] [address] [phone]     Recent Visits  No visits were found meeting these conditions.  Showing recent visits within past 7 days and meeting all other requirements  Today's Visits  Date Type Provider Dept   08/07/24 Telemedicine Kanu Oliveros DO [facility]   Showing today's visits and meeting all other requirements  Future Appointments  No visits were found meeting these conditions.  Showing future appointments within next 150 days and meeting all other requirements    History of Present Illness      This virtual check-in was done via rumr and patient was informed that this is a secure, HIPAA-compliant platform. She agrees to proceed.    Patient agrees to participate in a virtual check in via telephone or video visit instead of presenting to the office to address urgent/immediate medical needs. Patient is aware this is a billable service. She acknowledged consent and understanding of privacy and security of the video platform. The patient has agreed to participate and understands they can discontinue the visit at any time.    After connecting through Wikidot, the patient was identified by name and date of birth. Ida Toussaint was informed that this was a telemedicine visit and that the exam was being conducted confidentially over secure lines. My office door was closed. No one else  was in the room. Ida Toussaint acknowledged consent and understanding of privacy and security of the telemedicine visit. I informed the patient that I have reviewed her record in Epic and presented the opportunity for her to ask any questions regarding the visit today. The patient agreed to participate.     Verification of patient location:  Patient is located in the following state in which I hold an active license: [place]    Subjective:   Ida Toussaint is a 54 y.o. female who has been screened for COVID-19. Symptom change since last report: improving. Patient's symptoms include fever, chills, fatigue, malaise, sore throat, cough, myalgias and headache. Patient denies congestion, rhinorrhea, anosmia, loss of taste, shortness of breath, chest tightness, abdominal pain, nausea, vomiting and diarrhea.     - Date of symptom onset: 8/4/2024  - Date of positive COVID-19 test: 8/5/2024. Type of test: Home antigen.     COVID-19 vaccination status: Not vaccinated    Lab Results   Component Value Date    SARSCOV2 Negative 03/25/2022    SARSCOVAG Positive (A) 05/31/2022       Review of Systems   Constitutional:  Positive for chills, fatigue and fever.   HENT:  Positive for postnasal drip and sore throat. Negative for congestion and rhinorrhea.    Respiratory:  Positive for cough. Negative for chest tightness, shortness of breath and wheezing.    Gastrointestinal:  Negative for abdominal pain, diarrhea, nausea and vomiting.   Musculoskeletal:  Positive for myalgias.   Neurological:  Positive for headaches.     Objective     Temp 99.1 °F (37.3 °C)   Wt 67.6 kg (149 lb)   LMP 05/10/2015   BMI 28.15 kg/m²     Physical Exam  Vitals reviewed.   Constitutional:       Comments: This is a 54-year-old white female who was seen on a video visit.  She appeared to be lying on the sofa with a blanket over her.  She looks ill but does not appear septic.  She did not appear to be in any distress.   HENT:      Head: Normocephalic and atraumatic.       Mouth/Throat:      Mouth: Mucous membranes are moist.      Pharynx: Oropharynx is clear. No oropharyngeal exudate or posterior oropharyngeal erythema.   Eyes:      General: No scleral icterus.        Right eye: No discharge.         Left eye: No discharge.      Conjunctiva/sclera: Conjunctivae normal.      Pupils: Pupils are equal, round, and reactive to light.   Cardiovascular:      Comments: No cyanosis or clubbing of the fingers  Pulmonary:      Comments: Patient did not cough during her video visit.  She was not tachypneic.  There is no audible wheezing.  She was in no apparent respiratory distress.  Musculoskeletal:      Cervical back: Neck supple.

## 2024-08-07 NOTE — TELEPHONE ENCOUNTER
Patient states she tested positive for Covid on Monday, 8/5/24.    Symptoms started on Sunday, 8/4/24 around 11:30 pm.    Patient is experiencing the following symptoms and would like medication to treat all symptoms include extremely sore throat:    Sore throat;  Cough with discolored mucus; and   Fever between  degrees.    Patient would like script for Paxlovid and additional medications to treat symptoms filled with [facility].    Please contact patient once prescriptions have been sent to pharmacy.

## 2024-08-07 NOTE — TELEPHONE ENCOUNTER
Any Covid + patients need to be scheduled for a virtual appointment. She was put on the schedule today. No medications can be dispensed without the visit.

## 2024-08-08 NOTE — ASSESSMENT & PLAN NOTE
Patient has COVID-19 virus infection.  Today is day #3.  I recommended vitamin C, vitamin D, and zinc.  We discussed symptomatic treatment as well.  The patient qualifies for Paxlovid therapy.  I discussed this with her and she did not want Paxlovid.  She took it several years ago as well.  I reviewed her records and her renal function is normal.  I sent a prescription for Paxlovid to [facility] pharmacy.  We discussed potential side effects from the medication.  I discussed new recommendations from the CDC.  Since the patient is not febrile, she does not need to quarantine.  However, I have recommended that she wear a mask for a total of 10 days.  As such, she needs to wear a mask for an additional 7 days.  Patient has been advised to drink plenty of fluids and rest.  She should call me immediately if she develops any shortness of breath.  Call if no improvement or worsens.

## 2025-06-10 ENCOUNTER — OFFICE VISIT (OUTPATIENT)
Dept: GYNECOLOGIC ONCOLOGY | Facility: CLINIC | Age: 55
End: 2025-06-10
Payer: COMMERCIAL

## 2025-06-10 VITALS
RESPIRATION RATE: 17 BRPM | HEART RATE: 62 BPM | OXYGEN SATURATION: 98 % | WEIGHT: 150.8 LBS | DIASTOLIC BLOOD PRESSURE: 60 MMHG | BODY MASS INDEX: 28.47 KG/M2 | HEIGHT: 61 IN | SYSTOLIC BLOOD PRESSURE: 122 MMHG | TEMPERATURE: 98.2 F

## 2025-06-10 DIAGNOSIS — Z08 ENCOUNTER FOR FOLLOW-UP SURVEILLANCE OF OVARIAN CANCER: Primary | ICD-10-CM

## 2025-06-10 DIAGNOSIS — Z85.43 HISTORY OF OVARIAN CANCER: ICD-10-CM

## 2025-06-10 DIAGNOSIS — Z85.43 ENCOUNTER FOR FOLLOW-UP SURVEILLANCE OF OVARIAN CANCER: Primary | ICD-10-CM

## 2025-06-10 PROCEDURE — 99213 OFFICE O/P EST LOW 20 MIN: CPT | Performed by: NURSE PRACTITIONER

## 2025-06-10 PROCEDURE — 99459 PELVIC EXAMINATION: CPT | Performed by: NURSE PRACTITIONER

## 2025-06-10 NOTE — PATIENT INSTRUCTIONS
1. Return to primary gynecologist for routine gynecologic care.  2. Call the office if you develop any new or concerning symptoms (new pain, bleeding, discharge, fevers, etc.)

## 2025-06-10 NOTE — PROGRESS NOTES
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 She elected to forgo adjuvant treatment. She is now 6 years from surgery and is feeling well. She is without any symptoms of recurrent disease. Her pelvic exam is normal. Her  was reviewed and is normal at 8 U/ml (done at [facility]). Her PS is 0.    Patient can be discharged from oncologic follow up at this time. She will establish with a primary gynecologist in [place], closer to her home.    She understands that we remain available for any new concerns.         History of ovarian cancer                 History of Present Illness   Reason for Visit / CC: Ovarian cancer surveillance       Ida Toussaint is a 55 y.o. female     Ida presents to the office for ovarian cancer surveillance. She has no new concerns since her last visit. She denies nausea or vomiting. Her appetite is appropriate. Normal bowel and bladder function. She denies abdominal or pelvic pain. She is without vaginal bleeding or discharge. She is ambulatory.          Pertinent Medical History        Oncology History   Cancer Staging   History of ovarian cancer  Staging form: Ovary, Fallopian Tube, Primary Peritoneal, AJCC 8th Edition  - Clinical stage from 2019: FIGO Stage I (cT1, cN0, cM0) - Signed by Jace Chirinos MD on 2019  Stage used in treatment planning: Yes  National guidelines used in treatment planning: Yes  - Pathologic stage from 2019: FIGO Stage IC1, calculated as Stage IC (pT1c1, pN0, cM0) - Signed by Jace Chirinos MD on 2019  Histologic grade (G): G3  Histologic grading system: 4 grade system  Residual tumor volume after primary  cytoreductive surgery: No gross tumor  Stage used in treatment planning: Yes  National guidelines used in treatment planning: Yes  Oncology History   History of ovarian cancer   5/17/2019 Initial Diagnosis    Malignant neoplasm of left ovary (HCC)     5/17/2019 Surgery    Patient underwent total abdominal hysterectomy bilateral salpingo oophorectomy radical omentectomy bilateral pelvic lymphadenectomy and staging procedure for 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.     5/28/2019 -  Cancer Staged    Staging form: Ovary, Fallopian Tube, Primary Peritoneal, AJCC 8th Edition  - Pathologic stage from 5/28/2019: FIGO Stage IC1, calculated as Stage IC (pT1c1, pN0, cM0) - Signed by Jace Chirinos MD on 5/28/2019  Histologic grade (G): G3  Histologic grading system: 4 grade system  Residual tumor volume after primary cytoreductive surgery: No gross tumor  Stage used in treatment planning: Yes  National guidelines used in treatment planning: Yes       5/28/2019 -  Cancer Staged    Staging form: Ovary, Fallopian Tube, Primary Peritoneal, AJCC 8th Edition  - Clinical stage from 5/28/2019: FIGO Stage I (cT1, cN0, cM0) - Signed by Jace Chirinos MD on 5/28/2019  Stage used in treatment planning: Yes  National guidelines used in treatment planning: Yes        Chemotherapy    Carbo platinum area under the curve of 6 paclitaxel 175 milligrams/meter squared for 3-6 cycles. Patient refused treatment        Review of Systems   Constitutional:  Negative for chills, fatigue, fever and unexpected weight change.   HENT:  Negative for nosebleeds.    Eyes: Negative.    Respiratory:  Negative for cough, chest tightness, shortness of breath and wheezing.    Cardiovascular:  Negative for chest pain, palpitations and leg swelling.   Gastrointestinal:  Negative for abdominal distention, abdominal pain, anal bleeding, blood in stool, constipation, diarrhea, nausea, rectal pain and vomiting.   Endocrine: Negative.   "  Genitourinary:  Negative for difficulty urinating, dysuria, frequency, hematuria, pelvic pain, urgency, vaginal bleeding, vaginal discharge and vaginal pain.   Musculoskeletal:  Negative for arthralgias and joint swelling.   Skin:  Negative for color change, pallor and rash.   Neurological:  Negative for dizziness, weakness, light-headedness, numbness and headaches.   Hematological: Negative.    Psychiatric/Behavioral: Negative.      A complete review of systems is negative other than that noted above in the HPI.  Medical History Reviewed by provider this encounter:     .  Past Medical History   Past Medical History[1]  Past Surgical History[2]  Family History[3]   reports that she has never smoked. She has never been exposed to tobacco smoke. She has never used smokeless tobacco. She reports that she does not currently use alcohol. She reports that she does not use drugs.  Current Outpatient Medications   Medication Instructions    albuterol (Ventolin HFA) 90 mcg/act inhaler 2 puffs, Inhalation, Every 4 hours PRN    ascorbic acid (VITAMIN C) 250 mg, Daily    Cod Liver Oil (COD LIVER PO) 1 Dose, 3 times weekly    MAGNESIUM BISGLYCINATE PO No dose, route, or frequency recorded.    multivitamin (THERAGRAN) TABS 1 tablet, Daily    Probiotic Product (PROBIOTIC PO) 1 capsule, Daily    TURMERIC PO 2 times daily   Allergies[4]   Medications Ordered Prior to Encounter[5]   Social History[6]     Objective   /60 (BP Location: Left arm, Patient Position: Sitting, Cuff Size: Adult)   Pulse 62   Temp 98.2 °F (36.8 °C)   Resp 17   Ht 5' 1\" (1.549 m)   Wt 68.4 kg (150 lb 12.8 oz)   LMP 05/10/2015   SpO2 98%   BMI 28.49 kg/m²     Body mass index is 28.49 kg/m².  Pain Screening:  Pain Score: 0-No pain  ECOG   0    Physical Exam  Vitals reviewed. Exam conducted with a chaperone present.   Constitutional:       General: She is not in acute distress.     Appearance: Normal appearance. She is not ill-appearing.   HENT: " "     Head: Normocephalic and atraumatic.      Mouth/Throat:      Mouth: Mucous membranes are moist.     Eyes:      General:         Right eye: No discharge.         Left eye: No discharge.      Conjunctiva/sclera: Conjunctivae normal.     Pulmonary:      Effort: Pulmonary effort is normal.   Abdominal:      Palpations: Abdomen is soft. There is no mass.      Tenderness: There is no abdominal tenderness.      Hernia: No hernia is present.   Genitourinary:     Comments: The external female genitalia is normal. The bartholin's, uretheral and skenes glands are normal. The urethral meatus is normal (midline with no lesions). Anus without fissure or lesion. Speculum exam reveals a grossly normal vagina. No masses, lesions,discharge or bleeding. No significant cystocele or rectocele noted. Bimanual exam notes a surgical absent cervix, uterus and adnexal structures. No masses or fullness. Bladder is without fullness, mass or tenderness.    Musculoskeletal:      Right lower leg: No edema.      Left lower leg: No edema.     Skin:     General: Skin is warm and dry.      Coloration: Skin is not jaundiced.      Findings: No rash.     Neurological:      General: No focal deficit present.      Mental Status: She is alert and oriented to person, place, and time.      Cranial Nerves: No cranial nerve deficit.      Sensory: No sensory deficit.      Motor: No weakness.      Gait: Gait normal.     Psychiatric:         Mood and Affect: Mood normal.         Behavior: Behavior normal.         Thought Content: Thought content normal.         Judgment: Judgment normal.          Labs: I have reviewed pertinent labs.   No results found for: \"\"  No results found for: \"NA\", \"K\", \"CL\", \"CO2\", \"ANIONGAP\", \"BUN\", \"CREATININE\", \"GLUCOSE\", \"GLUF\", \"CALCIUM\", \"CORRECTEDCA\", \"AST\", \"ALT\", \"ALKPHOS\", \"PROT\", \"BILITOT\", \"EGFR\"  No results found for: \"WBC\", \"HGB\", \"HCT\", \"MCV\", \"PLT\"  No results found for: \"NEUTROABS\"     Trend:  Lab Results "   Component Value Date     7.3 02/22/2021     6.3 11/30/2020     5.3 08/24/2020     5.0 05/21/2020     4.3 12/27/2019     4.9 09/16/2019     45.4 (H) 05/10/2019                    [1]   Past Medical History:  Diagnosis Date    Asthma     Cancer (HCC) 2019    Ovarian cancer (HCC) 2019    Ovarian cyst    [2]   Past Surgical History:  Procedure Laterality Date    COLONOSCOPY      HYSTERECTOMY  5/16/19    OVARIAN CYST SURGERY      MI LAPS REPAIR HERNIA EXCEPT INCAL/INGUN REDUCIBLE N/A 01/21/2020    Procedure: LAPAROSCOPIC VENTRAL HERNIA REPAIR WITH MESH;  Surgeon: Bradford Silverman MD;  Location:  MAIN OR;  Service: General    MI TOTAL ABDOMINAL HYSTERECT W/WO RMVL TUBE OVARY N/A 05/16/2019    Procedure: TOTAL ABDOMINAL HYSTERECTOMY, BILATERAL SALPINGO-OOPHORECTOMY, PELVIC LYMPHNODE DISSECTION, RADICAL OMENTECTOMY, STAGING BIOPSIES;  Surgeon: Jace Chirinos MD;  Location:  MAIN OR;  Service: Gynecology Oncology    TONSILLECTOMY     [3]   Family History  Problem Relation Name Age of Onset    Diabetes Mother Cindy Kennedy     Pancreatic cancer Mother Cindy Kennedy 58    Diabetes Father Tor Scheitrum     Cancer Father Tor Schemarci         Bladder cancer    No Known Problems Maternal Grandmother      No Known Problems Maternal Grandfather      No Known Problems Paternal Grandmother      Bone cancer Paternal Grandfather Jefry     Prostate cancer Paternal Grandfather Jefry     Brain cancer Maternal Aunt      No Known Problems Maternal Aunt reuben    [4]   Allergies  Allergen Reactions    Penicillins      Childhood reaction   [5]   Current Outpatient Medications on File Prior to Visit   Medication Sig Dispense Refill    albuterol (Ventolin HFA) 90 mcg/act inhaler Inhale 2 puffs every 4 (four) hours as needed for wheezing or shortness of breath 18 g 1    ascorbic acid (VITAMIN C) 250 mg tablet Take 250 mg by mouth in the morning. (Patient not taking: Reported on  6/10/2025)      Cod Liver Oil (COD LIVER PO) Take 1 Dose by mouth 3 (three) times a week (Patient not taking: Reported on 6/10/2025)      MAGNESIUM BISGLYCINATE PO  (Patient not taking: Reported on 6/10/2025)      multivitamin (THERAGRAN) TABS Take 1 tablet by mouth in the morning. (Patient not taking: Reported on 6/10/2025)      Probiotic Product (PROBIOTIC PO) Take 1 capsule by mouth in the morning. (Patient not taking: Reported on 6/10/2025)      TURMERIC PO Take by mouth in the morning and in the evening. (Patient not taking: Reported on 6/10/2025)      [DISCONTINUED] ondansetron (ZOFRAN) 8 mg tablet Take 1 tablet (8 mg total) by mouth every 8 (eight) hours as needed for nausea or vomiting 20 tablet 1     No current facility-administered medications on file prior to visit.   [6]   Social History  Tobacco Use    Smoking status: Never     Passive exposure: Never    Smokeless tobacco: Never   Vaping Use    Vaping status: Never Used   Substance and Sexual Activity    Alcohol use: Not Currently    Drug use: No    Sexual activity: Yes     Partners: Male     Birth control/protection: None     Comment:

## 2025-06-10 NOTE — PROGRESS NOTES
"Name: Ida Toussaint      : 1970      MRN: [id number]  Encounter Provider: XOCHILT Durand  Encounter Date: 6/10/2025   Encounter department: [facility] [place]  :  Assessment & Plan  Encounter for follow-up surveillance of ovarian cancer         History of ovarian cancer             {Oncology Survivorship (Optional):40002}    History of Present Illness {?Quick Links Encounters * My Last Note * Last Note in Specialty * Snapshot * Since Last Visit * History :93893}  Reason for Visit / CC: *** {Reason for Visit (Optional):94707::\"***\"}  Ida Toussaint is a 55 y.o. female   HPI  Pertinent Medical History   ***  {There is no content from the last Pertinent Medical History section.}     Oncology History   Cancer Staging   History of ovarian cancer  Staging form: Ovary, Fallopian Tube, Primary Peritoneal, AJCC 8th Edition  - Clinical stage from 2019: FIGO Stage I (cT1, cN0, cM0) - Signed by Jace Chirinos MD on 2019  Stage used in treatment planning: Yes  National guidelines used in treatment planning: Yes  - Pathologic stage from 2019: FIGO Stage IC1, calculated as Stage IC (pT1c1, pN0, cM0) - Signed by Jace Chirinos MD on 2019  Histologic grade (G): G3  Histologic grading system: 4 grade system  Residual tumor volume after primary cytoreductive surgery: No gross tumor  Stage used in treatment planning: Yes  National guidelines used in treatment planning: Yes  Oncology History   History of ovarian cancer   2019 Initial Diagnosis    Malignant neoplasm of left ovary (HCC)     2019 Surgery    Patient underwent total abdominal hysterectomy bilateral salpingo oophorectomy radical omentectomy bilateral pelvic lymphadenectomy and staging procedure for 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.     2019 -  Cancer Staged    Staging form: Ovary, Fallopian Tube, Primary Peritoneal, AJCC 8th Edition  - Pathologic stage from 2019: FIGO " "Stage IC1, calculated as Stage IC (pT1c1, pN0, cM0) - Signed by Jace Chirinos MD on 5/28/2019  Histologic grade (G): G3  Histologic grading system: 4 grade system  Residual tumor volume after primary cytoreductive surgery: No gross tumor  Stage used in treatment planning: Yes  National guidelines used in treatment planning: Yes       5/28/2019 -  Cancer Staged    Staging form: Ovary, Fallopian Tube, Primary Peritoneal, AJCC 8th Edition  - Clinical stage from 5/28/2019: FIGO Stage I (cT1, cN0, cM0) - Signed by Jace Chirinos MD on 5/28/2019  Stage used in treatment planning: Yes  National guidelines used in treatment planning: Yes        Chemotherapy    Carbo platinum area under the curve of 6 paclitaxel 175 milligrams/meter squared for 3-6 cycles. Patient refused treatment        Review of Systems A complete review of systems is negative other than that noted above in the HPI.  {Select to insert medical history sections (Optional):92412}     Objective {?Quick Links Trend Vitals * Enter New Vitals * Results Review * Timeline (Adult) * Labs * Imaging * Cardiology * Procedures * Lung Cancer Screening * Surgical eConsent :81459}  /60 (BP Location: Left arm, Patient Position: Sitting, Cuff Size: Adult)   Pulse 62   Temp 98.2 °F (36.8 °C)   Resp 17   Ht 5' 1\" (1.549 m)   Wt 68.4 kg (150 lb 12.8 oz)   LMP 05/10/2015   SpO2 98%   BMI 28.49 kg/m²     Body mass index is 28.49 kg/m².  Pain Screening:  Pain Score: 0-No pain  ECOG   ***  Physical Exam ***    Labs: I have reviewed pertinent labs. { AMB ONCOLOGY LABS (Optional):16433}  No results found for: \"\"  No results found for: \"NA\", \"K\", \"CL\", \"CO2\", \"ANIONGAP\", \"BUN\", \"CREATININE\", \"GLUCOSE\", \"GLUF\", \"CALCIUM\", \"CORRECTEDCA\", \"AST\", \"ALT\", \"ALKPHOS\", \"PROT\", \"BILITOT\", \"EGFR\"  No results found for: \"WBC\", \"HGB\", \"HCT\", \"MCV\", \"PLT\"  No results found for: \"NEUTROABS\"     Trend:  Lab Results   Component Value Date     7.3 02/22/2021    " " 6.3 11/30/2020     5.3 08/24/2020     5.0 05/21/2020     4.3 12/27/2019     4.9 09/16/2019     45.4 (H) 05/10/2019       {Radiology Results Review (Optional):98537}  {Other Study Results Review (Optional):60345::\"No additional pertinent studies reviewed.\"}    {Administrative / Billing Section (Optional):44679}  "

## 2025-06-10 NOTE — ASSESSMENT & PLAN NOTE
55-year-old with a history of stage IC1 clear cell ovarian cancer s/p ARABELLA/BSO/debulking in May 2019. She elected to forgo adjuvant treatment. She is now 6 years from surgery and is feeling well. She is without any symptoms of recurrent disease. Her pelvic exam is normal. Her  was reviewed and is normal at 8 U/ml (done at [facility]). Her PS is 0.    Patient can be discharged from oncologic follow up at this time. She will establish with a primary gynecologist in [place], closer to her home.    She understands that we remain available for any new concerns.

## 2025-06-30 NOTE — NURSING NOTE
Patient OOB with nurse assist to bathroom  Patient voided clear yellow urine  Patient tolerated well  Returned back to be for breakfast  Call bell and belongings within reach, will continue to monitor  [FreeTextEntry1] : Dysmenorrhea - Discussed medication options of NSAIDS/OCPs - pt declined OCPs - States that she take Ibuprofen 400mg once which is effective once taken - Discussed Naproxen q 12hr PRN, Rx sent - [employer] heating pads - Pt to try NSAIDs and if ineffective can make TEB appt to discuss OCPs

## (undated) DEVICE — PENCIL ELECTROSURG E-Z CLEAN -0035H

## (undated) DEVICE — 3M™ TEGADERM™ TRANSPARENT FILM DRESSING FRAME STYLE, 1624W, 2-3/8 IN X 2-3/4 IN (6 CM X 7 CM), 100/CT 4CT/CASE: Brand: 3M™ TEGADERM™

## (undated) DEVICE — SUT PLAIN 2-0 CTX 27 IN 872H

## (undated) DEVICE — ADHESIVE SKN CLSR HISTOACRYL FLEX 0.5ML LF

## (undated) DEVICE — VESSEL LOOPS X-RAY DETECTABLE: Brand: DEROYAL

## (undated) DEVICE — PREMIUM DRY TRAY LF: Brand: MEDLINE INDUSTRIES, INC.

## (undated) DEVICE — SUT VICRYL 0 REEL 54 IN J287G

## (undated) DEVICE — PACK PBDS STERILE LAP LITHOTOMY RF

## (undated) DEVICE — LIGACLIP MCA MULTIPLE CLIP APPLIERS, 20 MEDIUM CLIPS: Brand: LIGACLIP

## (undated) DEVICE — GARMENT,MEDLINE,DVT,INT,CALF,FOAM,MED: Brand: MEDLINE

## (undated) DEVICE — CHLORAPREP HI-LITE 26ML ORANGE

## (undated) DEVICE — SUT STRATAFIX SPIRAL 3-0 PGA/PCL 30 X 30 CM SXMD2B408

## (undated) DEVICE — CLOSURE DEVICE ENDO CLOSE

## (undated) DEVICE — BINDER ABDOMINAL 9 X 45IN 3 PANEL UNIVERSAL

## (undated) DEVICE — CHLORHEXIDINE 4PCT 4 OZ

## (undated) DEVICE — NEEDLE SPINAL 22G X 1 1/2

## (undated) DEVICE — GLOVE PI ULTRA TOUCH SZ.7.5

## (undated) DEVICE — GRASPER ATRAUMATIC FEN 5MM X 33CM ROTATING THUMB LOOP GENI-SURGE

## (undated) DEVICE — ENDOPATH XCEL BLADELESS TROCARS WITH STABILITY SLEEVES: Brand: ENDOPATH XCEL

## (undated) DEVICE — SUT VICRYL 0 UR-6 27 IN J603H

## (undated) DEVICE — SUT VICRYL 3-0 SH 27 IN J416H

## (undated) DEVICE — GLOVE SRG BIOGEL 7

## (undated) DEVICE — ELECTRODE BLADE MOD  E-Z CLEAN 6.5IN -0014M

## (undated) DEVICE — 5 MM CURVED DISSECTORS WITH MONOPOLAR CAUTERY: Brand: ENDOPATH

## (undated) DEVICE — Device

## (undated) DEVICE — SUT MONOCRYL 4-0 PS-2 27 IN Y426H

## (undated) DEVICE — NEEDLE 25G X 1 1/2

## (undated) DEVICE — ENSEAL 20 CM SHAFT, LARGE JAW: Brand: ENSEAL X1

## (undated) DEVICE — [HIGH FLOW INSUFFLATOR,  DO NOT USE IF PACKAGE IS DAMAGED,  KEEP DRY,  KEEP AWAY FROM SUNLIGHT,  PROTECT FROM HEAT AND RADIOACTIVE SOURCES.]: Brand: PNEUMOSURE

## (undated) DEVICE — GLOVE INDICATOR PI UNDERGLOVE SZ 7.5 BLUE

## (undated) DEVICE — INTENDED FOR TISSUE SEPARATION, AND OTHER PROCEDURES THAT REQUIRE A SHARP SURGICAL BLADE TO PUNCTURE OR CUT.: Brand: BARD-PARKER SAFETY BLADES SIZE 15, STERILE

## (undated) DEVICE — 3000CC GUARDIAN II: Brand: GUARDIAN

## (undated) DEVICE — LAPAROSCOPIC SCISSORS: Brand: EPIX LAPAROSCOPIC SCISSORS

## (undated) DEVICE — SUT PDS II 1 XLH 96 IN LOOPED Z881G

## (undated) DEVICE — HARMONIC 1100 SHEARS, 36CM SHAFT LENGTH: Brand: HARMONIC

## (undated) DEVICE — TROCAR: Brand: KII FIOS FIRST ENTRY

## (undated) DEVICE — TRAY FOLEY 16FR URIMETER SILICONE SURESTEP

## (undated) DEVICE — LAPAROSCOPY PACK: Brand: MEDLINE INDUSTRIES, INC.

## (undated) DEVICE — INTENDED FOR TISSUE SEPARATION, AND OTHER PROCEDURES THAT REQUIRE A SHARP SURGICAL BLADE TO PUNCTURE OR CUT.: Brand: BARD-PARKER ® CARBON RIB-BACK BLADES

## (undated) DEVICE — GAUZE SPONGES,8 PLY: Brand: CURITY

## (undated) DEVICE — GLOVE INDICATOR UNDERGLOVE SZ 7.5 GREEN

## (undated) DEVICE — SUT VICRYL 0 CT-1 27 IN J260H

## (undated) DEVICE — MEDI-VAC YANK SUCT HNDL W/TPRD BULBOUS TIP: Brand: CARDINAL HEALTH

## (undated) DEVICE — TRANSPOSAL ULTRAFLEX DUO/QUAD ULTRA CART MANIFOLD